# Patient Record
Sex: MALE | Race: WHITE | NOT HISPANIC OR LATINO | Employment: OTHER | URBAN - METROPOLITAN AREA
[De-identification: names, ages, dates, MRNs, and addresses within clinical notes are randomized per-mention and may not be internally consistent; named-entity substitution may affect disease eponyms.]

---

## 2020-12-18 ENCOUNTER — HOSPITAL ENCOUNTER (EMERGENCY)
Facility: HOSPITAL | Age: 75
Discharge: HOME | End: 2020-12-18
Attending: EMERGENCY MEDICINE
Payer: MEDICARE

## 2020-12-18 VITALS
RESPIRATION RATE: 19 BRPM | BODY MASS INDEX: 40.09 KG/M2 | WEIGHT: 280 LBS | HEART RATE: 75 BPM | TEMPERATURE: 98 F | HEIGHT: 70 IN | SYSTOLIC BLOOD PRESSURE: 105 MMHG | OXYGEN SATURATION: 96 % | DIASTOLIC BLOOD PRESSURE: 60 MMHG

## 2020-12-18 DIAGNOSIS — R55 SYNCOPE: ICD-10-CM

## 2020-12-18 DIAGNOSIS — F10.920 ALCOHOLIC INTOXICATION WITHOUT COMPLICATION: ICD-10-CM
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
## 2020-12-18 DIAGNOSIS — S09.90XA TRAUMATIC INJURY OF HEAD, INITIAL ENCOUNTER: ICD-10-CM

## 2020-12-18 LAB
ALBUMIN SERPL BCP-MCNC: 4.1 G/DL (ref 3.5–5.2)
ALP SERPL-CCNC: 40 U/L (ref 55–135)
ALT SERPL W/O P-5'-P-CCNC: 30 U/L (ref 10–44)
ANION GAP SERPL CALC-SCNC: 8 MMOL/L (ref 8–16)
AST SERPL-CCNC: 24 U/L (ref 10–40)
BASOPHILS # BLD AUTO: 0.02 K/UL (ref 0–0.2)
BASOPHILS NFR BLD: 0.4 % (ref 0–1.9)
BILIRUB SERPL-MCNC: 0.6 MG/DL (ref 0.1–1)
BUN SERPL-MCNC: 14 MG/DL (ref 8–23)
CALCIUM SERPL-MCNC: 8.8 MG/DL (ref 8.7–10.5)
CHLORIDE SERPL-SCNC: 103 MMOL/L (ref 95–110)
CO2 SERPL-SCNC: 27 MMOL/L (ref 23–29)
CREAT SERPL-MCNC: 0.9 MG/DL (ref 0.5–1.4)
DIFFERENTIAL METHOD: ABNORMAL
EOSINOPHIL # BLD AUTO: 0.1 K/UL (ref 0–0.5)
EOSINOPHIL NFR BLD: 0.9 % (ref 0–8)
ERYTHROCYTE [DISTWIDTH] IN BLOOD BY AUTOMATED COUNT: 12.8 % (ref 11.5–14.5)
EST. GFR  (AFRICAN AMERICAN): >60 ML/MIN/1.73 M^2
EST. GFR  (NON AFRICAN AMERICAN): >60 ML/MIN/1.73 M^2
ETHANOL SERPL-MCNC: 227 MG/DL
GLUCOSE SERPL-MCNC: 114 MG/DL (ref 70–110)
HCT VFR BLD AUTO: 47.7 % (ref 40–54)
HGB BLD-MCNC: 15.7 G/DL (ref 14–18)
IMM GRANULOCYTES # BLD AUTO: 0.01 K/UL (ref 0–0.04)
IMM GRANULOCYTES NFR BLD AUTO: 0.2 % (ref 0–0.5)
LYMPHOCYTES # BLD AUTO: 1.3 K/UL (ref 1–4.8)
LYMPHOCYTES NFR BLD: 23.4 % (ref 18–48)
MCH RBC QN AUTO: 32 PG (ref 27–31)
MCHC RBC AUTO-ENTMCNC: 32.9 G/DL (ref 32–36)
MCV RBC AUTO: 97 FL (ref 82–98)
MONOCYTES # BLD AUTO: 0.5 K/UL (ref 0.3–1)
MONOCYTES NFR BLD: 9.4 % (ref 4–15)
NEUTROPHILS # BLD AUTO: 3.7 K/UL (ref 1.8–7.7)
NEUTROPHILS NFR BLD: 65.7 % (ref 38–73)
NRBC BLD-RTO: 0 /100 WBC
PLATELET # BLD AUTO: 196 K/UL (ref 150–350)
PMV BLD AUTO: 8.5 FL (ref 9.2–12.9)
POTASSIUM SERPL-SCNC: 4.2 MMOL/L (ref 3.5–5.1)
PROT SERPL-MCNC: 8.5 G/DL (ref 6–8.4)
RBC # BLD AUTO: 4.9 M/UL (ref 4.6–6.2)
SODIUM SERPL-SCNC: 138 MMOL/L (ref 136–145)
TROPONIN I SERPL DL<=0.01 NG/ML-MCNC: <0.01 NG/ML (ref 0.02–0.5)
WBC # BLD AUTO: 5.64 K/UL (ref 3.9–12.7)

## 2020-12-18 PROCEDURE — 63600175 PHARM REV CODE 636 W HCPCS: Performed by: EMERGENCY MEDICINE

## 2020-12-18 PROCEDURE — 85025 COMPLETE CBC W/AUTO DIFF WBC: CPT

## 2020-12-18 PROCEDURE — 70450 CT HEAD/BRAIN W/O DYE: CPT | Mod: TC

## 2020-12-18 PROCEDURE — 84484 ASSAY OF TROPONIN QUANT: CPT

## 2020-12-18 PROCEDURE — 80053 COMPREHEN METABOLIC PANEL: CPT

## 2020-12-18 PROCEDURE — 70450 CT HEAD WITHOUT CONTRAST: ICD-10-PCS | Mod: 26,,, | Performed by: RADIOLOGY

## 2020-12-18 PROCEDURE — 80320 DRUG SCREEN QUANTALCOHOLS: CPT

## 2020-12-18 PROCEDURE — 90471 IMMUNIZATION ADMIN: CPT | Performed by: EMERGENCY MEDICINE

## 2020-12-18 PROCEDURE — 36415 COLL VENOUS BLD VENIPUNCTURE: CPT

## 2020-12-18 PROCEDURE — 90714 TD VACC NO PRESV 7 YRS+ IM: CPT | Performed by: EMERGENCY MEDICINE

## 2020-12-18 PROCEDURE — 96374 THER/PROPH/DIAG INJ IV PUSH: CPT

## 2020-12-18 PROCEDURE — 70450 CT HEAD/BRAIN W/O DYE: CPT | Mod: 26,,, | Performed by: RADIOLOGY

## 2020-12-18 PROCEDURE — 93005 ELECTROCARDIOGRAM TRACING: CPT

## 2020-12-18 PROCEDURE — 99285 EMERGENCY DEPT VISIT HI MDM: CPT | Mod: 25

## 2020-12-18 RX ORDER — ONDANSETRON 2 MG/ML
4 INJECTION INTRAMUSCULAR; INTRAVENOUS
Status: COMPLETED | OUTPATIENT
Start: 2020-12-18 | End: 2020-12-18

## 2020-12-18 RX ADMIN — ONDANSETRON 4 MG: 2 INJECTION INTRAMUSCULAR; INTRAVENOUS at 05:12

## 2020-12-18 RX ADMIN — TETANUS AND DIPHTHERIA TOXOIDS ADSORBED 0.5 ML: 2; 2 INJECTION INTRAMUSCULAR at 05:12

## 2020-12-18 NOTE — ED PROVIDER NOTES
Encounter Date: 12/18/2020       History     Chief Complaint   Patient presents with    Fall     Possible syncope; ETOH     75-year-old male [place] a local bar via ambulance after he had a syncopal episode.  Patient states he stood up to leave for home, and the next thing he remembers he was on the ground with people standing around him.  He states he felt too weak to get up.  He denies any pain.  No headache, no back pain, no neck pain.  He is complaining of some nausea.  Denies chest pain or palpitations.  Denies any known history of syncope.  Admits to drinking alcohol prior to the event.        Review of patient's allergies indicates:  No Known Allergies  History reviewed. No pertinent past medical history.  History reviewed. No pertinent surgical history.  History reviewed. No pertinent family history.  Social History     Tobacco Use    Smoking status: Former Smoker    Smokeless tobacco: Former User   Substance Use Topics    Alcohol use: Yes    Drug use: Never     Review of Systems   Constitutional: Negative for chills and fever.   HENT: Negative for congestion, rhinorrhea and sore throat.    Eyes: Negative for photophobia and visual disturbance.   Respiratory: Negative for cough and shortness of breath.    Cardiovascular: Negative for chest pain and palpitations.   Gastrointestinal: Positive for nausea. Negative for abdominal pain and vomiting.   Endocrine: Negative for polyphagia and polyuria.   Genitourinary: Negative for difficulty urinating, dysuria, flank pain and hematuria.   Musculoskeletal: Negative for back pain, myalgias, neck pain and neck stiffness.   Skin: Positive for wound. Negative for pallor and rash.   Neurological: Positive for syncope. Negative for dizziness, seizures, facial asymmetry, speech difficulty, weakness, light-headedness, numbness and headaches.   Psychiatric/Behavioral: Negative for confusion and decreased concentration. The patient is not nervous/anxious.         Physical Exam     Initial Vitals   BP Pulse Resp Temp SpO2   12/18/20 1718 12/18/20 1718 12/18/20 1718 12/18/20 1715 12/18/20 1718   137/74 70 20 97.5 °F (36.4 °C) 96 %      MAP       --                Physical Exam    Nursing note and vitals reviewed.  Constitutional: He appears well-developed and well-nourished. No distress.   HENT:   Head: Normocephalic.   Right Ear: External ear normal.   Left Ear: External ear normal.   Nose: Nose normal.   Mouth/Throat: Oropharynx is clear and moist. No oropharyngeal exudate.   Mild abrasion on the left side of the nose, and in the left temporal area.  No obvious lacerations.  Bleeding well controlled.   Eyes: Conjunctivae and EOM are normal. Pupils are equal, round, and reactive to light. No scleral icterus.   Neck: Normal range of motion. Neck supple. No tracheal deviation present. No JVD present.   Cardiovascular: Normal rate, regular rhythm, normal heart sounds and intact distal pulses.   No murmur heard.  Pulmonary/Chest: Breath sounds normal. No stridor. No respiratory distress. He has no wheezes.   Abdominal: Soft. Bowel sounds are normal. He exhibits no distension. There is no abdominal tenderness.   Musculoskeletal: Normal range of motion. No tenderness or edema.   Neurological: He is alert and oriented to person, place, and time. He has normal strength and normal reflexes. He displays normal reflexes. No cranial nerve deficit. GCS score is 15. GCS eye subscore is 4. GCS verbal subscore is 5. GCS motor subscore is 6.   Skin: Skin is warm. Capillary refill takes less than 2 seconds. No rash noted. No erythema.   Psychiatric: He has a normal mood and affect. His behavior is normal.         ED Course   Procedures  Labs Reviewed   CBC W/ AUTO DIFFERENTIAL - Abnormal; Notable for the following components:       Result Value    MCH 32.0 (*)     MPV 8.5 (*)     All other components within normal limits   COMPREHENSIVE METABOLIC PANEL - Abnormal; Notable for the  following components:    Glucose 114 (*)     Total Protein 8.5 (*)     Alkaline Phosphatase 40 (*)     All other components within normal limits   ALCOHOL,MEDICAL (ETHANOL) - Abnormal; Notable for the following components:    Alcohol, Serum 227 (*)     All other components within normal limits   TROPONIN I - Abnormal; Notable for the following components:    Troponin I <0.01 (*)     All other components within normal limits     EKG Readings: (Independently Interpreted)   Initial Reading: No STEMI. Rhythm: Normal Sinus Rhythm. Heart Rate: 71. Ectopy: No Ectopy. Conduction: Normal. ST Segments: Normal ST Segments. T Waves: Normal.   EKG, personally reviewed by me, shows normal sinus rhythm with junctional ST depression, likely normal.  71 beats per minute, OR interval 166, .  No obvious ST elevations or depressions.       Imaging Results          CT Head Without Contrast (In process)                  Medical Decision Making:   Differential Diagnosis:   Alcohol intoxication, hypotension, hypoglycemia, stroke, skull fracture, cardiac arrhythmia, etc.  ED Management:  Patient's labs and CT are pending.  He states he is comfortable.  He was given Zofran for nausea.  At shift change, his care will be transferred to Dr. AGUILAR who will make final disposition.                             Clinical Impression:     ICD-10-CM ICD-9-CM   1. Fall, initial encounter  W19.XXXA E888.9   2. Syncope  R55 780.2   3. Alcoholic intoxication without complication  F10.920 305.00   4. Traumatic injury of head, initial encounter  S09.90XA 959.01                          ED Disposition Condition    Discharge Stable        ED Prescriptions     None        Follow-up Information    None                                      Antione Aguilar MD  12/19/20 0543       Antione Aguilar MD  12/19/20 0609       Antione Aguilar MD  12/19/20 0610

## 2020-12-19 NOTE — ED NOTES
"Pt report received at bedside from QASIM Meza, assumed care of pt. Pt lying in bed AAOX3, states, "I am just ready to go home.", made pt aware of awaiting results, all safety measures noted and maintained, will continue to monitor pt. Dr. Aguilar at bedside at this time.   "

## 2020-12-19 NOTE — ED NOTES
PT arrived via EMS after fall. Pt having some drinks with buddies and had a fall while walking to his car; unclear whether there was a syncopal episode or loss of balance.

## 2024-03-06 ENCOUNTER — HOSPITAL ENCOUNTER (OUTPATIENT)
Facility: HOSPITAL | Age: 79
Discharge: HOME | End: 2024-03-06
Attending: EMERGENCY MEDICINE | Admitting: INTERNAL MEDICINE
Payer: MEDICARE

## 2024-03-06 VITALS
DIASTOLIC BLOOD PRESSURE: 96 MMHG | HEIGHT: 70 IN | HEART RATE: 125 BPM | WEIGHT: 287.06 LBS | RESPIRATION RATE: 23 BRPM | OXYGEN SATURATION: 92 % | BODY MASS INDEX: 41.1 KG/M2 | TEMPERATURE: 98 F | SYSTOLIC BLOOD PRESSURE: 138 MMHG

## 2024-03-06 DIAGNOSIS — R06.02 SHORTNESS OF BREATH: ICD-10-CM

## 2024-03-06 DIAGNOSIS — R00.0 TACHYCARDIA: ICD-10-CM

## 2024-03-06 DIAGNOSIS — I50.33 ACUTE ON CHRONIC DIASTOLIC (CONGESTIVE) HEART FAILURE: Primary | ICD-10-CM

## 2024-03-06 LAB
ALBUMIN SERPL BCP-MCNC: 3.3 G/DL (ref 3.5–5.2)
ALP SERPL-CCNC: 66 U/L (ref 55–135)
ALT SERPL W/O P-5'-P-CCNC: 18 U/L (ref 10–44)
ANION GAP SERPL CALC-SCNC: 8 MMOL/L (ref 8–16)
AST SERPL-CCNC: 23 U/L (ref 10–40)
BASOPHILS # BLD AUTO: 0.04 K/UL (ref 0–0.2)
BASOPHILS NFR BLD: 0.9 % (ref 0–1.9)
BILIRUB SERPL-MCNC: 0.9 MG/DL (ref 0.1–1)
BNP SERPL-MCNC: 369 PG/ML (ref 0–99)
BUN SERPL-MCNC: 13 MG/DL (ref 8–23)
CALCIUM SERPL-MCNC: 9.2 MG/DL (ref 8.7–10.5)
CHLORIDE SERPL-SCNC: 104 MMOL/L (ref 95–110)
CO2 SERPL-SCNC: 25 MMOL/L (ref 23–29)
CREAT SERPL-MCNC: 0.9 MG/DL (ref 0.5–1.4)
DIFFERENTIAL METHOD BLD: ABNORMAL
EOSINOPHIL # BLD AUTO: 0 K/UL (ref 0–0.5)
EOSINOPHIL NFR BLD: 0.9 % (ref 0–8)
ERYTHROCYTE [DISTWIDTH] IN BLOOD BY AUTOMATED COUNT: 13.1 % (ref 11.5–14.5)
EST. GFR  (NO RACE VARIABLE): >60 ML/MIN/1.73 M^2
GLUCOSE SERPL-MCNC: 101 MG/DL (ref 70–110)
HCT VFR BLD AUTO: 46 % (ref 40–54)
HCV AB SERPL QL IA: NEGATIVE
HEP C VIRUS HOLD SPECIMEN: NORMAL
HGB BLD-MCNC: 15.2 G/DL (ref 14–18)
HIV 1+2 AB+HIV1 P24 AG SERPL QL IA: NEGATIVE
IMM GRANULOCYTES # BLD AUTO: 0.01 K/UL (ref 0–0.04)
IMM GRANULOCYTES NFR BLD AUTO: 0.2 % (ref 0–0.5)
INFLUENZA A, MOLECULAR: NEGATIVE
INFLUENZA B, MOLECULAR: NEGATIVE
LYMPHOCYTES # BLD AUTO: 1 K/UL (ref 1–4.8)
LYMPHOCYTES NFR BLD: 22.6 % (ref 18–48)
MCH RBC QN AUTO: 32.3 PG (ref 27–31)
MCHC RBC AUTO-ENTMCNC: 33 G/DL (ref 32–36)
MCV RBC AUTO: 98 FL (ref 82–98)
MONOCYTES # BLD AUTO: 0.5 K/UL (ref 0.3–1)
MONOCYTES NFR BLD: 11.1 % (ref 4–15)
NEUTROPHILS # BLD AUTO: 2.8 K/UL (ref 1.8–7.7)
NEUTROPHILS NFR BLD: 64.3 % (ref 38–73)
NRBC BLD-RTO: 0 /100 WBC
PLATELET # BLD AUTO: 237 K/UL (ref 150–450)
PMV BLD AUTO: 9.9 FL (ref 9.2–12.9)
POTASSIUM SERPL-SCNC: 4.5 MMOL/L (ref 3.5–5.1)
PROT SERPL-MCNC: 8 G/DL (ref 6–8.4)
RBC # BLD AUTO: 4.71 M/UL (ref 4.6–6.2)
SARS-COV-2 RDRP RESP QL NAA+PROBE: NEGATIVE
SODIUM SERPL-SCNC: 137 MMOL/L (ref 136–145)
SPECIMEN SOURCE: NORMAL
TROPONIN I SERPL DL<=0.01 NG/ML-MCNC: 0.09 NG/ML (ref 0–0.03)
WBC # BLD AUTO: 4.42 K/UL (ref 3.9–12.7)

## 2024-03-06 PROCEDURE — 80053 COMPREHEN METABOLIC PANEL: CPT | Performed by: NURSE PRACTITIONER

## 2024-03-06 PROCEDURE — 87502 INFLUENZA DNA AMP PROBE: CPT | Performed by: NURSE PRACTITIONER

## 2024-03-06 PROCEDURE — U0002 COVID-19 LAB TEST NON-CDC: HCPCS | Performed by: NURSE PRACTITIONER

## 2024-03-06 PROCEDURE — 99285 EMERGENCY DEPT VISIT HI MDM: CPT | Mod: 25

## 2024-03-06 PROCEDURE — 96374 THER/PROPH/DIAG INJ IV PUSH: CPT

## 2024-03-06 PROCEDURE — 87389 HIV-1 AG W/HIV-1&-2 AB AG IA: CPT | Performed by: EMERGENCY MEDICINE

## 2024-03-06 PROCEDURE — 83880 ASSAY OF NATRIURETIC PEPTIDE: CPT | Performed by: NURSE PRACTITIONER

## 2024-03-06 PROCEDURE — 25500020 PHARM REV CODE 255: Performed by: EMERGENCY MEDICINE

## 2024-03-06 PROCEDURE — 86803 HEPATITIS C AB TEST: CPT | Performed by: EMERGENCY MEDICINE

## 2024-03-06 PROCEDURE — 84484 ASSAY OF TROPONIN QUANT: CPT | Performed by: NURSE PRACTITIONER

## 2024-03-06 PROCEDURE — 63600175 PHARM REV CODE 636 W HCPCS: Performed by: EMERGENCY MEDICINE

## 2024-03-06 PROCEDURE — 85025 COMPLETE CBC W/AUTO DIFF WBC: CPT | Performed by: NURSE PRACTITIONER

## 2024-03-06 PROCEDURE — G0378 HOSPITAL OBSERVATION PER HR: HCPCS

## 2024-03-06 RX ORDER — FUROSEMIDE 40 MG/1
40 TABLET ORAL DAILY
Qty: 30 TABLET | Refills: 0 | Status: SHIPPED | OUTPATIENT
Start: 2024-03-06 | End: 2024-04-01

## 2024-03-06 RX ORDER — FUROSEMIDE 10 MG/ML
60 INJECTION INTRAMUSCULAR; INTRAVENOUS
Status: COMPLETED | OUTPATIENT
Start: 2024-03-06 | End: 2024-03-06

## 2024-03-06 RX ADMIN — IOHEXOL 100 ML: 350 INJECTION, SOLUTION INTRAVENOUS at 12:03

## 2024-03-06 RX ADMIN — FUROSEMIDE 60 MG: 10 INJECTION, SOLUTION INTRAMUSCULAR; INTRAVENOUS at 11:03

## 2024-03-06 NOTE — PHARMACY MED REC
"Admission Medication History     The home medication history was taken by Nita Cabrera.    You may go to "Admission" then "Reconcile Home Medications" tabs to review and/or act upon these items.     The home medication list has been updated by the Pharmacy department.   Please read ALL comments highlighted in yellow.   Please address this information as you see fit.    Feel free to contact us if you have any questions or require assistance.        Medications listed below were obtained from: Patient/family and Analytic software- gracia Shen at bedside Justina Clark  (Not in a hospital admission)      LAST MED REC COMPLETED:         Nita Cabrera  [id number]      No current outpatient medications on file prior to encounter.         e                  .          "

## 2024-03-06 NOTE — FIRST PROVIDER EVALUATION
"Medical screening examination initiated.  I have conducted a focused provider triage encounter, findings are as follows:    Brief history of present illness:  Patient presents the ER for shortness of breath.  Patient is tachycardic    Vitals:    03/06/24 0924   BP: (!) 168/110   BP Location: Right arm   Patient Position: Sitting   Pulse: (!) 127   Resp: (!) 24   Temp: 97.5 °F (36.4 °C)   TempSrc: Oral   SpO2: 96%   Weight: 130.2 kg (287 lb 0.6 oz)   Height: 5' 10" (1.778 m)       Pertinent physical exam:  Tachycardic    Brief workup plan:  Cardiac workup    Preliminary workup initiated; this workup will be continued and followed by the physician or advanced practice provider that is assigned to the patient when roomed.  "

## 2024-03-06 NOTE — ED PROVIDER NOTES
SCRIBE #1 NOTE: I, Gerardo Cruz, am scribing for, and in the presence of, Didier Tinoco MD. I have scribed the entire note.      History      Chief Complaint   Patient presents with    Shortness of Breath     Patient presents to ED with c/o SOB that started last night, has history of lymphedema, BLE edematous.       Review of patient's allergies indicates:   Allergen Reactions    Vancomycin Hives     Patient given vancomycin on 7/7/2020 developed hives.  Vancomycin added to allergy list.        HPI   HPI    3/6/2024, 9:42 AM   History obtained from the patient      History of Present Illness: Chucho Clark is a 78 y.o. male patient who presents to the Emergency Department for SOB, onset last night. Symptoms are constant and moderate in severity. Sxs are worse when laying flat. Associated sxs include chest pain and BLE swelling. Patient denies any fever, chills, n/v/d, weakness, numbness, dizziness, headache, and all other sxs at this time. Pt denies any known PMHx of CHF. No further complaints or concerns at this time.     Arrival mode: Personal vehicle    PCP: No, Primary Doctor       Past Medical History:  No past medical history on file.    Past Surgical History:  No past surgical history on file.      Family History:  No family history on file.    Social History:  Social History     Tobacco Use    Smoking status: Former    Smokeless tobacco: Former   Substance and Sexual Activity    Alcohol use: Yes    Drug use: Never    Sexual activity: Not on file       ROS   Review of Systems   Constitutional:  Negative for chills and fever.   HENT:  Negative for sore throat.    Respiratory:  Positive for shortness of breath.    Cardiovascular:  Positive for chest pain and leg swelling (BLE).   Gastrointestinal:  Negative for diarrhea, nausea and vomiting.   Genitourinary:  Negative for dysuria.   Musculoskeletal:  Negative for back pain.   Skin:  Negative for rash.   Neurological:  Negative for dizziness, weakness,  "numbness and headaches.   Hematological:  Does not bruise/bleed easily.   All other systems reviewed and are negative.    Physical Exam      Initial Vitals [03/06/24 0924]   BP Pulse Resp Temp SpO2   (!) 168/110 (!) 127 (!) 24 97.5 °F (36.4 °C) 96 %      MAP       --          Physical Exam  Nursing Notes and Vital Signs Reviewed.  Constitutional: Patient is in no acute distress. Well-developed and well-nourished.  Head: Atraumatic. Normocephalic.  Eyes: PERRL. EOM intact. Conjunctivae are not pale. No scleral icterus.  ENT: Mucous membranes are moist. Oropharynx is clear and symmetric.    Neck: Supple. Full ROM.   Cardiovascular: Tachycardic. Regular rhythm. No murmurs, rubs, or gallops. Distal pulses are 2+ and symmetric.  Pulmonary/Chest: No respiratory distress. Clear to auscultation bilaterally. No wheezing or rales.  Abdominal: Soft and non-distended.  There is no tenderness.  No rebound, guarding, or rigidity.   Musculoskeletal: Moves all extremities. No obvious deformities. 3+ BLE edema.  Skin: Warm and dry.  Neurological:  Alert, awake, and appropriate.  Normal speech.  No acute focal neurological deficits are appreciated.  Psychiatric: Normal affect. Good eye contact. Appropriate in content.    ED Course    Procedures  ED Vital Signs:  Vitals:    03/06/24 0924 03/06/24 0945 03/06/24 1000 03/06/24 1030   BP: (!) 168/110 (!) 180/113 (!) 163/98 (!) 153/103   Pulse: (!) 127 (!) 127 (!) 127 (!) 125   Resp: (!) 24 (!) 29 (!) 27 (!) 30   Temp: 97.5 °F (36.4 °C)      TempSrc: Oral      SpO2: 96% (!) 93% (!) 94% (!) 94%   Weight: 130.2 kg (287 lb 0.6 oz)      Height: 5' 10" (1.778 m)       03/06/24 1100 03/06/24 1115 03/06/24 1200   BP: (!) 161/106 (!) 152/99 (!) 153/99   Pulse: (!) 127 (!) 128 (!) 128   Resp: (!) 24 (!) 26 (!) 24   Temp:      TempSrc:      SpO2: (!) 93% (!) 92% (!) 94%   Weight:      Height:          Abnormal Lab Results:  Labs Reviewed   CBC W/ AUTO DIFFERENTIAL - Abnormal; Notable for the " following components:       Result Value    MCH 32.3 (*)     All other components within normal limits    Narrative:     Release to patient->Immediate   COMPREHENSIVE METABOLIC PANEL - Abnormal; Notable for the following components:    Albumin 3.3 (*)     All other components within normal limits    Narrative:     Release to patient->Immediate   TROPONIN I - Abnormal; Notable for the following components:    Troponin I 0.093 (*)     All other components within normal limits    Narrative:     Release to patient->Immediate   B-TYPE NATRIURETIC PEPTIDE - Abnormal; Notable for the following components:     (*)     All other components within normal limits    Narrative:     Release to patient->Immediate   INFLUENZA A & B BY MOLECULAR   HIV 1 / 2 ANTIBODY    Narrative:     Release to patient->Immediate   HEPATITIS C ANTIBODY    Narrative:     Release to patient->Immediate   HEP C VIRUS HOLD SPECIMEN    Narrative:     Release to patient->Immediate   SARS-COV-2 RNA AMPLIFICATION, QUAL        All Lab Results:  Results for orders placed or performed during the hospital encounter of 03/06/24   Influenza A & B by Molecular    Specimen: Nasopharyngeal Swab   Result Value Ref Range    Influenza A, Molecular Negative Negative    Influenza B, Molecular Negative Negative    Flu A & B Source Nasal swab    HIV 1/2 Ag/Ab (4th Gen)   Result Value Ref Range    HIV 1/2 Ag/Ab Negative Negative   Hepatitis C Antibody   Result Value Ref Range    Hepatitis C Ab Negative Negative   HCV Virus Hold Specimen   Result Value Ref Range    HEP C Virus Hold Specimen Hold for HCV sendout    CBC Auto Differential   Result Value Ref Range    WBC 4.42 3.90 - 12.70 K/uL    RBC 4.71 4.60 - 6.20 M/uL    Hemoglobin 15.2 14.0 - 18.0 g/dL    Hematocrit 46.0 40.0 - 54.0 %    MCV 98 82 - 98 fL    MCH 32.3 (H) 27.0 - 31.0 pg    MCHC 33.0 32.0 - 36.0 g/dL    RDW 13.1 11.5 - 14.5 %    Platelets 237 150 - 450 K/uL    MPV 9.9 9.2 - 12.9 fL    Immature  Granulocytes 0.2 0.0 - 0.5 %    Gran # (ANC) 2.8 1.8 - 7.7 K/uL    Immature Grans (Abs) 0.01 0.00 - 0.04 K/uL    Lymph # 1.0 1.0 - 4.8 K/uL    Mono # 0.5 0.3 - 1.0 K/uL    Eos # 0.0 0.0 - 0.5 K/uL    Baso # 0.04 0.00 - 0.20 K/uL    nRBC 0 0 /100 WBC    Gran % 64.3 38.0 - 73.0 %    Lymph % 22.6 18.0 - 48.0 %    Mono % 11.1 4.0 - 15.0 %    Eosinophil % 0.9 0.0 - 8.0 %    Basophil % 0.9 0.0 - 1.9 %    Differential Method Automated    Comprehensive Metabolic Panel   Result Value Ref Range    Sodium 137 136 - 145 mmol/L    Potassium 4.5 3.5 - 5.1 mmol/L    Chloride 104 95 - 110 mmol/L    CO2 25 23 - 29 mmol/L    Glucose 101 70 - 110 mg/dL    BUN 13 8 - 23 mg/dL    Creatinine 0.9 0.5 - 1.4 mg/dL    Calcium 9.2 8.7 - 10.5 mg/dL    Total Protein 8.0 6.0 - 8.4 g/dL    Albumin 3.3 (L) 3.5 - 5.2 g/dL    Total Bilirubin 0.9 0.1 - 1.0 mg/dL    Alkaline Phosphatase 66 55 - 135 U/L    AST 23 10 - 40 U/L    ALT 18 10 - 44 U/L    eGFR >60 >60 mL/min/1.73 m^2    Anion Gap 8 8 - 16 mmol/L   Troponin I   Result Value Ref Range    Troponin I 0.093 (H) 0.000 - 0.026 ng/mL   Brain natriuretic peptide   Result Value Ref Range     (H) 0 - 99 pg/mL   COVID-19 Rapid Screening   Result Value Ref Range    SARS-CoV-2 RNA, Amplification, Qual Negative Negative     Imaging Results:  Imaging Results              CTA Chest Non-Coronary (PE Studies) (Final result)  Result time 03/06/24 13:02:25      Final result by Earl Spain MD (03/06/24 13:02:25)                   Impression:      1. No large or central pulmonary embolism.  Motion artifact limits evaluation of lower lung segmental and subsegmental arteries.  2. Borderline cardiomegaly and small bilateral pleural effusions which may reflect CHF/volume overload.      Electronically signed by: Earl Spain  Date:    03/06/2024  Time:    13:02               Narrative:    EXAMINATION:  CTA CHEST NON CORONARY (PE STUDIES)    CLINICAL HISTORY:  PE suspected, intermediate prob, neg D-dimer;,  chest pain    COMPARISON:  None available.    TECHNIQUE:  Axial CT images were obtained of the chest.  Iterative reconstruction technique was used. The CT exam was performed using one or more of the following dose reduction techniques- Automated exposure control, adjustment of the mA and/or kV according to patient size, and/or use of iterative reconstructed technique.  Low dose axial images were obtained, sagittal and coronal reformations were created.  100 mL Omnipaque 350 IV contrast was administered.  Contrast timing was optimized to evaluate the vascular structures.  MIP images were performed.    FINDINGS:  Coronary artery calcification: Present.    Pulmonary Arteries: Slightly limited exam secondary to motion artifact in the lung bases.  No large or central pulmonary embolism.    Aorta: Mild atherosclerosis.  No aortic aneurysm.    Lungs/pleura: Small bilateral pleural effusions and adjacent atelectasis.    Heart: Borderline in size.  No pericardial effusion.    Bones/joints: No acute finding.    Other: No mediastinal or axillary lymphadenopathy.  No acute finding in the upper abdomen.                                       X-Ray Chest 1 View (Final result)  Result time 03/06/24 10:03:29      Final result by Oneil Burnham MD (03/06/24 10:03:29)                   Impression:      CHF      Electronically signed by: Oneil Brunham MD  Date:    03/06/2024  Time:    10:03               Narrative:    EXAMINATION:  XR CHEST 1 VIEW    CLINICAL HISTORY:  shortness of breath;    TECHNIQUE:  Single frontal view of the chest was performed.    COMPARISON:  None    FINDINGS:  Mild cardiomegaly.    Mild pulmonary vascular congestion.  Shallow inspiration with slight elevation left hemidiaphragm.    Suspect very small right pleural effusion with blunting of the right costophrenic sulcus.  No advanced arthritic changes.                                     The EKG was ordered, reviewed, and independently interpreted by the ED  provider.  Interpretation time: 09:25  Rate: 126 BPM  Rhythm: Sinus tachycardia with occasional PVCs  Interpretation: Nonspecific intraventricular block. No STEMI.           The Emergency Provider reviewed the vital signs and test results, which are outlined above.    ED Discussion     1:28 PM: Discussed case with Dr. Bruno ([facility]). Dr. Bruno agrees with current care and management of pt and accepts admission.   Admitting Service: [facility]  Admitting Physician: Dr. Bruno  Admit to: Obs    1:29 PM: Re-evaluated pt. I have discussed test results, shared treatment plan, and the need for admission with patient and family at bedside. Pt and family express understanding at this time and agree with all information. All questions answered. Pt and family have no further questions or concerns at this time. Pt is ready for admit.         ED Medication(s):  Medications   furosemide injection 60 mg (60 mg Intravenous Given 3/6/24 1106)   iohexoL (OMNIPAQUE 350) injection 100 mL (100 mLs Intravenous Given 3/6/24 1226)       New Prescriptions    No medications on file         Medical Decision Making    Medical Decision Making  Sob, and orthopnea with swollen legs  DDx: CHF, sob, orthopnea    Amount and/or Complexity of Data Reviewed  Labs: ordered. Decision-making details documented in ED Course.  Radiology: ordered. Decision-making details documented in ED Course.  ECG/medicine tests: ordered and independent interpretation performed. Decision-making details documented in ED Course.    Risk  Decision regarding hospitalization.                Scribe Attestation:   Scribe #1: I performed the above scribed service and the documentation accurately describes the services I performed. I attest to the accuracy of the note.    Attending:   Physician Attestation Statement for Scribe #1: I, Didier Tinoco MD, personally performed the services described in this documentation, as scribed by Gerardo Cruz, in my  presence, and it is both accurate and complete.          Clinical Impression       ICD-10-CM ICD-9-CM   1. Acute on chronic diastolic (congestive) heart failure  I50.33 428.33     428.0   2. Shortness of breath  R06.02 786.05   3. Tachycardia  R00.0 785.0       Disposition:   Disposition: Placed in Observation  Condition: Didier Dickey MD  03/06/24 6450

## 2024-03-06 NOTE — Clinical Note
Diagnosis: Acute on chronic diastolic (congestive) heart failure [[id number]]   Future Attending Provider: ALLI BONNER [[id number]]

## 2024-03-13 ENCOUNTER — OFFICE VISIT (OUTPATIENT)
Dept: INTERNAL MEDICINE | Facility: CLINIC | Age: 79
End: 2024-03-13
Payer: MEDICARE

## 2024-03-13 VITALS
DIASTOLIC BLOOD PRESSURE: 82 MMHG | SYSTOLIC BLOOD PRESSURE: 124 MMHG | HEART RATE: 88 BPM | TEMPERATURE: 97 F | OXYGEN SATURATION: 96 % | HEIGHT: 70 IN | WEIGHT: 278.25 LBS | RESPIRATION RATE: 16 BRPM | BODY MASS INDEX: 39.83 KG/M2

## 2024-03-13 DIAGNOSIS — Z00.00 ROUTINE HEALTH MAINTENANCE: ICD-10-CM

## 2024-03-13 DIAGNOSIS — I25.10 CORONARY ARTERY DISEASE INVOLVING NATIVE CORONARY ARTERY OF NATIVE HEART WITHOUT ANGINA PECTORIS: ICD-10-CM

## 2024-03-13 DIAGNOSIS — Z09 HOSPITAL DISCHARGE FOLLOW-UP: ICD-10-CM

## 2024-03-13 DIAGNOSIS — I50.9 ACUTE ON CHRONIC HEART FAILURE, UNSPECIFIED HEART FAILURE TYPE: Primary | ICD-10-CM

## 2024-03-13 DIAGNOSIS — R06.01 ORTHOPNEA: ICD-10-CM

## 2024-03-13 DIAGNOSIS — Z12.5 SCREENING FOR PROSTATE CANCER: ICD-10-CM

## 2024-03-13 DIAGNOSIS — I70.0 ATHEROSCLEROSIS OF AORTA: ICD-10-CM

## 2024-03-13 PROBLEM — I10 ESSENTIAL HYPERTENSION: Status: ACTIVE | Noted: 2020-07-15

## 2024-03-13 PROBLEM — I87.2 VENOUS INSUFFICIENCY: Status: ACTIVE | Noted: 2020-07-17

## 2024-03-13 PROBLEM — I89.0 LYMPHEDEMA: Status: ACTIVE | Noted: 2020-07-17

## 2024-03-13 PROBLEM — I82.5Z2 CHRONIC DEEP VEIN THROMBOSIS (DVT) OF DISTAL VEIN OF LEFT LOWER EXTREMITY: Status: ACTIVE | Noted: 2020-07-07

## 2024-03-13 PROBLEM — I87.2 VENOUS STASIS DERMATITIS OF BOTH LOWER EXTREMITIES: Status: ACTIVE | Noted: 2020-07-15

## 2024-03-13 PROBLEM — I51.89 GRADE II DIASTOLIC DYSFUNCTION: Status: ACTIVE | Noted: 2020-07-15

## 2024-03-13 PROCEDURE — 99214 OFFICE O/P EST MOD 30 MIN: CPT | Mod: PBBFAC | Performed by: PHYSICIAN ASSISTANT

## 2024-03-13 PROCEDURE — 99204 OFFICE O/P NEW MOD 45 MIN: CPT | Mod: S$PBB,,, | Performed by: PHYSICIAN ASSISTANT

## 2024-03-13 PROCEDURE — 99999 PR PBB SHADOW E&M-EST. PATIENT-LVL IV: CPT | Mod: PBBFAC,,, | Performed by: PHYSICIAN ASSISTANT

## 2024-03-13 RX ORDER — ROSUVASTATIN CALCIUM 10 MG/1
10 TABLET, COATED ORAL DAILY
Qty: 90 TABLET | Refills: 3 | Status: SHIPPED | OUTPATIENT
Start: 2024-03-13 | End: 2025-03-13

## 2024-03-13 NOTE — PROGRESS NOTES
Subjective:      Patient ID: Chucho Clark is a 78 y.o. male.    Chief Complaint: Follow-up and Cough    HPI  Here today for a hospital follow up for acute on chronic CHF. Rx lasix given in the ER. He has been taking 40mg daily. 85% improved. Still having shortness of breath with exertion and laying flat.   No pcp and no cardiologist. Hasn't been to doctor in years. Doesn't take any medications.   Not on cholesterol medications.   Does have a history of DVT and was on anticoag previously.   Denies any chest pain or heart palpitations.     Patient Active Problem List   Diagnosis    Atherosclerosis of aorta    Coronary artery disease involving native coronary artery of native heart without angina pectoris    Chronic deep vein thrombosis (DVT) of distal vein of left lower extremity    Essential hypertension    Lymphedema    Venous stasis dermatitis of both lower extremities    Venous insufficiency    Grade II diastolic dysfunction         Current Outpatient Medications:     furosemide (LASIX) 40 MG tablet, Take 1 tablet (40 mg total) by mouth once daily., Disp: 30 tablet, Rfl: 0    rosuvastatin (CRESTOR) 10 MG tablet, Take 1 tablet (10 mg total) by mouth once daily., Disp: 90 tablet, Rfl: 3    Review of Systems   Constitutional:  Positive for fatigue. Negative for activity change, appetite change, chills, diaphoresis, fever and unexpected weight change.   HENT: Negative.  Negative for congestion, hearing loss, postnasal drip, rhinorrhea, sore throat, trouble swallowing and voice change.    Eyes: Negative.  Negative for visual disturbance.   Respiratory:  Positive for chest tightness and shortness of breath. Negative for cough and choking.    Cardiovascular:  Negative for chest pain, palpitations and leg swelling.   Gastrointestinal:  Negative for abdominal distention, abdominal pain, blood in stool, constipation, diarrhea, nausea and vomiting.   Endocrine: Negative for cold intolerance, heat intolerance, polydipsia  "and polyuria.   Genitourinary: Negative.  Negative for difficulty urinating and frequency.   Musculoskeletal:  Negative for arthralgias, back pain, gait problem, joint swelling and myalgias.   Skin:  Negative for color change, pallor, rash and wound.   Neurological:  Negative for dizziness, tremors, weakness, light-headedness, numbness and headaches.   Hematological:  Negative for adenopathy.   Psychiatric/Behavioral:  Negative for behavioral problems, confusion, decreased concentration, dysphoric mood, self-injury, sleep disturbance and suicidal ideas. The patient is not nervous/anxious.      Objective:   /82 (BP Location: Left arm, Patient Position: Sitting, BP Method: Large (Manual))   Pulse 88   Temp 97.1 °F (36.2 °C) (Tympanic)   Resp 16   Ht 5' 10" (1.778 m)   Wt 126.2 kg (278 lb 3.5 oz)   SpO2 96%   BMI 39.92 kg/m²     Physical Exam  Vitals and nursing note reviewed.   Constitutional:       General: He is not in acute distress.     Appearance: Normal appearance. He is well-developed. He is not ill-appearing, toxic-appearing or diaphoretic.   HENT:      Head: Normocephalic and atraumatic.   Cardiovascular:      Rate and Rhythm: Normal rate and regular rhythm.      Heart sounds: Normal heart sounds. No murmur heard.     No friction rub. No gallop.   Pulmonary:      Effort: Pulmonary effort is normal. No respiratory distress.      Breath sounds: Normal breath sounds. No wheezing or rales.   Skin:     General: Skin is warm.      Findings: No rash.   Neurological:      Mental Status: He is alert and oriented to person, place, and time.   Psychiatric:         Mood and Affect: Mood normal.         Behavior: Behavior normal.         Thought Content: Thought content normal.         Judgment: Judgment normal.       Admission on 03/06/2024, Discharged on 03/06/2024   Component Date Value Ref Range Status    HIV 1/2 Ag/Ab 03/06/2024 Negative  Negative Final    Hepatitis C Ab 03/06/2024 Negative  Negative " Final    HEP C Virus Hold Specimen 03/06/2024 Hold for HCV sendout   Final    WBC 03/06/2024 4.42  3.90 - 12.70 K/uL Final    RBC 03/06/2024 4.71  4.60 - 6.20 M/uL Final    Hemoglobin 03/06/2024 15.2  14.0 - 18.0 g/dL Final    Hematocrit 03/06/2024 46.0  40.0 - 54.0 % Final    MCV 03/06/2024 98  82 - 98 fL Final    MCH 03/06/2024 32.3 (H)  27.0 - 31.0 pg Final    MCHC 03/06/2024 33.0  32.0 - 36.0 g/dL Final    RDW 03/06/2024 13.1  11.5 - 14.5 % Final    Platelets 03/06/2024 237  150 - 450 K/uL Final    MPV 03/06/2024 9.9  9.2 - 12.9 fL Final    Immature Granulocytes 03/06/2024 0.2  0.0 - 0.5 % Final    Gran # (ANC) 03/06/2024 2.8  1.8 - 7.7 K/uL Final    Immature Grans (Abs) 03/06/2024 0.01  0.00 - 0.04 K/uL Final    Comment: Mild elevation in immature granulocytes is non specific and   can be seen in a variety of conditions including stress response,   acute inflammation, trauma and pregnancy. Correlation with other   laboratory and clinical findings is essential.      Lymph # 03/06/2024 1.0  1.0 - 4.8 K/uL Final    Mono # 03/06/2024 0.5  0.3 - 1.0 K/uL Final    Eos # 03/06/2024 0.0  0.0 - 0.5 K/uL Final    Baso # 03/06/2024 0.04  0.00 - 0.20 K/uL Final    nRBC 03/06/2024 0  0 /100 WBC Final    Gran % 03/06/2024 64.3  38.0 - 73.0 % Final    Lymph % 03/06/2024 22.6  18.0 - 48.0 % Final    Mono % 03/06/2024 11.1  4.0 - 15.0 % Final    Eosinophil % 03/06/2024 0.9  0.0 - 8.0 % Final    Basophil % 03/06/2024 0.9  0.0 - 1.9 % Final    Differential Method 03/06/2024 Automated   Final    Sodium 03/06/2024 137  136 - 145 mmol/L Final    Potassium 03/06/2024 4.5  3.5 - 5.1 mmol/L Final    Chloride 03/06/2024 104  95 - 110 mmol/L Final    CO2 03/06/2024 25  23 - 29 mmol/L Final    Glucose 03/06/2024 101  70 - 110 mg/dL Final    BUN 03/06/2024 13  8 - 23 mg/dL Final    Creatinine 03/06/2024 0.9  0.5 - 1.4 mg/dL Final    Calcium 03/06/2024 9.2  8.7 - 10.5 mg/dL Final    Total Protein 03/06/2024 8.0  6.0 - 8.4 g/dL Final     Albumin 03/06/2024 3.3 (L)  3.5 - 5.2 g/dL Final    Total Bilirubin 03/06/2024 0.9  0.1 - 1.0 mg/dL Final    Comment: For infants and newborns, interpretation of results should be based  on gestational age, weight and in agreement with clinical  observations.    Premature Infant recommended reference ranges:  Up to 24 hours.............<8.0 mg/dL  Up to 48 hours............<12.0 mg/dL  3-5 days..................<15.0 mg/dL  6-29 days.................<15.0 mg/dL      Alkaline Phosphatase 03/06/2024 66  55 - 135 U/L Final    AST 03/06/2024 23  10 - 40 U/L Final    ALT 03/06/2024 18  10 - 44 U/L Final    eGFR 03/06/2024 >60  >60 mL/min/1.73 m^2 Final    Anion Gap 03/06/2024 8  8 - 16 mmol/L Final    Troponin I 03/06/2024 0.093 (H)  0.000 - 0.026 ng/mL Final    Comment: The reference interval for Troponin I represents the 99th percentile   cutoff   for our facility and is consistent with 3rd generation assay   performance.      BNP 03/06/2024 369 (H)  0 - 99 pg/mL Final    Values of less than 100 pg/ml are consistent with non-CHF populations.    Influenza A, Molecular 03/06/2024 Negative  Negative Final    Influenza B, Molecular 03/06/2024 Negative  Negative Final    Flu A & B Source 03/06/2024 Nasal swab   Final    SARS-CoV-2 RNA, Amplification, Qual 03/06/2024 Negative  Negative Final    Comment: This test utilizes isothermal nucleic acid amplification technology   to   detect the SARS-CoV-2 RdRp nucleic acid segment. The analytical   sensitivity   (limit of detection) is 500 copies/swab.    A POSITIVE result is indicative of the presence of SARS-CoV-2 RNA;   clinical   correlation with patient history and other diagnostic information is   necessary to determine patient infection status.    A NEGATIVE result means that SARS-CoV-2 nucleic acids are not present   above   the limit of detection. A NEGATIVE result should be treated as   presumptive.   It does not rule out the possibility of COVID-19 and should not be    the sole   basis for treatment decisions.    If COVID-19 is strongly suspected based on clinical and exposure   history,   re-testing using an alternate molecular assay should be considered.    This test is Food and Drug Administration (FDA) approved. Performance   characteristics of this has been independently verified by [facility] Department of Pat                           hology and Laboratory Medicine.       CTA Chest Non-Coronary (PE Studies)  Narrative: EXAMINATION:  CTA CHEST NON CORONARY (PE STUDIES)    CLINICAL HISTORY:  PE suspected, intermediate prob, neg D-dimer;, chest pain    COMPARISON:  None available.    TECHNIQUE:  Axial CT images were obtained of the chest.  Iterative reconstruction technique was used. The CT exam was performed using one or more of the following dose reduction techniques- Automated exposure control, adjustment of the mA and/or kV according to patient size, and/or use of iterative reconstructed technique.  Low dose axial images were obtained, sagittal and coronal reformations were created.  100 mL Omnipaque 350 IV contrast was administered.  Contrast timing was optimized to evaluate the vascular structures.  MIP images were performed.    FINDINGS:  Coronary artery calcification: Present.    Pulmonary Arteries: Slightly limited exam secondary to motion artifact in the lung bases.  No large or central pulmonary embolism.    Aorta: Mild atherosclerosis.  No aortic aneurysm.    Lungs/pleura: Small bilateral pleural effusions and adjacent atelectasis.    Heart: Borderline in size.  No pericardial effusion.    Bones/joints: No acute finding.    Other: No mediastinal or axillary lymphadenopathy.  No acute finding in the upper abdomen.  Impression: 1. No large or central pulmonary embolism.  Motion artifact limits evaluation of lower lung segmental and subsegmental arteries.  2. Borderline cardiomegaly and small bilateral pleural effusions which may reflect CHF/volume  overload.    Electronically signed by: Earl Botello  Date:    03/06/2024  Time:    13:02  X-Ray Chest 1 View  Narrative: EXAMINATION:  XR CHEST 1 VIEW    CLINICAL HISTORY:  shortness of breath;    TECHNIQUE:  Single frontal view of the chest was performed.    COMPARISON:  None    FINDINGS:  Mild cardiomegaly.    Mild pulmonary vascular congestion.  Shallow inspiration with slight elevation left hemidiaphragm.    Suspect very small right pleural effusion with blunting of the right costophrenic sulcus.  No advanced arthritic changes.  Impression: CHF    Electronically signed by: Oneil Burnham MD  Date:    03/06/2024  Time:    10:03     Results for orders placed or performed during the hospital encounter of 12/18/20   EKG 12-lead    Collection Time: 12/18/20  5:57 PM    Narrative    Test Reason : R55,    Vent. Rate : 071 BPM     Atrial Rate : 071 BPM     P-R Int : 166 ms          QRS Dur : 110 ms      QT Int : 428 ms       P-R-T Axes : 019 -26 -11 degrees     QTc Int : 465 ms    Normal sinus rhythm  Junctional ST depression, probably normal  Borderline Abnormal ECG  No previous ECGs available  Confirmed by Reg Romero MD ([id number]) on 12/21/2020 7:26:18 AM    Referred By:             Confirmed By:Reg Romero MD      Assessment:     1. Acute on chronic heart failure, unspecified heart failure type    2. Hospital discharge follow-up    3. Atherosclerosis of aorta    4. Coronary artery disease involving native coronary artery of native heart without angina pectoris    5. Routine health maintenance    6. Screening for prostate cancer    7. Orthopnea      Plan:   Acute on chronic heart failure, unspecified heart failure type  -     Ambulatory referral/consult to Cardiology; Future; Expected date: 03/20/2024    Hospital discharge follow-up  -     Ambulatory referral/consult to Cardiology; Future; Expected date: 03/20/2024    Atherosclerosis of aorta  -     rosuvastatin (CRESTOR) 10 MG tablet; Take 1 tablet (10 mg  total) by mouth once daily.  Dispense: 90 tablet; Refill: 3  -     Lipid Panel; Future; Expected date: 03/13/2024    Coronary artery disease involving native coronary artery of native heart without angina pectoris  -     rosuvastatin (CRESTOR) 10 MG tablet; Take 1 tablet (10 mg total) by mouth once daily.  Dispense: 90 tablet; Refill: 3  -     Lipid Panel; Future; Expected date: 03/13/2024    Routine health maintenance  -     CBC Auto Differential; Future; Expected date: 03/13/2024  -     Comprehensive Metabolic Panel; Future    Screening for prostate cancer  -     PSA, Screening; Future; Expected date: 03/13/2024    Orthopnea  -     CBC Auto Differential; Future; Expected date: 03/13/2024  -     Comprehensive Metabolic Panel; Future      -schedule labs before apt to establish care.   -need to get him established with cardiology and pcp.   Will schedule routine labs just before visit with new pcp.   -will get him in to see cardiology hopefully this week.     Follow up if symptoms worsen or fail to improve.

## 2024-04-01 ENCOUNTER — OFFICE VISIT (OUTPATIENT)
Dept: CARDIOLOGY | Facility: CLINIC | Age: 79
DRG: 270 | End: 2024-04-01
Payer: MEDICARE

## 2024-04-01 VITALS
HEIGHT: 70 IN | OXYGEN SATURATION: 97 % | HEART RATE: 120 BPM | BODY MASS INDEX: 41.57 KG/M2 | WEIGHT: 290.38 LBS | SYSTOLIC BLOOD PRESSURE: 134 MMHG | DIASTOLIC BLOOD PRESSURE: 86 MMHG

## 2024-04-01 DIAGNOSIS — I51.89 GRADE II DIASTOLIC DYSFUNCTION: ICD-10-CM

## 2024-04-01 DIAGNOSIS — I10 ESSENTIAL HYPERTENSION: ICD-10-CM

## 2024-04-01 DIAGNOSIS — I82.5Z2 CHRONIC DEEP VEIN THROMBOSIS (DVT) OF DISTAL VEIN OF LEFT LOWER EXTREMITY: ICD-10-CM

## 2024-04-01 DIAGNOSIS — I82.5Y2 CHRONIC DEEP VEIN THROMBOSIS (DVT) OF PROXIMAL VEIN OF LEFT LOWER EXTREMITY: ICD-10-CM

## 2024-04-01 DIAGNOSIS — I25.10 CORONARY ARTERY DISEASE INVOLVING NATIVE CORONARY ARTERY OF NATIVE HEART WITHOUT ANGINA PECTORIS: ICD-10-CM

## 2024-04-01 DIAGNOSIS — E66.01 MORBID OBESITY WITH BMI OF 45.0-49.9, ADULT: ICD-10-CM

## 2024-04-01 DIAGNOSIS — I87.2 VENOUS INSUFFICIENCY: ICD-10-CM

## 2024-04-01 DIAGNOSIS — Z09 HOSPITAL DISCHARGE FOLLOW-UP: ICD-10-CM

## 2024-04-01 DIAGNOSIS — I70.0 ATHEROSCLEROSIS OF AORTA: ICD-10-CM

## 2024-04-01 DIAGNOSIS — I50.9 ACUTE ON CHRONIC HEART FAILURE, UNSPECIFIED HEART FAILURE TYPE: Primary | ICD-10-CM

## 2024-04-01 DIAGNOSIS — I48.3 TYPICAL ATRIAL FLUTTER: ICD-10-CM

## 2024-04-01 PROCEDURE — 99205 OFFICE O/P NEW HI 60 MIN: CPT | Mod: S$PBB,,, | Performed by: INTERNAL MEDICINE

## 2024-04-01 PROCEDURE — 99214 OFFICE O/P EST MOD 30 MIN: CPT | Mod: PBBFAC | Performed by: INTERNAL MEDICINE

## 2024-04-01 PROCEDURE — 99999 PR PBB SHADOW E&M-EST. PATIENT-LVL IV: CPT | Mod: PBBFAC,,, | Performed by: INTERNAL MEDICINE

## 2024-04-01 RX ORDER — METOPROLOL SUCCINATE 25 MG/1
25 TABLET, EXTENDED RELEASE ORAL DAILY
Qty: 30 TABLET | Refills: 5 | Status: ON HOLD | OUTPATIENT
Start: 2024-04-01 | End: 2024-04-12 | Stop reason: HOSPADM

## 2024-04-01 RX ORDER — TORSEMIDE 20 MG/1
20 TABLET ORAL 2 TIMES DAILY
Qty: 66 TABLET | Refills: 11 | Status: ON HOLD | OUTPATIENT
Start: 2024-04-01 | End: 2024-04-12 | Stop reason: HOSPADM

## 2024-04-01 NOTE — PROGRESS NOTES
Subjective:   Patient ID:  Chucho Clark is a 78 y.o. male who presents for evaluation of Chest Pain (Chest pain, when breathing and laying down /Heavy chest pressure that will be in abdomen as well), Shortness of Breath, and Leg Swelling (Increased swelling in left leg)      79 yo male, referred for chf   PMH lymphedema on left leg since 1995 after DVT and worse after COVID, no h/o MI DM CVA cirrhosis. Smoked cigar for 40 years and quit 5 yrs. Drinks few a week,   C/o Orthopnea PND chest abd tightness and SOB  Good appetitie  No dizziness   2020 echo showed EF nml, dilated RV.   03/06/24 ER visit for SOB. Chest cta no PE and CXR CHF.   Added Lasix 40 mg daily  Weight gasin at least 18 lbs since ER visit  EKG Aflutter        No results found for this or any previous visit.     No results found for this or any previous visit.       Past Medical History:   Diagnosis Date    CHF (congestive heart failure)     DVT (deep venous thrombosis)     Hypertension     Renal disorder        History reviewed. No pertinent surgical history.    Social History     Tobacco Use    Smoking status: Former    Smokeless tobacco: Former   Substance Use Topics    Alcohol use: Yes    Drug use: Never       Family History   Problem Relation Age of Onset    Diabetes Mother     Cancer Mother        ROS    Objective:   Physical Exam  HENT:      Head: Normocephalic.   Eyes:      Pupils: Pupils are equal, round, and reactive to light.   Neck:      Thyroid: No thyromegaly.      Vascular: Normal carotid pulses. JVD present. No carotid bruit.   Cardiovascular:      Rate and Rhythm: Normal rate and regular rhythm. No extrasystoles are present.     Chest Wall: PMI is not displaced.      Pulses: Normal pulses.      Heart sounds: Normal heart sounds. No murmur heard.     No gallop. No S3 sounds.   Pulmonary:      Effort: No respiratory distress.      Breath sounds: No stridor. Rales present.   Abdominal:      General: Bowel sounds are normal.       "Palpations: Abdomen is soft.      Tenderness: There is no abdominal tenderness. There is no rebound.   Musculoskeletal:         General: Swelling present. Normal range of motion.   Skin:     Findings: No rash.   Neurological:      Mental Status: He is alert and oriented to person, place, and time.   Psychiatric:         Behavior: Behavior normal.         Lab Results   Component Value Date    CHOL 178 07/08/2020     Lab Results   Component Value Date    HDL 39 (L) 07/08/2020     Lab Results   Component Value Date    LDLCALC 121 07/08/2020     Lab Results   Component Value Date    TRIG 90 07/08/2020     No results found for: "CHOLHDL"    Chemistry        Component Value Date/Time     04/03/2024 0558    K 3.6 04/03/2024 0558     04/03/2024 0558    CO2 29 04/03/2024 0558    BUN 20 04/03/2024 0558    CREATININE 1.0 04/03/2024 0558    GLU 88 04/03/2024 0558        Component Value Date/Time    CALCIUM 9.4 04/03/2024 0558    ALKPHOS 61 04/03/2024 0558    AST 16 04/03/2024 0558    ALT 15 04/03/2024 0558    BILITOT 1.2 (H) 04/03/2024 0558    ESTGFRAFRICA >60.0 12/18/2020 1802    EGFRNONAA >60.0 12/18/2020 1802          Lab Results   Component Value Date    HGBA1C 5.2 04/02/2024     Lab Results   Component Value Date    TSH 0.654 04/02/2024     Lab Results   Component Value Date    INR 1.3 (H) 04/02/2024    INR 1.1 07/07/2020     Lab Results   Component Value Date    WBC 4.24 04/03/2024    HGB 14.1 04/03/2024    HCT 43.0 04/03/2024    MCV 98 04/03/2024     04/03/2024     BNP  @LABRCNTIP(BNP,BNPTRIAGEBLO)@  Estimated Creatinine Clearance: 83.1 mL/min (based on SCr of 1 mg/dL).  No results found in the last 24 hours.  No results found in the last 24 hours.  No results found in the last 24 hours.    Assessment:      1. Acute on chronic heart failure, unspecified heart failure type    2. Hospital discharge follow-up    3. Morbid obesity with BMI of 45.0-49.9, adult    4. Chronic deep vein thrombosis (DVT) of " distal vein of left lower extremity    5. Typical atrial flutter    6. Chronic deep vein thrombosis (DVT) of proximal vein of left lower extremity    7. Atherosclerosis of aorta    8. Coronary artery disease involving native coronary artery of native heart without angina pectoris    9. Essential hypertension    10. Grade II diastolic dysfunction    11. Venous insufficiency      AFL RVR   CHF fluid overloaded  Plan:   Pt and the family are reluctant to go to ER now  D/c lasix and add torsemide 20 mg bid  Check BMP in 1 week  Add toprolXl 25 mg daily  Check BP at home  Add eliquis 5 mg bid  Echo ordered  Avoid pump now  RTC in 1 week or go to ER if the symptoms worse

## 2024-04-02 ENCOUNTER — HOSPITAL ENCOUNTER (INPATIENT)
Facility: HOSPITAL | Age: 79
LOS: 9 days | Discharge: NURSING, REHAB OR LONG-TERM CARE | DRG: 270 | End: 2024-04-12
Attending: EMERGENCY MEDICINE | Admitting: INTERNAL MEDICINE
Payer: MEDICARE

## 2024-04-02 DIAGNOSIS — I25.112 CORONARY ARTERY DISEASE INVOLVING NATIVE HEART WITH REFRACTORY ANGINA PECTORIS, UNSPECIFIED VESSEL OR LESION TYPE: ICD-10-CM

## 2024-04-02 DIAGNOSIS — I25.10 CORONARY ARTERY DISEASE INVOLVING NATIVE HEART, UNSPECIFIED VESSEL OR LESION TYPE, UNSPECIFIED WHETHER ANGINA PRESENT: ICD-10-CM

## 2024-04-02 DIAGNOSIS — I50.9 ACUTE CONGESTIVE HEART FAILURE, UNSPECIFIED HEART FAILURE TYPE: ICD-10-CM

## 2024-04-02 DIAGNOSIS — I48.91 A-FIB: ICD-10-CM

## 2024-04-02 DIAGNOSIS — I50.9 ACUTE ON CHRONIC CONGESTIVE HEART FAILURE: ICD-10-CM

## 2024-04-02 DIAGNOSIS — Z98.890 ON INTRA-AORTIC BALLOON PUMP ASSIST: ICD-10-CM

## 2024-04-02 DIAGNOSIS — I48.92 ATRIAL FLUTTER, UNSPECIFIED TYPE: ICD-10-CM

## 2024-04-02 DIAGNOSIS — I25.10 CAD (CORONARY ARTERY DISEASE): ICD-10-CM

## 2024-04-02 DIAGNOSIS — Z95.820 S/P ANGIOPLASTY WITH STENT: ICD-10-CM

## 2024-04-02 DIAGNOSIS — I25.10 CORONARY ARTERY DISEASE INVOLVING NATIVE CORONARY ARTERY OF NATIVE HEART WITHOUT ANGINA PECTORIS: ICD-10-CM

## 2024-04-02 DIAGNOSIS — I50.9 ACUTE ON CHRONIC CONGESTIVE HEART FAILURE, UNSPECIFIED HEART FAILURE TYPE: ICD-10-CM

## 2024-04-02 DIAGNOSIS — I44.7 LEFT BUNDLE BRANCH BLOCK: ICD-10-CM

## 2024-04-02 DIAGNOSIS — I50.9 CONGESTIVE HEART FAILURE, UNSPECIFIED HF CHRONICITY, UNSPECIFIED HEART FAILURE TYPE: ICD-10-CM

## 2024-04-02 DIAGNOSIS — R00.0 TACHYCARDIA: ICD-10-CM

## 2024-04-02 DIAGNOSIS — I20.0 UNSTABLE ANGINA PECTORIS: ICD-10-CM

## 2024-04-02 DIAGNOSIS — I50.43 SYSTOLIC AND DIASTOLIC CHF, ACUTE ON CHRONIC: ICD-10-CM

## 2024-04-02 DIAGNOSIS — R07.9 CHEST PAIN: ICD-10-CM

## 2024-04-02 DIAGNOSIS — I48.92 ATRIAL FLUTTER: ICD-10-CM

## 2024-04-02 DIAGNOSIS — R79.89 ELEVATED TROPONIN: Primary | ICD-10-CM

## 2024-04-02 DIAGNOSIS — R07.9 CHEST PAIN, UNSPECIFIED TYPE: ICD-10-CM

## 2024-04-02 PROBLEM — G57.11 MERALGIA PARAESTHETICA, RIGHT: Status: ACTIVE | Noted: 2020-07-15

## 2024-04-02 PROBLEM — R06.01 ORTHOPNEA: Status: ACTIVE | Noted: 2024-04-02

## 2024-04-02 LAB
ALBUMIN SERPL BCP-MCNC: 3.2 G/DL (ref 3.5–5.2)
ALP SERPL-CCNC: 59 U/L (ref 55–135)
ALT SERPL W/O P-5'-P-CCNC: 20 U/L (ref 10–44)
ANION GAP SERPL CALC-SCNC: 10 MMOL/L (ref 8–16)
APTT PPP: 29.3 SEC (ref 21–32)
APTT PPP: 47.7 SEC (ref 21–32)
APTT PPP: 50.7 SEC (ref 21–32)
AST SERPL-CCNC: 20 U/L (ref 10–40)
BASOPHILS # BLD AUTO: 0.02 K/UL (ref 0–0.2)
BASOPHILS NFR BLD: 0.5 % (ref 0–1.9)
BILIRUB SERPL-MCNC: 1.1 MG/DL (ref 0.1–1)
BNP SERPL-MCNC: 560 PG/ML (ref 0–99)
BUN SERPL-MCNC: 20 MG/DL (ref 8–23)
CALCIUM SERPL-MCNC: 9.2 MG/DL (ref 8.7–10.5)
CHLORIDE SERPL-SCNC: 107 MMOL/L (ref 95–110)
CO2 SERPL-SCNC: 23 MMOL/L (ref 23–29)
CREAT SERPL-MCNC: 1 MG/DL (ref 0.5–1.4)
DIFFERENTIAL METHOD BLD: ABNORMAL
EOSINOPHIL # BLD AUTO: 0 K/UL (ref 0–0.5)
EOSINOPHIL NFR BLD: 0.7 % (ref 0–8)
ERYTHROCYTE [DISTWIDTH] IN BLOOD BY AUTOMATED COUNT: 13.4 % (ref 11.5–14.5)
EST. GFR  (NO RACE VARIABLE): >60 ML/MIN/1.73 M^2
ESTIMATED AVG GLUCOSE: 103 MG/DL (ref 68–131)
GLUCOSE SERPL-MCNC: 105 MG/DL (ref 70–110)
HBA1C MFR BLD: 5.2 % (ref 4–5.6)
HCT VFR BLD AUTO: 44.7 % (ref 40–54)
HGB BLD-MCNC: 14.7 G/DL (ref 14–18)
IMM GRANULOCYTES # BLD AUTO: 0.01 K/UL (ref 0–0.04)
IMM GRANULOCYTES NFR BLD AUTO: 0.2 % (ref 0–0.5)
INR PPP: 1.3 (ref 0.8–1.2)
LYMPHOCYTES # BLD AUTO: 1 K/UL (ref 1–4.8)
LYMPHOCYTES NFR BLD: 24.2 % (ref 18–48)
MCH RBC QN AUTO: 32.2 PG (ref 27–31)
MCHC RBC AUTO-ENTMCNC: 32.9 G/DL (ref 32–36)
MCV RBC AUTO: 98 FL (ref 82–98)
MONOCYTES # BLD AUTO: 0.5 K/UL (ref 0.3–1)
MONOCYTES NFR BLD: 10.7 % (ref 4–15)
NEUTROPHILS # BLD AUTO: 2.7 K/UL (ref 1.8–7.7)
NEUTROPHILS NFR BLD: 63.7 % (ref 38–73)
NRBC BLD-RTO: 0 /100 WBC
PLATELET # BLD AUTO: 162 K/UL (ref 150–450)
PMV BLD AUTO: 9.4 FL (ref 9.2–12.9)
POTASSIUM SERPL-SCNC: 4.1 MMOL/L (ref 3.5–5.1)
PROT SERPL-MCNC: 7.5 G/DL (ref 6–8.4)
PROTHROMBIN TIME: 15 SEC (ref 9–12.5)
RBC # BLD AUTO: 4.57 M/UL (ref 4.6–6.2)
SODIUM SERPL-SCNC: 140 MMOL/L (ref 136–145)
TROPONIN I SERPL DL<=0.01 NG/ML-MCNC: 0.05 NG/ML (ref 0–0.03)
TROPONIN I SERPL DL<=0.01 NG/ML-MCNC: 0.06 NG/ML (ref 0–0.03)
TROPONIN I SERPL DL<=0.01 NG/ML-MCNC: 0.06 NG/ML (ref 0–0.03)
TSH SERPL DL<=0.005 MIU/L-ACNC: 0.65 UIU/ML (ref 0.4–4)
WBC # BLD AUTO: 4.29 K/UL (ref 3.9–12.7)

## 2024-04-02 PROCEDURE — 25000003 PHARM REV CODE 250: Performed by: NURSE PRACTITIONER

## 2024-04-02 PROCEDURE — 96375 TX/PRO/DX INJ NEW DRUG ADDON: CPT

## 2024-04-02 PROCEDURE — 96361 HYDRATE IV INFUSION ADD-ON: CPT

## 2024-04-02 PROCEDURE — 85025 COMPLETE CBC W/AUTO DIFF WBC: CPT | Performed by: EMERGENCY MEDICINE

## 2024-04-02 PROCEDURE — 85610 PROTHROMBIN TIME: CPT | Performed by: EMERGENCY MEDICINE

## 2024-04-02 PROCEDURE — 83880 ASSAY OF NATRIURETIC PEPTIDE: CPT | Performed by: EMERGENCY MEDICINE

## 2024-04-02 PROCEDURE — 85730 THROMBOPLASTIN TIME PARTIAL: CPT | Performed by: EMERGENCY MEDICINE

## 2024-04-02 PROCEDURE — 96366 THER/PROPH/DIAG IV INF ADDON: CPT

## 2024-04-02 PROCEDURE — 99223 1ST HOSP IP/OBS HIGH 75: CPT | Mod: 25,,, | Performed by: INTERNAL MEDICINE

## 2024-04-02 PROCEDURE — 85730 THROMBOPLASTIN TIME PARTIAL: CPT | Mod: 91 | Performed by: INTERNAL MEDICINE

## 2024-04-02 PROCEDURE — 63600175 PHARM REV CODE 636 W HCPCS: Performed by: EMERGENCY MEDICINE

## 2024-04-02 PROCEDURE — 36415 COLL VENOUS BLD VENIPUNCTURE: CPT | Performed by: EMERGENCY MEDICINE

## 2024-04-02 PROCEDURE — 63600175 PHARM REV CODE 636 W HCPCS: Performed by: INTERNAL MEDICINE

## 2024-04-02 PROCEDURE — 83036 HEMOGLOBIN GLYCOSYLATED A1C: CPT | Performed by: EMERGENCY MEDICINE

## 2024-04-02 PROCEDURE — G0378 HOSPITAL OBSERVATION PER HR: HCPCS

## 2024-04-02 PROCEDURE — 25000003 PHARM REV CODE 250: Performed by: INTERNAL MEDICINE

## 2024-04-02 PROCEDURE — 93005 ELECTROCARDIOGRAM TRACING: CPT

## 2024-04-02 PROCEDURE — 25000003 PHARM REV CODE 250: Performed by: EMERGENCY MEDICINE

## 2024-04-02 PROCEDURE — 84484 ASSAY OF TROPONIN QUANT: CPT | Mod: 91 | Performed by: NURSE PRACTITIONER

## 2024-04-02 PROCEDURE — 84484 ASSAY OF TROPONIN QUANT: CPT | Performed by: EMERGENCY MEDICINE

## 2024-04-02 PROCEDURE — 36415 COLL VENOUS BLD VENIPUNCTURE: CPT | Mod: XB | Performed by: INTERNAL MEDICINE

## 2024-04-02 PROCEDURE — 93010 ELECTROCARDIOGRAM REPORT: CPT | Mod: ,,, | Performed by: INTERNAL MEDICINE

## 2024-04-02 PROCEDURE — 96376 TX/PRO/DX INJ SAME DRUG ADON: CPT

## 2024-04-02 PROCEDURE — 80053 COMPREHEN METABOLIC PANEL: CPT | Performed by: EMERGENCY MEDICINE

## 2024-04-02 PROCEDURE — 99291 CRITICAL CARE FIRST HOUR: CPT | Mod: 25

## 2024-04-02 PROCEDURE — 84443 ASSAY THYROID STIM HORMONE: CPT | Performed by: EMERGENCY MEDICINE

## 2024-04-02 PROCEDURE — 96365 THER/PROPH/DIAG IV INF INIT: CPT

## 2024-04-02 RX ORDER — SODIUM CHLORIDE 0.9 % (FLUSH) 0.9 %
10 SYRINGE (ML) INJECTION EVERY 12 HOURS PRN
Status: DISCONTINUED | OUTPATIENT
Start: 2024-04-02 | End: 2024-04-12 | Stop reason: HOSPADM

## 2024-04-02 RX ORDER — ATORVASTATIN CALCIUM 40 MG/1
40 TABLET, FILM COATED ORAL DAILY
Status: DISCONTINUED | OUTPATIENT
Start: 2024-04-02 | End: 2024-04-12 | Stop reason: HOSPADM

## 2024-04-02 RX ORDER — MORPHINE SULFATE 4 MG/ML
2 INJECTION, SOLUTION INTRAMUSCULAR; INTRAVENOUS EVERY 4 HOURS PRN
Status: DISCONTINUED | OUTPATIENT
Start: 2024-04-02 | End: 2024-04-12 | Stop reason: HOSPADM

## 2024-04-02 RX ORDER — FUROSEMIDE 10 MG/ML
40 INJECTION INTRAMUSCULAR; INTRAVENOUS
Status: COMPLETED | OUTPATIENT
Start: 2024-04-02 | End: 2024-04-02

## 2024-04-02 RX ORDER — ACETAMINOPHEN 325 MG/1
650 TABLET ORAL EVERY 4 HOURS PRN
Status: DISCONTINUED | OUTPATIENT
Start: 2024-04-02 | End: 2024-04-08 | Stop reason: SDUPTHER

## 2024-04-02 RX ORDER — METOLAZONE 5 MG/1
5 TABLET ORAL DAILY
Status: DISCONTINUED | OUTPATIENT
Start: 2024-04-02 | End: 2024-04-03

## 2024-04-02 RX ORDER — ALUMINUM HYDROXIDE, MAGNESIUM HYDROXIDE, AND SIMETHICONE 1200; 120; 1200 MG/30ML; MG/30ML; MG/30ML
30 SUSPENSION ORAL 4 TIMES DAILY PRN
Status: DISCONTINUED | OUTPATIENT
Start: 2024-04-02 | End: 2024-04-12 | Stop reason: HOSPADM

## 2024-04-02 RX ORDER — BISACODYL 10 MG/1
10 SUPPOSITORY RECTAL DAILY PRN
Status: DISCONTINUED | OUTPATIENT
Start: 2024-04-02 | End: 2024-04-12 | Stop reason: HOSPADM

## 2024-04-02 RX ORDER — NAPROXEN SODIUM 220 MG/1
81 TABLET, FILM COATED ORAL DAILY
Status: DISCONTINUED | OUTPATIENT
Start: 2024-04-02 | End: 2024-04-02

## 2024-04-02 RX ORDER — SODIUM CHLORIDE 0.9 % (FLUSH) 0.9 %
10 SYRINGE (ML) INJECTION
Status: DISCONTINUED | OUTPATIENT
Start: 2024-04-02 | End: 2024-04-12 | Stop reason: HOSPADM

## 2024-04-02 RX ORDER — FUROSEMIDE 10 MG/ML
60 INJECTION INTRAMUSCULAR; INTRAVENOUS EVERY 12 HOURS
Status: DISCONTINUED | OUTPATIENT
Start: 2024-04-02 | End: 2024-04-02

## 2024-04-02 RX ORDER — NALOXONE HCL 0.4 MG/ML
0.02 VIAL (ML) INJECTION
Status: DISCONTINUED | OUTPATIENT
Start: 2024-04-02 | End: 2024-04-12 | Stop reason: HOSPADM

## 2024-04-02 RX ORDER — FUROSEMIDE 10 MG/ML
60 INJECTION INTRAMUSCULAR; INTRAVENOUS EVERY 8 HOURS
Status: DISCONTINUED | OUTPATIENT
Start: 2024-04-02 | End: 2024-04-03

## 2024-04-02 RX ORDER — NAPROXEN SODIUM 220 MG/1
162 TABLET, FILM COATED ORAL
Status: ACTIVE | OUTPATIENT
Start: 2024-04-02 | End: 2024-04-02

## 2024-04-02 RX ORDER — ONDANSETRON HYDROCHLORIDE 2 MG/ML
4 INJECTION, SOLUTION INTRAVENOUS EVERY 8 HOURS PRN
Status: DISCONTINUED | OUTPATIENT
Start: 2024-04-02 | End: 2024-04-12 | Stop reason: HOSPADM

## 2024-04-02 RX ORDER — GLUCAGON 1 MG
1 KIT INJECTION
Status: DISCONTINUED | OUTPATIENT
Start: 2024-04-02 | End: 2024-04-12 | Stop reason: HOSPADM

## 2024-04-02 RX ORDER — IBUPROFEN 200 MG
24 TABLET ORAL
Status: DISCONTINUED | OUTPATIENT
Start: 2024-04-02 | End: 2024-04-12 | Stop reason: HOSPADM

## 2024-04-02 RX ORDER — IBUPROFEN 200 MG
16 TABLET ORAL
Status: DISCONTINUED | OUTPATIENT
Start: 2024-04-02 | End: 2024-04-12 | Stop reason: HOSPADM

## 2024-04-02 RX ORDER — TALC
6 POWDER (GRAM) TOPICAL NIGHTLY PRN
Status: DISCONTINUED | OUTPATIENT
Start: 2024-04-02 | End: 2024-04-12 | Stop reason: HOSPADM

## 2024-04-02 RX ORDER — METOPROLOL TARTRATE 1 MG/ML
5 INJECTION, SOLUTION INTRAVENOUS
Status: COMPLETED | OUTPATIENT
Start: 2024-04-02 | End: 2024-04-02

## 2024-04-02 RX ORDER — NAPROXEN SODIUM 220 MG/1
162 TABLET, FILM COATED ORAL
Status: COMPLETED | OUTPATIENT
Start: 2024-04-02 | End: 2024-04-02

## 2024-04-02 RX ORDER — HEPARIN SODIUM,PORCINE/D5W 25000/250
0-40 INTRAVENOUS SOLUTION INTRAVENOUS CONTINUOUS
Status: DISCONTINUED | OUTPATIENT
Start: 2024-04-02 | End: 2024-04-05 | Stop reason: SDUPTHER

## 2024-04-02 RX ORDER — METOPROLOL SUCCINATE 25 MG/1
25 TABLET, EXTENDED RELEASE ORAL DAILY
Status: DISCONTINUED | OUTPATIENT
Start: 2024-04-02 | End: 2024-04-06

## 2024-04-02 RX ADMIN — ATORVASTATIN CALCIUM 40 MG: 40 TABLET, FILM COATED ORAL at 02:04

## 2024-04-02 RX ADMIN — HEPARIN SODIUM 12 UNITS/KG/HR: 10000 INJECTION, SOLUTION INTRAVENOUS at 11:04

## 2024-04-02 RX ADMIN — METOPROLOL SUCCINATE 25 MG: 25 TABLET, EXTENDED RELEASE ORAL at 02:04

## 2024-04-02 RX ADMIN — ASPIRIN 81 MG CHEWABLE TABLET 162 MG: 81 TABLET CHEWABLE at 08:04

## 2024-04-02 RX ADMIN — FUROSEMIDE 60 MG: 10 INJECTION, SOLUTION INTRAMUSCULAR; INTRAVENOUS at 02:04

## 2024-04-02 RX ADMIN — METOLAZONE 5 MG: 5 TABLET ORAL at 02:04

## 2024-04-02 RX ADMIN — FUROSEMIDE 40 MG: 10 INJECTION, SOLUTION INTRAMUSCULAR; INTRAVENOUS at 10:04

## 2024-04-02 RX ADMIN — FUROSEMIDE 60 MG: 10 INJECTION, SOLUTION INTRAMUSCULAR; INTRAVENOUS at 09:04

## 2024-04-02 RX ADMIN — METOROPROLOL TARTRATE 5 MG: 5 INJECTION, SOLUTION INTRAVENOUS at 09:04

## 2024-04-02 RX ADMIN — NITROGLYCERIN 1 INCH: 20 OINTMENT TOPICAL at 11:04

## 2024-04-02 RX ADMIN — SODIUM CHLORIDE 500 ML: 9 INJECTION, SOLUTION INTRAVENOUS at 08:04

## 2024-04-02 NOTE — ED NOTES
Pt resting in ED stretcher comfortably, skin warm and dry, RR even and unlabored. CRISTINAS. NADN.

## 2024-04-02 NOTE — Clinical Note
The PA catheter was inserted into the main pulmonary artery. Hemodynamics were performed. Lawrence wire used to obtain access.

## 2024-04-02 NOTE — ASSESSMENT & PLAN NOTE
Patient is identified as having  Unknown, previous Diastolic HF  heart failure that is Acute on chronic. CHF is currently uncontrolled due to >3 pillow orthopnea and Pulmonary edema/pleural effusion on CXR. Latest ECHO performed and demonstrates- No results found for this or any previous visit.  . Continue Beta Blocker and Furosemide and monitor clinical status closely. Monitor on telemetry. Patient is on CHF pathway.  Monitor strict Is&Os and daily weights.  Place on fluid restriction of 1.5 L. Cardiology has been consulted. Continue to stress to patient importance of self efficacy and  on diet for CHF. Last BNP reviewed- and noted below   Recent Labs   Lab 04/02/24  0817   *

## 2024-04-02 NOTE — Clinical Note
A percutaneous stick to the left brachial artery was performed. Ultrasound guidance was used to obtain access.

## 2024-04-02 NOTE — ASSESSMENT & PLAN NOTE
Heparin iv   Trend trop   Echo   Aspirin   Will need ischemic work up prior to discharge  Cw toprol

## 2024-04-02 NOTE — Clinical Note
The left ventricle was injected. The injected rate was 12 mL/sec. The total injected volume was 25 mL.

## 2024-04-02 NOTE — ASSESSMENT & PLAN NOTE
Chronic, uncontrolled. Latest blood pressure and vitals reviewed-     Temp:  [97.8 °F (36.6 °C)]   Pulse:  [114-121]   Resp:  [22]   BP: (111-134)/(69-86)   SpO2:  [91 %-97 %] .   Home meds for hypertension were reviewed and noted below.   Hypertension Medications               metoprolol succinate (TOPROL-XL) 25 MG 24 hr tablet Take 1 tablet (25 mg total) by mouth once daily.    torsemide (DEMADEX) 20 MG Tab Take 1 tablet (20 mg total) by mouth 2 (two) times a day.            While in the hospital, will manage blood pressure as follows; Continue home antihypertensive regimen    Will utilize p.r.n. blood pressure medication only if patient's blood pressure greater than 160/100 and he develops symptoms such as worsening chest pain or shortness of breath.

## 2024-04-02 NOTE — ADMISSIONCARE
AdmissionCare    Guideline: Chest Pain - OBS, Observation    Based on the indications selected for the patient, the bed status of Observation was determined to be MET    The following indications were selected as present at the time of evaluation of the patient:      - Other aspect of patient presentation indicative of need for monitoring or testing beyond emergency department treatment time frame (eg, concern for arrhythmia)    AdmissionCare documentation entered by: Kylee Eduardo    [facility] [employer], 27th edition, Copyright © 2023 [facility] [employer], [facility] All Rights Reserved.  [id number]:00

## 2024-04-02 NOTE — Clinical Note
The catheter was inserted into the mid   right coronary artery.  Angiogram performed through fine cross

## 2024-04-02 NOTE — ASSESSMENT & PLAN NOTE
Possibly secondary to demand ischemia. Reports orthopnea over the past 2 weeks and has had to remain upright, sleeping in a chair, intermittently. Chest pain, Dyspnea at home PTA. Seen by Cardiology on 4/1. Cardiology consulted, recommended Heparin infusion, Trend trop.    --trend trop  --Cont diuresis  --Tele  --Heparin per nomogram  --Nitro PRN chest pain  --Repeat EKG PRN  --Vitals Q4H  --NPO after midnight

## 2024-04-02 NOTE — Clinical Note
The left groin was prepped. The site was prepped with ChloraPrep. The site was clipped. The patient was draped.

## 2024-04-02 NOTE — PHARMACY MED REC
"Admission Medication History     The home medication history was taken by Ruben Jernigan.    You may go to "Admission" then "Reconcile Home Medications" tabs to review and/or act upon these items.     The home medication list has been updated by the Pharmacy department.   Please read ALL comments highlighted in yellow.   Please address this information as you see fit.    Feel free to contact us if you have any questions or require assistance.      Medications listed below were obtained from: Patient/family and Analytic software- [employer]  (Not in a hospital admission)        Ruben Jernigan  [id number]      Current Outpatient Medications on File Prior to Encounter   Medication Sig Dispense Refill Last Dose    apixaban (ELIQUIS) 5 mg Tab Take 1 tablet (5 mg total) by mouth 2 (two) times daily. 60 tablet 5 4/2/2024    metoprolol succinate (TOPROL-XL) 25 MG 24 hr tablet Take 1 tablet (25 mg total) by mouth once daily. 30 tablet 5 4/2/2024    rosuvastatin (CRESTOR) 10 MG tablet Take 1 tablet (10 mg total) by mouth once daily. 90 tablet 3 4/2/2024    torsemide (DEMADEX) 20 MG Tab Take 1 tablet (20 mg total) by mouth 2 (two) times a day. 66 tablet 11 4/2/2024                           .          "

## 2024-04-02 NOTE — SUBJECTIVE & OBJECTIVE
Past Medical History:   Diagnosis Date    CHF (congestive heart failure)     DVT (deep venous thrombosis)     Hypertension     Renal disorder        History reviewed. No pertinent surgical history.    Review of patient's allergies indicates:   Allergen Reactions    Vancomycin Hives     Patient given vancomycin on 7/7/2020 developed hives.  Vancomycin added to allergy list.       No current facility-administered medications on file prior to encounter.     Current Outpatient Medications on File Prior to Encounter   Medication Sig    apixaban (ELIQUIS) 5 mg Tab Take 1 tablet (5 mg total) by mouth 2 (two) times daily.    metoprolol succinate (TOPROL-XL) 25 MG 24 hr tablet Take 1 tablet (25 mg total) by mouth once daily.    rosuvastatin (CRESTOR) 10 MG tablet Take 1 tablet (10 mg total) by mouth once daily.    torsemide (DEMADEX) 20 MG Tab Take 1 tablet (20 mg total) by mouth 2 (two) times a day.     Family History       Problem Relation (Age of Onset)    Cancer Mother    Diabetes Mother          Tobacco Use    Smoking status: Former    Smokeless tobacco: Former   Substance and Sexual Activity    Alcohol use: Yes    Drug use: Never    Sexual activity: Not on file     Review of Systems   Cardiovascular:  Positive for chest pain and dyspnea on exertion. Negative for palpitations and syncope.   Genitourinary: Negative.    Neurological: Negative.      Objective:     Vital Signs (Most Recent):  Temp: 97.8 °F (36.6 °C) (04/02/24 0741)  Pulse: (!) 118 (04/02/24 1002)  Resp: (!) 22 (04/02/24 0741)  BP: 114/80 (04/02/24 1002)  SpO2: (!) 91 % (04/02/24 1002) Vital Signs (24h Range):  Temp:  [97.8 °F (36.6 °C)] 97.8 °F (36.6 °C)  Pulse:  [114-121] 118  Resp:  [22] 22  SpO2:  [91 %-97 %] 91 %  BP: (111-134)/(69-86) 114/80     Weight: 129.8 kg (286 lb 2.5 oz)  Body mass index is 39.91 kg/m².    SpO2: (!) 91 %         Intake/Output Summary (Last 24 hours) at 4/2/2024 1246  Last data filed at 4/2/2024 1006  Gross per 24 hour   Intake  250 ml   Output --   Net 250 ml       Lines/Drains/Airways       Peripheral Intravenous Line  Duration                  Peripheral IV - Single Lumen 04/02/24 0813 20 G Posterior;Right Forearm <1 day         Peripheral IV - Single Lumen 04/02/24 1224 20 G Left;Posterior Hand <1 day                     Physical Exam  Vitals reviewed.   Constitutional:       Appearance: He is well-developed.   Neck:      Vascular: No carotid bruit.   Cardiovascular:      Rate and Rhythm: Normal rate and regular rhythm.      Pulses: Intact distal pulses.      Heart sounds: Normal heart sounds. No murmur heard.  Pulmonary:      Breath sounds: Rales present.   Neurological:      Mental Status: He is oriented to person, place, and time.          Significant Labs: All pertinent lab results from the last 24 hours have been reviewed. and   Recent Lab Results         04/02/24  0817        Albumin 3.2       ALP 59       ALT 20       Anion Gap 10       PTT 29.3  Comment: Refer to local heparin nomogram for intensity/dose specific   therapeutic   range.         AST 20       Baso # 0.02       Basophil % 0.5       BILIRUBIN TOTAL 1.1  Comment: For infants and newborns, interpretation of results should be based  on gestational age, weight and in agreement with clinical  observations.    Premature Infant recommended reference ranges:  Up to 24 hours.............<8.0 mg/dL  Up to 48 hours............<12.0 mg/dL  3-5 days..................<15.0 mg/dL  6-29 days.................<15.0 mg/dL           Comment: Values of less than 100 pg/ml are consistent with non-CHF populations.       BUN 20       Calcium 9.2       Chloride 107       CO2 23       Creatinine 1.0       Differential Method Automated       eGFR >60       Eos # 0.0       Eos % 0.7       Glucose 105       Gran # (ANC) 2.7       Gran % 63.7       Hematocrit 44.7       Hemoglobin 14.7       Immature Grans (Abs) 0.01  Comment: Mild elevation in immature granulocytes is non specific and    can be seen in a variety of conditions including stress response,   acute inflammation, trauma and pregnancy. Correlation with other   laboratory and clinical findings is essential.         Immature Granulocytes 0.2       INR 1.3  Comment: Coumadin Therapy:  2.0 - 3.0 for INR for all indicators except mechanical heart valves  and antiphospholipid syndromes which should use 2.5 - 3.5.         Lymph # 1.0       Lymph % 24.2       MCH 32.2       MCHC 32.9       MCV 98       Mono # 0.5       Mono % 10.7       MPV 9.4       nRBC 0       Platelet Count 162       Potassium 4.1       PROTEIN TOTAL 7.5       PT 15.0       RBC 4.57       RDW 13.4       Sodium 140       Troponin I 0.064  Comment: The reference interval for Troponin I represents the 99th percentile   cutoff   for our facility and is consistent with 3rd generation assay   performance.         WBC 4.29               Significant Imaging: Echocardiogram: pending

## 2024-04-02 NOTE — HPI
78 y.o. male patient, PMHx: Diastolic HF, HTN, DVT, CAD. Presented to the Emergency Department for evaluation of CP which onset night PTA. Pt visited Dr. Corrales (Cardiology) for the first time on 4/1/24 and was initiated on Eliquis. Pt notes that he also felt some upper abd pain PTA that felt like acid reflux. Pt and son originally thought sxs were cold/flu sxs, but tested negative in the clinic. Symptoms are constant and moderate in severity. No mitigating or exacerbating factors reported. Associated sxs include SOB, palpitations, and increased HR. Patient denies any cough, numbness, HA, fever, n/v/d, and all other sxs at this time. Pt has not had a stress test. In the ED, vitals: 130/69, 121, 22, 97.8F, 95% RA. Significant Labs: BNP: 560, Trop: 0.064, Bili: 1.1. CXR: interstitial edema. EKG: Neg for STEMI. Cardiology consulted in ED. Treated with ASA, Nitro, BB, diuretic. Initiated on Heparin Infusion. Patient is a full code. Placed in observation under the care of Hospital Medicine for management of elevated troponin, ACS rule out.

## 2024-04-02 NOTE — Clinical Note
160 ml of contrast were injected throughout the case. 0 mL of contrast was the total wasted during the case. 160 mL was the total amount used during the case.

## 2024-04-02 NOTE — SUBJECTIVE & OBJECTIVE
Past Medical History:   Diagnosis Date    CHF (congestive heart failure)     DVT (deep venous thrombosis)     Hypertension     Renal disorder        History reviewed. No pertinent surgical history.    Review of patient's allergies indicates:   Allergen Reactions    Vancomycin Hives     Patient given vancomycin on 7/7/2020 developed hives.  Vancomycin added to allergy list.       No current facility-administered medications on file prior to encounter.     Current Outpatient Medications on File Prior to Encounter   Medication Sig    apixaban (ELIQUIS) 5 mg Tab Take 1 tablet (5 mg total) by mouth 2 (two) times daily.    metoprolol succinate (TOPROL-XL) 25 MG 24 hr tablet Take 1 tablet (25 mg total) by mouth once daily.    rosuvastatin (CRESTOR) 10 MG tablet Take 1 tablet (10 mg total) by mouth once daily.    torsemide (DEMADEX) 20 MG Tab Take 1 tablet (20 mg total) by mouth 2 (two) times a day.     Family History       Problem Relation (Age of Onset)    Cancer Mother    Diabetes Mother          Tobacco Use    Smoking status: Former    Smokeless tobacco: Former   Substance and Sexual Activity    Alcohol use: Yes    Drug use: Never    Sexual activity: Not on file     Review of Systems   Respiratory:  Positive for chest tightness and shortness of breath.    Cardiovascular:  Positive for chest pain and leg swelling.   Skin:  Positive for color change and wound.   All other systems reviewed and are negative.    Objective:     Vital Signs (Most Recent):  Temp: 97.8 °F (36.6 °C) (04/02/24 0741)  Pulse: (!) 118 (04/02/24 1002)  Resp: (!) 22 (04/02/24 0741)  BP: 114/80 (04/02/24 1002)  SpO2: (!) 91 % (04/02/24 1002) Vital Signs (24h Range):  Temp:  [97.8 °F (36.6 °C)] 97.8 °F (36.6 °C)  Pulse:  [114-121] 118  Resp:  [22] 22  SpO2:  [91 %-97 %] 91 %  BP: (111-134)/(69-86) 114/80     Weight: 129.8 kg (286 lb 2.5 oz)  Body mass index is 39.91 kg/m².     Physical Exam  Vitals and nursing note reviewed.   Constitutional:        General: He is not in acute distress.     Appearance: He is well-developed. He is not diaphoretic.   HENT:      Head: Normocephalic and atraumatic.      Right Ear: Hearing and external ear normal.      Left Ear: Hearing and external ear normal.      Nose: Nose normal. No mucosal edema or rhinorrhea.      Mouth/Throat:      Pharynx: Uvula midline.   Eyes:      General:         Right eye: No discharge.         Left eye: No discharge.      Conjunctiva/sclera: Conjunctivae normal.      Right eye: No chemosis.     Left eye: No chemosis.     Pupils: Pupils are equal, round, and reactive to light.   Neck:      Thyroid: No thyroid mass or thyromegaly.      Trachea: Trachea normal.   Cardiovascular:      Rate and Rhythm: Regular rhythm. Tachycardia present.      Heart sounds: Normal heart sounds.   Pulmonary:      Effort: Pulmonary effort is normal. No respiratory distress.      Breath sounds: Examination of the right-lower field reveals decreased breath sounds. Examination of the left-lower field reveals decreased breath sounds. Decreased breath sounds present. No wheezing.   Abdominal:      General: Bowel sounds are normal. There is no distension.      Palpations: Abdomen is soft.      Tenderness: There is no abdominal tenderness.   Musculoskeletal:         General: Normal range of motion.      Cervical back: Normal range of motion and neck supple.      Right lower le+ Edema present.      Left lower le+ Edema present.   Feet:      Right foot:      Skin integrity: Callus and dry skin present.      Left foot:      Skin integrity: Callus and dry skin present.   Lymphadenopathy:      Cervical: No cervical adenopathy.      Upper Body:      Right upper body: No supraclavicular adenopathy.      Left upper body: No supraclavicular adenopathy.   Skin:     General: Skin is warm and dry.      Capillary Refill: Capillary refill takes less than 2 seconds.      Findings: No rash.      Comments: LLE: erythema, chronic venous  stasis   Neurological:      Mental Status: He is alert and oriented to person, place, and time.   Psychiatric:         Mood and Affect: Mood is not anxious.         Speech: Speech normal.         Behavior: Behavior normal.         Thought Content: Thought content normal.         Judgment: Judgment normal.              CRANIAL NERVES     CN III, IV, VI   Pupils are equal, round, and reactive to light.       Significant Labs: All pertinent labs within the past 24 hours have been reviewed.  CBC:   Recent Labs   Lab 04/02/24  0817   WBC 4.29   HGB 14.7   HCT 44.7        CMP:   Recent Labs   Lab 04/02/24  0817      K 4.1      CO2 23      BUN 20   CREATININE 1.0   CALCIUM 9.2   PROT 7.5   ALBUMIN 3.2*   BILITOT 1.1*   ALKPHOS 59   AST 20   ALT 20   ANIONGAP 10     Cardiac Markers:   Recent Labs   Lab 04/02/24  0817   *     Troponin:   Recent Labs   Lab 04/02/24  0817   TROPONINI 0.064*       Significant Imaging: I have reviewed all pertinent imaging results/findings within the past 24 hours.

## 2024-04-02 NOTE — Clinical Note
The catheter was inserted into the ostium   right coronary artery. Hemodynamics were performed.  An angiography was performed of the right coronary arteries.

## 2024-04-02 NOTE — ASSESSMENT & PLAN NOTE
Reported chest pain began overnight. Although symptoms present for the past 2 weeks per patient. Seen in ED on 3/6/24, recommended admission, patient refused at that time.     --Cont diuresis  --care as above

## 2024-04-02 NOTE — ASSESSMENT & PLAN NOTE
Reports unable to lay flat for the past 2 weeks, having to sit in a chair and upright at night.     --Cont diuresis

## 2024-04-02 NOTE — Clinical Note
The catheter was inserted into the ostial  left coronary artery. An angiography was performed of the left coronary arteries. Multiple views were taken. Over J wire

## 2024-04-02 NOTE — ASSESSMENT & PLAN NOTE
Patient is identified as having Diastolic (HFpEF) heart failure that is Acute. CHF is currently uncontrolled due to Pulmonary edema/pleural effusion on CXR. Latest ECHO performed and demonstrates- No results found for this or any previous visit.  . Continue Furosemide and monitor clinical status closely. Monitor on telemetry. Patient is on CHF pathway.  Monitor strict Is&Os and daily weights.  Place on fluid restriction of 2 L. Cardiology has been consulted. Continue to stress to patient importance of self efficacy and  on diet for CHF. Last BNP reviewed- and noted below   Recent Labs   Lab 04/02/24  0817   *   Cw iv lasix

## 2024-04-02 NOTE — ASSESSMENT & PLAN NOTE
Chronic venous stasis. Worsened appearance of BLE, likely secondary to inability to elevate legs over the past 2 weeks due to orthopnea.     --Cont diuresis

## 2024-04-02 NOTE — H&P (VIEW-ONLY)
[facility]  Cardiology  Consult Note    Patient Name: Chucho Clark  MRN: [id number]  Admission Date: 4/2/2024  Hospital Length of Stay: 0 days  Code Status: Full Code   Attending Provider: Francy Bruno MD   Consulting Provider: Suzanna Hernandez MD  Primary Care Physician: Ale, Primary Doctor  Principal Problem:Elevated troponin    Patient information was obtained from patient, past medical records, and ER records.     Inpatient consult to Cardiology  Consult performed by: Suzanna Hernandez MD  Consult ordered by: Ted Augustin Jr., MD  Reason for consult: CHF. NSTEMI        Subjective:     Chief Complaint:  CHF. NSTEMI      HPI:   78 y.o. male patient, PMHx: Diastolic HF, HTN, DVT, CAD. Presented to the Emergency Department for evaluation of CP which onset night PTA. Pt visited Dr. Corrales (Cardiology) for the first time on 4/1/24 and was initiated on Eliquis. Pt notes that he also felt some upper abd pain PTA that felt like acid reflux. Pt and son originally thought sxs were cold/flu sxs, but tested negative in the clinic. Symptoms are constant and moderate in severity. No mitigating or exacerbating factors reported. Associated sxs include SOB, palpitations, and increased HR. Patient denies any cough, numbness, HA, fever, n/v/d, and all other sxs at this time. Pt has not had a stress test. In the ED, vitals: 130/69, 121, 22, 97.8F, 95% RA. Significant Labs: BNP: 560, Trop: 0.064, Bili: 1.1. CXR: interstitial edema. EKG: Neg for STEMI. Cardiology consulted in ED. Treated with ASA, Nitro, BB, diuretic. Initiated on Heparin Infusion. Patient is a full code. Placed in observation under the care of Hospital Medicine for management of elevated troponin, ACS rule out.        Cardiology consulted      Past Medical History:   Diagnosis Date    CHF (congestive heart failure)     DVT (deep venous thrombosis)     Hypertension     Renal disorder        History reviewed. No pertinent surgical  history.    Review of patient's allergies indicates:   Allergen Reactions    Vancomycin Hives     Patient given vancomycin on 7/7/2020 developed hives.  Vancomycin added to allergy list.       No current facility-administered medications on file prior to encounter.     Current Outpatient Medications on File Prior to Encounter   Medication Sig    apixaban (ELIQUIS) 5 mg Tab Take 1 tablet (5 mg total) by mouth 2 (two) times daily.    metoprolol succinate (TOPROL-XL) 25 MG 24 hr tablet Take 1 tablet (25 mg total) by mouth once daily.    rosuvastatin (CRESTOR) 10 MG tablet Take 1 tablet (10 mg total) by mouth once daily.    torsemide (DEMADEX) 20 MG Tab Take 1 tablet (20 mg total) by mouth 2 (two) times a day.     Family History       Problem Relation (Age of Onset)    Cancer Mother    Diabetes Mother          Tobacco Use    Smoking status: Former    Smokeless tobacco: Former   Substance and Sexual Activity    Alcohol use: Yes    Drug use: Never    Sexual activity: Not on file     Review of Systems   Cardiovascular:  Positive for chest pain and dyspnea on exertion. Negative for palpitations and syncope.   Genitourinary: Negative.    Neurological: Negative.      Objective:     Vital Signs (Most Recent):  Temp: 97.8 °F (36.6 °C) (04/02/24 0741)  Pulse: (!) 118 (04/02/24 1002)  Resp: (!) 22 (04/02/24 0741)  BP: 114/80 (04/02/24 1002)  SpO2: (!) 91 % (04/02/24 1002) Vital Signs (24h Range):  Temp:  [97.8 °F (36.6 °C)] 97.8 °F (36.6 °C)  Pulse:  [114-121] 118  Resp:  [22] 22  SpO2:  [91 %-97 %] 91 %  BP: (111-134)/(69-86) 114/80     Weight: 129.8 kg (286 lb 2.5 oz)  Body mass index is 39.91 kg/m².    SpO2: (!) 91 %         Intake/Output Summary (Last 24 hours) at 4/2/2024 1246  Last data filed at 4/2/2024 1006  Gross per 24 hour   Intake 250 ml   Output --   Net 250 ml       Lines/Drains/Airways       Peripheral Intravenous Line  Duration                  Peripheral IV - Single Lumen 04/02/24 0813 20 G Posterior;Right  Forearm <1 day         Peripheral IV - Single Lumen 04/02/24 1224 20 G Left;Posterior Hand <1 day                     Physical Exam  Vitals reviewed.   Constitutional:       Appearance: He is well-developed.   Neck:      Vascular: No carotid bruit.   Cardiovascular:      Rate and Rhythm: Normal rate and regular rhythm.      Pulses: Intact distal pulses.      Heart sounds: Normal heart sounds. No murmur heard.  Pulmonary:      Breath sounds: Rales present.   Neurological:      Mental Status: He is oriented to person, place, and time.          Significant Labs: All pertinent lab results from the last 24 hours have been reviewed. and   Recent Lab Results         04/02/24  0817        Albumin 3.2       ALP 59       ALT 20       Anion Gap 10       PTT 29.3  Comment: Refer to local heparin nomogram for intensity/dose specific   therapeutic   range.         AST 20       Baso # 0.02       Basophil % 0.5       BILIRUBIN TOTAL 1.1  Comment: For infants and newborns, interpretation of results should be based  on gestational age, weight and in agreement with clinical  observations.    Premature Infant recommended reference ranges:  Up to 24 hours.............<8.0 mg/dL  Up to 48 hours............<12.0 mg/dL  3-5 days..................<15.0 mg/dL  6-29 days.................<15.0 mg/dL           Comment: Values of less than 100 pg/ml are consistent with non-CHF populations.       BUN 20       Calcium 9.2       Chloride 107       CO2 23       Creatinine 1.0       Differential Method Automated       eGFR >60       Eos # 0.0       Eos % 0.7       Glucose 105       Gran # (ANC) 2.7       Gran % 63.7       Hematocrit 44.7       Hemoglobin 14.7       Immature Grans (Abs) 0.01  Comment: Mild elevation in immature granulocytes is non specific and   can be seen in a variety of conditions including stress response,   acute inflammation, trauma and pregnancy. Correlation with other   laboratory and clinical findings is essential.          Immature Granulocytes 0.2       INR 1.3  Comment: Coumadin Therapy:  2.0 - 3.0 for INR for all indicators except mechanical heart valves  and antiphospholipid syndromes which should use 2.5 - 3.5.         Lymph # 1.0       Lymph % 24.2       MCH 32.2       MCHC 32.9       MCV 98       Mono # 0.5       Mono % 10.7       MPV 9.4       nRBC 0       Platelet Count 162       Potassium 4.1       PROTEIN TOTAL 7.5       PT 15.0       RBC 4.57       RDW 13.4       Sodium 140       Troponin I 0.064  Comment: The reference interval for Troponin I represents the 99th percentile   cutoff   for our facility and is consistent with 3rd generation assay   performance.         WBC 4.29               Significant Imaging: Echocardiogram: pending    Assessment and Plan:     Chest pain  Heparin iv   Trend trop   Echo   Aspirin   Will need ischemic work up prior to discharge  Cw toprol    Acute on chronic congestive heart failure  Patient is identified as having Diastolic (HFpEF) heart failure that is Acute. CHF is currently uncontrolled due to Pulmonary edema/pleural effusion on CXR. Latest ECHO performed and demonstrates- No results found for this or any previous visit.  . Continue Furosemide and monitor clinical status closely. Monitor on telemetry. Patient is on CHF pathway.  Monitor strict Is&Os and daily weights.  Place on fluid restriction of 2 L. Cardiology has been consulted. Continue to stress to patient importance of self efficacy and  on diet for CHF. Last BNP reviewed- and noted below   Recent Labs   Lab 04/02/24  0817   *   Cw iv lasix         VTE Risk Mitigation (From admission, onward)           Ordered     heparin 25,000 units in dextrose 5% (100 units/ml) IV bolus from bag LOW INTENSITY nomogram - [facility]  As needed (PRN)        Question:  Heparin Infusion Adjustment (DO NOT MODIFY ANSWER)  Answer:  [email or web address]\Images\Pharmacy\HeparinInfusions\heparin LOW INTENSITY nomogram for [facility] [id number]     04/02/24 1109     heparin 25,000 units in dextrose 5% (100 units/ml) IV bolus from bag LOW INTENSITY nomogram - [facility]  As needed (PRN)        Question:  Heparin Infusion Adjustment (DO NOT MODIFY ANSWER)  Answer:  [email or web address]\Images\Pharmacy\HeparinInfusions\heparin LOW INTENSITY nomogram for [facility] [id number]    04/02/24 1109     Reason for No Pharmacological VTE Prophylaxis  Once        Comments: Heparin Infusion   Question:  Reasons:  Answer:  Physician Provided (leave comment)    04/02/24 1153     IP VTE HIGH RISK PATIENT  Once         04/02/24 1153     Place sequential compression device  Until discontinued         04/02/24 1153     heparin 25,000 units in dextrose 5% 250 mL (100 units/mL) infusion LOW INTENSITY nomogram - [facility]  Continuous        Question:  Begin at (units/kg/hr)  Answer:  12    04/02/24 1109                    Thank you for your consult. I will follow-up with patient. Please contact us if you have any additional questions.    Suzanna Hernandez MD  Cardiology   [facility] - Emergency Dept.

## 2024-04-02 NOTE — Clinical Note
The catheter was inserted into the abdominal aorta. Hemodynamics were performed.  An angiography was performed of the abdominal aorta. The angiography was performed via power injection. The injected amount was 15 mL contrast at 10 mL/s.

## 2024-04-02 NOTE — HPI
78 y.o. male patient, PMHx: Diastolic HF, HTN, DVT, CAD. Presented to the Emergency Department for evaluation of CP which onset night PTA. Pt visited Dr. Corrales (Cardiology) for the first time on 4/1/24 and was initiated on Eliquis. Pt notes that he also felt some upper abd pain PTA that felt like acid reflux. Pt and son originally thought sxs were cold/flu sxs, but tested negative in the clinic. Symptoms are constant and moderate in severity. No mitigating or exacerbating factors reported. Associated sxs include SOB, palpitations, and increased HR. Patient denies any cough, numbness, HA, fever, n/v/d, and all other sxs at this time. Pt has not had a stress test. In the ED, vitals: 130/69, 121, 22, 97.8F, 95% RA. Significant Labs: BNP: 560, Trop: 0.064, Bili: 1.1. CXR: interstitial edema. EKG: Neg for STEMI. Cardiology consulted in ED. Treated with ASA, Nitro, BB, diuretic. Initiated on Heparin Infusion. Patient is a full code. Placed in observation under the care of Hospital Medicine for management of elevated troponin, ACS rule out.        Cardiology consulted

## 2024-04-02 NOTE — Clinical Note
The left DP pulse was 1+. The right DP pulse was 2+.  The PT pulses were 1+ bilaterally. The radial pulses were +2 bilaterally.

## 2024-04-02 NOTE — ED PROVIDER NOTES
SCRIBE #1 NOTE: I, Pavan Jennings, am scribing for, and in the presence of, Ted Augustin Jr., MD. I have scribed the entire note.         History     Chief Complaint   Patient presents with    Chest Pain     Seen by cardiology yesterday started on new medications pt reports pain across chest and increased sob last night and this AM     Review of patient's allergies indicates:   Allergen Reactions    Vancomycin Hives     Patient given vancomycin on 7/7/2020 developed hives.  Vancomycin added to allergy list.         History of Present Illness     HPI    4/2/2024, 8:14 AM  History obtained from the patient and patient's son      History of Present Illness: Chucho Clark is a 78 y.o. male patient with a recent Dx of AFIB who presents to the Emergency Department for evaluation of CP which onset last night. Pt visited Dr. Corrales (Cardiology) for the first time yesterday and started Eliquis for the first time this morning after receiving prescription yesterday. Pt notes that he also felt some upper abd pain last night that felt like acid reflux. Pt and son originally thought sxs were cold/flu sxs, but tested negative in the clinic. Symptoms are constant and moderate in severity. No mitigating or exacerbating factors reported. Associated sxs include SOB, palpitations, and increased HR. Patient denies any cough, numbness, HA, fever, n/v/d, and all other sxs at this time. Pt has not had a stress test. No further complaints or concerns at this time.       Arrival mode: Personal vehicle    PCP: No, Primary Doctor        Past Medical History:  Past Medical History:   Diagnosis Date    CHF (congestive heart failure)     DVT (deep venous thrombosis)     Hypertension     Renal disorder        Past Surgical History:  History reviewed. No pertinent surgical history.      Family History:  Family History   Problem Relation Age of Onset    Diabetes Mother     Cancer Mother        Social History:  Social History     Tobacco Use     Smoking status: Former    Smokeless tobacco: Former   Substance and Sexual Activity    Alcohol use: Yes    Drug use: Never    Sexual activity: Not on file        Review of Systems     Review of Systems   Constitutional:  Negative for chills and fever.   HENT:  Negative for congestion and sore throat.    Respiratory:  Positive for shortness of breath. Negative for cough.    Cardiovascular:  Positive for chest pain and palpitations.        (+) increased HR   Gastrointestinal:  Positive for abdominal pain. Negative for diarrhea, nausea and vomiting.   Genitourinary:  Negative for dysuria.   Musculoskeletal:  Negative for back pain.   Skin:  Negative for rash.   Neurological:  Negative for dizziness, weakness, numbness and headaches.   Hematological:  Does not bruise/bleed easily.   All other systems reviewed and are negative.       Physical Exam     Initial Vitals [04/02/24 0741]   BP Pulse Resp Temp SpO2   130/69 (!) 121 (!) 22 97.8 °F (36.6 °C) 95 %      MAP       --          Physical Exam  Nursing Notes and Vital Signs Reviewed.  Constitutional: Patient is in no acute distress. Well-developed and well-nourished.  Head: Atraumatic. Normocephalic.  Eyes: PERRL. EOM intact. Conjunctivae are not pale. No scleral icterus.  ENT: Mucous membranes are moist. Oropharynx is clear and symmetric.    Neck: Supple. Full ROM. No lymphadenopathy.  Cardiovascular: Tachycardia. Heart score of 6. Regular rhythm. No murmurs, rubs, or gallops. Distal pulses are 2+ and symmetric.  Pulmonary/Chest: No respiratory distress. Clear to auscultation bilaterally. No wheezing or rales.  Abdominal: Soft and non-distended.  There is no tenderness.  No rebound, guarding, or rigidity. Good bowel sounds.  Genitourinary: No CVA tenderness  Musculoskeletal: Moves all extremities. No obvious deformities. Pitting edema to BLE. Stasis dermatitis of LLE. No calf tenderness.  Skin: Warm and dry.  Neurological:  Alert, awake, and appropriate.  Normal speech.   No acute focal neurological deficits are appreciated.  Psychiatric: Normal affect. Good eye contact. Appropriate in content.     ED Course   Critical Care    Date/Time: 4/2/2024 11:35 AM    Performed by: Ted Augustin Jr., MD  Authorized by: Ted Augustin Jr., MD  Direct patient critical care time: 20 minutes  Additional history critical care time: 18 minutes  Ordering / reviewing critical care time: 13 minutes  Documentation critical care time: 12 minutes  Consulting other physicians critical care time: 12 minutes  Total critical care time (exclusive of procedural time) : 75 minutes  Critical care time was exclusive of separately billable procedures and treating other patients and teaching time.  Critical care was necessary to treat or prevent imminent or life-threatening deterioration of the following conditions: cardiac failure (Elevated Troponin).  Critical care was time spent personally by me on the following activities: blood draw for specimens, development of treatment plan with patient or surrogate, interpretation of cardiac output measurements, evaluation of patient's response to treatment, examination of patient, obtaining history from patient or surrogate, ordering and performing treatments and interventions, ordering and review of laboratory studies, ordering and review of radiographic studies, pulse oximetry, re-evaluation of patient's condition, review of old charts and discussions with consultants.        ED Vital Signs:  Vitals:    04/04/24 0421 04/04/24 0824 04/04/24 0857 04/04/24 1131   BP: 108/62 105/66  100/63   Pulse: 86 102 104 (!) 112   Resp: 18 17  20   Temp: 97.9 °F (36.6 °C) 97.7 °F (36.5 °C)  97.5 °F (36.4 °C)   TempSrc: Oral Oral  Oral   SpO2: (!) 92% (!) 93%  95%   Weight:       Height:        04/04/24 1500 04/04/24 1510 04/04/24 1520 04/04/24 1530   BP: (!) 84/46 (!) 85/56 90/60 93/61   Pulse: 80 81 81 80   Resp: 20 20 20 20   Temp: 97.5 °F (36.4 °C)      TempSrc: Oral      SpO2: 96%  97% 98% 99%   Weight:       Height:        04/04/24 1540 04/04/24 1550 04/04/24 1600 04/04/24 1610   BP: 91/61 (!) 95/57 95/64 (!) 96/59   Pulse: 81 80 79 80   Resp: 20 20 (!) 21 20   Temp:       TempSrc:       SpO2: 97% 99% 99% 98%   Weight:       Height:        04/04/24 1620 04/04/24 1630 04/04/24 1652   BP: (!) 97/58 (!) 95/57 (!) 105/55   Pulse: 80 80 83   Resp: 20 20 17   Temp:      TempSrc:      SpO2: 99% 99% (!) 93%   Weight:      Height:          Abnormal Lab Results:  Labs Reviewed   CBC W/ AUTO DIFFERENTIAL - Abnormal; Notable for the following components:       Result Value    RBC 4.57 (*)     MCH 32.2 (*)     All other components within normal limits   COMPREHENSIVE METABOLIC PANEL - Abnormal; Notable for the following components:    Albumin 3.2 (*)     Total Bilirubin 1.1 (*)     All other components within normal limits   TROPONIN I - Abnormal; Notable for the following components:    Troponin I 0.064 (*)     All other components within normal limits   B-TYPE NATRIURETIC PEPTIDE - Abnormal; Notable for the following components:     (*)     All other components within normal limits   TROPONIN I - Abnormal; Notable for the following components:    Troponin I 0.054 (*)     All other components within normal limits   PROTIME-INR - Abnormal; Notable for the following components:    Prothrombin Time 15.0 (*)     INR 1.3 (*)     All other components within normal limits   APTT - Abnormal; Notable for the following components:    aPTT 50.7 (*)     All other components within normal limits   APTT   PROTIME-INR   APTT   TSH   HEMOGLOBIN A1C   TSH   TROPONIN I        All Lab Results:  Results for orders placed or performed during the hospital encounter of 04/02/24   CBC auto differential   Result Value Ref Range    WBC 4.29 3.90 - 12.70 K/uL    RBC 4.57 (L) 4.60 - 6.20 M/uL    Hemoglobin 14.7 14.0 - 18.0 g/dL    Hematocrit 44.7 40.0 - 54.0 %    MCV 98 82 - 98 fL    MCH 32.2 (H) 27.0 - 31.0 pg    MCHC 32.9  32.0 - 36.0 g/dL    RDW 13.4 11.5 - 14.5 %    Platelets 162 150 - 450 K/uL    MPV 9.4 9.2 - 12.9 fL    Immature Granulocytes 0.2 0.0 - 0.5 %    Gran # (ANC) 2.7 1.8 - 7.7 K/uL    Immature Grans (Abs) 0.01 0.00 - 0.04 K/uL    Lymph # 1.0 1.0 - 4.8 K/uL    Mono # 0.5 0.3 - 1.0 K/uL    Eos # 0.0 0.0 - 0.5 K/uL    Baso # 0.02 0.00 - 0.20 K/uL    nRBC 0 0 /100 WBC    Gran % 63.7 38.0 - 73.0 %    Lymph % 24.2 18.0 - 48.0 %    Mono % 10.7 4.0 - 15.0 %    Eosinophil % 0.7 0.0 - 8.0 %    Basophil % 0.5 0.0 - 1.9 %    Differential Method Automated    Comprehensive metabolic panel   Result Value Ref Range    Sodium 140 136 - 145 mmol/L    Potassium 4.1 3.5 - 5.1 mmol/L    Chloride 107 95 - 110 mmol/L    CO2 23 23 - 29 mmol/L    Glucose 105 70 - 110 mg/dL    BUN 20 8 - 23 mg/dL    Creatinine 1.0 0.5 - 1.4 mg/dL    Calcium 9.2 8.7 - 10.5 mg/dL    Total Protein 7.5 6.0 - 8.4 g/dL    Albumin 3.2 (L) 3.5 - 5.2 g/dL    Total Bilirubin 1.1 (H) 0.1 - 1.0 mg/dL    Alkaline Phosphatase 59 55 - 135 U/L    AST 20 10 - 40 U/L    ALT 20 10 - 44 U/L    eGFR >60 >60 mL/min/1.73 m^2    Anion Gap 10 8 - 16 mmol/L   Troponin I #1   Result Value Ref Range    Troponin I 0.064 (H) 0.000 - 0.026 ng/mL   BNP   Result Value Ref Range     (H) 0 - 99 pg/mL   Troponin I #2   Result Value Ref Range    Troponin I 0.054 (H) 0.000 - 0.026 ng/mL   APTT   Result Value Ref Range    aPTT 29.3 21.0 - 32.0 sec   Protime-INR   Result Value Ref Range    Prothrombin Time 15.0 (H) 9.0 - 12.5 sec    INR 1.3 (H) 0.8 - 1.2   Hemoglobin A1C   Result Value Ref Range    Hemoglobin A1C 5.2 4.0 - 5.6 %    Estimated Avg Glucose 103 68 - 131 mg/dL   TSH   Result Value Ref Range    TSH 0.654 0.400 - 4.000 uIU/mL   Troponin I   Result Value Ref Range    Troponin I 0.064 (H) 0.000 - 0.026 ng/mL   APTT   Result Value Ref Range    aPTT 50.7 (H) 21.0 - 32.0 sec   APTT   Result Value Ref Range    aPTT 47.7 (H) 21.0 - 32.0 sec   APTT   Result Value Ref Range    aPTT 44.6 (H)  21.0 - 32.0 sec   CBC auto differential   Result Value Ref Range    WBC 4.24 3.90 - 12.70 K/uL    RBC 4.40 (L) 4.60 - 6.20 M/uL    Hemoglobin 14.1 14.0 - 18.0 g/dL    Hematocrit 43.0 40.0 - 54.0 %    MCV 98 82 - 98 fL    MCH 32.0 (H) 27.0 - 31.0 pg    MCHC 32.8 32.0 - 36.0 g/dL    RDW 13.5 11.5 - 14.5 %    Platelets 177 150 - 450 K/uL    MPV 10.6 9.2 - 12.9 fL    Immature Granulocytes 0.2 0.0 - 0.5 %    Gran # (ANC) 2.4 1.8 - 7.7 K/uL    Immature Grans (Abs) 0.01 0.00 - 0.04 K/uL    Lymph # 1.3 1.0 - 4.8 K/uL    Mono # 0.5 0.3 - 1.0 K/uL    Eos # 0.1 0.0 - 0.5 K/uL    Baso # 0.03 0.00 - 0.20 K/uL    nRBC 0 0 /100 WBC    Gran % 56.2 38.0 - 73.0 %    Lymph % 31.1 18.0 - 48.0 %    Mono % 10.6 4.0 - 15.0 %    Eosinophil % 1.2 0.0 - 8.0 %    Basophil % 0.7 0.0 - 1.9 %    Differential Method Automated    Magnesium   Result Value Ref Range    Magnesium 1.8 1.6 - 2.6 mg/dL   Phosphorus   Result Value Ref Range    Phosphorus 3.8 2.7 - 4.5 mg/dL   Comprehensive Metabolic Panel (CMP)   Result Value Ref Range    Sodium 140 136 - 145 mmol/L    Potassium 3.6 3.5 - 5.1 mmol/L    Chloride 102 95 - 110 mmol/L    CO2 29 23 - 29 mmol/L    Glucose 88 70 - 110 mg/dL    BUN 20 8 - 23 mg/dL    Creatinine 1.0 0.5 - 1.4 mg/dL    Calcium 9.4 8.7 - 10.5 mg/dL    Total Protein 6.9 6.0 - 8.4 g/dL    Albumin 3.2 (L) 3.5 - 5.2 g/dL    Total Bilirubin 1.2 (H) 0.1 - 1.0 mg/dL    Alkaline Phosphatase 61 55 - 135 U/L    AST 16 10 - 40 U/L    ALT 15 10 - 44 U/L    eGFR >60 >60 mL/min/1.73 m^2    Anion Gap 9 8 - 16 mmol/L   APTT   Result Value Ref Range    aPTT 40.3 (H) 21.0 - 32.0 sec   CBC auto differential   Result Value Ref Range    WBC 4.28 3.90 - 12.70 K/uL    RBC 4.31 (L) 4.60 - 6.20 M/uL    Hemoglobin 13.8 (L) 14.0 - 18.0 g/dL    Hematocrit 41.8 40.0 - 54.0 %    MCV 97 82 - 98 fL    MCH 32.0 (H) 27.0 - 31.0 pg    MCHC 33.0 32.0 - 36.0 g/dL    RDW 13.4 11.5 - 14.5 %    Platelets 179 150 - 450 K/uL    MPV 10.7 9.2 - 12.9 fL    Immature  Granulocytes 0.2 0.0 - 0.5 %    Gran # (ANC) 2.5 1.8 - 7.7 K/uL    Immature Grans (Abs) 0.01 0.00 - 0.04 K/uL    Lymph # 1.2 1.0 - 4.8 K/uL    Mono # 0.5 0.3 - 1.0 K/uL    Eos # 0.1 0.0 - 0.5 K/uL    Baso # 0.02 0.00 - 0.20 K/uL    nRBC 0 0 /100 WBC    Gran % 58.5 38.0 - 73.0 %    Lymph % 27.1 18.0 - 48.0 %    Mono % 12.1 4.0 - 15.0 %    Eosinophil % 1.6 0.0 - 8.0 %    Basophil % 0.5 0.0 - 1.9 %    Differential Method Automated    Magnesium   Result Value Ref Range    Magnesium 1.8 1.6 - 2.6 mg/dL   Phosphorus   Result Value Ref Range    Phosphorus 4.2 2.7 - 4.5 mg/dL   Comprehensive Metabolic Panel (CMP)   Result Value Ref Range    Sodium 140 136 - 145 mmol/L    Potassium 3.2 (L) 3.5 - 5.1 mmol/L    Chloride 97 95 - 110 mmol/L    CO2 32 (H) 23 - 29 mmol/L    Glucose 88 70 - 110 mg/dL    BUN 29 (H) 8 - 23 mg/dL    Creatinine 1.4 0.5 - 1.4 mg/dL    Calcium 9.3 8.7 - 10.5 mg/dL    Total Protein 6.7 6.0 - 8.4 g/dL    Albumin 3.2 (L) 3.5 - 5.2 g/dL    Total Bilirubin 0.9 0.1 - 1.0 mg/dL    Alkaline Phosphatase 58 55 - 135 U/L    AST 17 10 - 40 U/L    ALT 15 10 - 44 U/L    eGFR 51 (A) >60 mL/min/1.73 m^2    Anion Gap 11 8 - 16 mmol/L   Basic metabolic panel   Result Value Ref Range    Sodium 136 136 - 145 mmol/L    Potassium 3.8 3.5 - 5.1 mmol/L    Chloride 100 95 - 110 mmol/L    CO2 26 23 - 29 mmol/L    Glucose 91 70 - 110 mg/dL    BUN 23 8 - 23 mg/dL    Creatinine 1.2 0.5 - 1.4 mg/dL    Calcium 8.7 8.7 - 10.5 mg/dL    Anion Gap 10 8 - 16 mmol/L    eGFR >60 >60 mL/min/1.73 m^2   EKG 12-lead   Result Value Ref Range    QRS Duration 142 ms    [facility] QTC Calculation 510 ms   EKG 12-lead   Result Value Ref Range    QRS Duration 142 ms    [facility] QTC Calculation 464 ms   Echo Saline Bubble? No   Result Value Ref Range    BSA 2.55 m2    LVOT stroke volume 47.18 cm3    LVIDd 5.79 3.5 - 6.0 cm    LV Systolic Volume 112.00 mL    LV Systolic Volume Index 45.5 mL/m2    LVIDs 4.88 (A) 2.1 - 4.0 cm    LV Diastolic Volume 165.91 mL    LV  Diastolic Volume Index 67.44 mL/m2    IVS 1.29 (A) 0.6 - 1.1 cm    LVOT diameter 2.11 cm    LVOT area 3.5 cm2    FS 16 (A) 28 - 44 %    Left Ventricle Relative Wall Thickness 0.35 cm    Posterior Wall 1.02 0.6 - 1.1 cm    LV mass 281.26 g    LV Mass Index 114 g/m2    MV Peak E Guru 1.27 m/s    TDI LATERAL 0.11 m/s    TDI SEPTAL 0.09 m/s    E/E' ratio 12.70 m/s    MV Peak A Guru 1.01 m/s    TR Max Guru 2.74 m/s    E/A ratio 1.26     IVRT 53.28 msec    E wave deceleration time 102.45 msec    LV SEPTAL E/E' RATIO 14.11 m/s    LV LATERAL E/E' RATIO 11.55 m/s    LVOT peak guru 0.88 m/s    Left Ventricular Outflow Tract Mean Velocity 0.76 cm/s    Left Ventricular Outflow Tract Mean Gradient 2.34 mmHg    RVDD 3.32 cm    RVOT peak VTI 7.4 cm    TAPSE 1.17 cm    LA size 4.81 cm    Left Atrium Minor Axis 7.08 cm    Left Atrium Major Axis 7.13 cm    RA Major Axis 6.17 cm    AV regurgitation pressure 1/2 time [id number] ms    AR Max Guru 2.74 m/s    AV mean gradient 5 mmHg    AV peak gradient 8 mmHg    Ao peak guru 1.43 m/s    Ao VTI 19.90 cm    LVOT peak VTI 13.50 cm    AV valve area 2.37 cm²    AV Velocity Ratio 0.62     AV index (prosthetic) 0.68     ZENIA by Velocity Ratio 2.15 cm²    Mr max guru 6.01 m/s    MV stenosis pressure 1/2 time 29.71 ms    MV valve area p 1/2 method 7.40 cm2    TV mean gradient 28 mmHg    Triscuspid Valve Regurgitation Peak Gradient 30 mmHg    PV mean gradient 1 mmHg    RVOT peak guru 0.57 m/s    Ao root annulus 3.73 cm    STJ 3.46 cm    Ascending aorta 3.42 cm    IVC diameter 2.33 cm    Mean e' 0.10 m/s    ZLVIDS -3.39     ZLVIDD -7.76     LA Volume Index 59.0 mL/m2    LA volume 145.24 cm3    LA WIDTH 5.0 cm    RA Width 3.7 cm    TV resting pulmonary artery pressure 45 mmHg    RV TB RVSP 18 mmHg    Est. RA pres 15 mmHg   Transesophageal echo (RODERICK) with possible cardioversion   Result Value Ref Range    BSA 2.55 m2    Mr max guru 4.74 m/s    Est. RA pres 8 mmHg   Cardiac catheterization   Result  Value Ref Range    Cath EF Quantitative 30 %   POCT glucose   Result Value Ref Range    POCT Glucose 107 70 - 110 mg/dL   ISTAT PROCEDURE   Result Value Ref Range    POC PH 7.418 7.35 - 7.45    POC PCO2 50.3 mmHg    POC PO2 34 (L) 80 - 100 mmHg    POC HCO3 32.5 (H) 24 - 28 mmol/L    POC BE 8 (H) -2 to 2 mmol/L    POC SATURATED O2 65 %    Sample PULMONARY     Site Malik/UAC     Allens Test N/A     DelSys Room Air     Mode SPONT          Imaging Results:  Imaging Results              X-Ray Chest AP Portable (Final result)  Result time 04/02/24 08:44:48      Final result by Wilder Bennett MD (04/02/24 08:44:48)                   Impression:      Perihilar interstitial thickening and lower lobe interstitial opacities could reflect pulmonary vascular congestion and mild interstitial edema.      Electronically signed by: Wilder Bennett MD  Date:    04/02/2024  Time:    08:44               Narrative:    EXAMINATION:  XR CHEST AP PORTABLE    CLINICAL HISTORY:  Chest Pain;    FINDINGS:  Single view of the chest.  Comparison 03/06/2024    Cardiac silhouette is enlarged but stable.  No mio consolidation.  Perihilar interstitial thickening and lower lobe interstitial opacities could reflect pulmonary vascular congestion and mild interstitial edema.  Suspect trace effusions.  No pneumothorax.  Bones appear intact.  Moderate degenerative changes and moderate atherosclerotic disease.                                       The EKG was ordered, reviewed, and independently interpreted by the ED provider.  Interpretation time: 07:34  Rate: 121 BPM  Rhythm: sinus tachycardia  Interpretation: Sinus Tachycardia with occasional PVC  Nonspecific intraventricular block  Nonspecific  T wave abnormality. No STEMI.             The Emergency Provider reviewed the vital signs and test results, which are outlined above.     ED Discussion     11:08 AM: Spoke with Dr. Hernandez (Cardiology) who recommends Heparin, Lasix, ECHO, Nitro patch, and  NPO after midnight. Recommends trend troponin as well.        11:34 AM: Discussed case with Dr. Bruno (Hospital Medicine). Dr. Bruno agrees with current care and management of pt and accepts admission.   Admitting Service:   Admitting Physician: Dr. Bruno   Admit to: [facility] Med Tele      11:34 AM: Re-evaluated pt. I have discussed test results, shared treatment plan, and the need for admission with patient and family at bedside. Pt and family express understanding at this time and agree with all information. All questions answered. Pt and family have no further questions or concerns at this time. Pt is ready for admit.        Medical Decision Making  Amount and/or Complexity of Data Reviewed  Labs: ordered. Decision-making details documented in ED Course.  Radiology: ordered and independent interpretation performed. Decision-making details documented in ED Course.  ECG/medicine tests: ordered and independent interpretation performed. Decision-making details documented in ED Course.    Risk  OTC drugs.  Prescription drug management.  Parenteral controlled substances.  Decision regarding hospitalization.  Risk Details: OTC drugs, prescription drugs and controlled substances considered.  Due to patient's symptoms improving and pain controlled pain medications ordered appropriately.  DDX: MI, CAD, PUlmonary disease, PE, AAA, Pneumonia, Costochondritis, PTX, Liver disease, Pancreatitis         Additional MDM:   Heart Score:    History:          Slightly suspicious.  ECG:             Nonspecific repolarisation disturbance  Age:               >65 years  Risk factors: 1-2 risk factors  Troponin:       >2x normal limit  Heart Score = 6                ED Medication(s):  Medications   aspirin chewable tablet 162 mg (162 mg Oral Not Given 4/2/24 4184)   heparin 25,000 units in dextrose 5% 250 mL (100 units/mL) infusion LOW INTENSITY nomogram - [facility] (12 Units/kg/hr × 97.1 kg (Adjusted) Intravenous New Bag 4/4/24 0708)    heparin 25,000 units in dextrose 5% (100 units/ml) IV bolus from bag LOW INTENSITY nomogram - [facility] (has no administration in time range)   heparin 25,000 units in dextrose 5% (100 units/ml) IV bolus from bag LOW INTENSITY nomogram - [facility] (has no administration in time range)   sodium chloride 0.9% flush 10 mL (has no administration in time range)   melatonin tablet 6 mg (has no administration in time range)   ondansetron injection 4 mg (has no administration in time range)   bisacodyL suppository 10 mg (has no administration in time range)   aluminum-magnesium hydroxide-simethicone 200-200-20 mg/5 mL suspension 30 mL (has no administration in time range)   acetaminophen tablet 650 mg (has no administration in time range)   morphine injection 2 mg (has no administration in time range)   naloxone 0.4 mg/mL injection 0.02 mg (has no administration in time range)   glucose chewable tablet 16 g (has no administration in time range)   glucose chewable tablet 24 g (has no administration in time range)   glucagon (human recombinant) injection 1 mg (has no administration in time range)   dextrose 10% bolus 125 mL 125 mL (has no administration in time range)   dextrose 10% bolus 250 mL 250 mL (has no administration in time range)   sodium chloride 0.9% flush 10 mL (has no administration in time range)   metoprolol succinate (TOPROL-XL) 24 hr tablet 25 mg (25 mg Oral Given 4/4/24 0925)   atorvastatin tablet 40 mg (40 mg Oral Given 4/4/24 0925)   aspirin EC tablet 81 mg (81 mg Oral Given 4/4/24 0925)   amiodarone 360 mg/200 mL (1.8 mg/mL) infusion (0 mg/min Intravenous Stopped 4/3/24 1421)   amiodarone 360 mg/200 mL (1.8 mg/mL) infusion (0.5 mg/min Intravenous New Bag 4/4/24 0709)   0.9%  NaCl infusion ( Intravenous New Bag 4/4/24 1025)   ondansetron disintegrating tablet 8 mg (has no administration in time range)   0.9%  NaCl infusion ( Intravenous Not Given 4/4/24 1545)   sodium chloride 0.9% bolus 500 mL 500 mL (0 mLs  Intravenous Stopped 4/2/24 1006)   aspirin chewable tablet 162 mg (162 mg Oral Given 4/2/24 0831)   metoprolol injection 5 mg (5 mg Intravenous Given 4/2/24 0926)   furosemide injection 40 mg (40 mg Intravenous Given 4/2/24 1009)   heparin 25,000 units in dextrose 5% (100 units/ml) IV bolus from bag LOW INTENSITY nomogram - [facility] (4,000 Units Intravenous Bolus from Bag 4/2/24 1135)   nitroGLYCERIN 2% TD oint ointment 1 inch (1 inch Topical (Top) Given 4/2/24 1136)   amiodarone in dextrose 150 mg/100 mL (1.5 mg/mL) loading dose 150 mg (0 mg Intravenous Stopped 4/3/24 1432)   potassium chloride SA CR tablet 40 mEq (40 mEq Oral Given 4/4/24 0925)       Current Discharge Medication List                  Scribe Attestation:   Scribe #1: I performed the above scribed service and the documentation accurately describes the services I performed. I attest to the accuracy of the note.     Attending:   Physician Attestation Statement for Scribe #1: I, Ted Augustin Jr., MD, personally performed the services described in this documentation, as scribed by Pavan Jennings, in my presence, and it is both accurate and complete.           Clinical Impression       ICD-10-CM ICD-9-CM   1. Elevated troponin  R79.89 790.6   2. Chest pain  R07.9 786.50   3. Acute on chronic congestive heart failure, unspecified heart failure type  I50.9 428.0   4. Tachycardia  R00.0 785.0   5. Chest pain, unspecified type  R07.9 786.50   6. A-fib  I48.91 427.31   7. Atrial flutter, unspecified type  I48.92 427.32   8. Coronary artery disease involving native coronary artery of native heart without angina pectoris  I25.10 414.01       Disposition:   Disposition: Placed in Observation  Condition: Stable         Ted Augustin Jr., MD  04/04/24 0005

## 2024-04-02 NOTE — H&P
[facility] - Emergency Dept.  [facility] Medicine  History & Physical    Patient Name: Chucho Clark  MRN: [id number]  Patient Class: OP- Observation  Admission Date: 4/2/2024  Attending Physician: Francy Bruno MD   Primary Care Provider: Ale, Primary Doctor         Patient information was obtained from patient, relative(s), past medical records, and ER records.     Subjective:     Principal Problem:Elevated troponin    Chief Complaint:   Chief Complaint   Patient presents with    Chest Pain     Seen by cardiology yesterday started on new medications pt reports pain across chest and increased sob last night and this AM        HPI: 78 y.o. male patient, PMHx: Diastolic HF, HTN, DVT, CAD. Presented to the Emergency Department for evaluation of CP which onset night PTA. Pt visited Dr. Corrales (Cardiology) for the first time on 4/1/24 and was initiated on Eliquis. Pt notes that he also felt some upper abd pain PTA that felt like acid reflux. Pt and son originally thought sxs were cold/flu sxs, but tested negative in the clinic. Symptoms are constant and moderate in severity. No mitigating or exacerbating factors reported. Associated sxs include SOB, palpitations, and increased HR. Patient denies any cough, numbness, HA, fever, n/v/d, and all other sxs at this time. Pt has not had a stress test. In the ED, vitals: 130/69, 121, 22, 97.8F, 95% RA. Significant Labs: BNP: 560, Trop: 0.064, Bili: 1.1. CXR: interstitial edema. EKG: Neg for STEMI. Cardiology consulted in ED. Treated with ASA, Nitro, BB, diuretic. Initiated on Heparin Infusion. Patient is a full code. Placed in observation under the care of Hospital Medicine for management of elevated troponin, ACS rule out.     Past Medical History:   Diagnosis Date    CHF (congestive heart failure)     DVT (deep venous thrombosis)     Hypertension     Renal disorder        History reviewed. No pertinent surgical history.    Review of patient's allergies indicates:   Allergen  Reactions    Vancomycin Hives     Patient given vancomycin on 7/7/2020 developed hives.  Vancomycin added to allergy list.       No current facility-administered medications on file prior to encounter.     Current Outpatient Medications on File Prior to Encounter   Medication Sig    apixaban (ELIQUIS) 5 mg Tab Take 1 tablet (5 mg total) by mouth 2 (two) times daily.    metoprolol succinate (TOPROL-XL) 25 MG 24 hr tablet Take 1 tablet (25 mg total) by mouth once daily.    rosuvastatin (CRESTOR) 10 MG tablet Take 1 tablet (10 mg total) by mouth once daily.    torsemide (DEMADEX) 20 MG Tab Take 1 tablet (20 mg total) by mouth 2 (two) times a day.     Family History       Problem Relation (Age of Onset)    Cancer Mother    Diabetes Mother          Tobacco Use    Smoking status: Former    Smokeless tobacco: Former   Substance and Sexual Activity    Alcohol use: Yes    Drug use: Never    Sexual activity: Not on file     Review of Systems   Respiratory:  Positive for chest tightness and shortness of breath.    Cardiovascular:  Positive for chest pain and leg swelling.   Skin:  Positive for color change and wound.   All other systems reviewed and are negative.    Objective:     Vital Signs (Most Recent):  Temp: 97.8 °F (36.6 °C) (04/02/24 0741)  Pulse: (!) 118 (04/02/24 1002)  Resp: (!) 22 (04/02/24 0741)  BP: 114/80 (04/02/24 1002)  SpO2: (!) 91 % (04/02/24 1002) Vital Signs (24h Range):  Temp:  [97.8 °F (36.6 °C)] 97.8 °F (36.6 °C)  Pulse:  [114-121] 118  Resp:  [22] 22  SpO2:  [91 %-97 %] 91 %  BP: (111-134)/(69-86) 114/80     Weight: 129.8 kg (286 lb 2.5 oz)  Body mass index is 39.91 kg/m².     Physical Exam  Vitals and nursing note reviewed.   Constitutional:       General: He is not in acute distress.     Appearance: He is well-developed. He is not diaphoretic.   HENT:      Head: Normocephalic and atraumatic.      Right Ear: Hearing and external ear normal.      Left Ear: Hearing and external ear normal.      Nose:  Nose normal. No mucosal edema or rhinorrhea.      Mouth/Throat:      Pharynx: Uvula midline.   Eyes:      General:         Right eye: No discharge.         Left eye: No discharge.      Conjunctiva/sclera: Conjunctivae normal.      Right eye: No chemosis.     Left eye: No chemosis.     Pupils: Pupils are equal, round, and reactive to light.   Neck:      Thyroid: No thyroid mass or thyromegaly.      Trachea: Trachea normal.   Cardiovascular:      Rate and Rhythm: Regular rhythm. Tachycardia present.      Heart sounds: Normal heart sounds.   Pulmonary:      Effort: Pulmonary effort is normal. No respiratory distress.      Breath sounds: Examination of the right-lower field reveals decreased breath sounds. Examination of the left-lower field reveals decreased breath sounds. Decreased breath sounds present. No wheezing.   Abdominal:      General: Bowel sounds are normal. There is no distension.      Palpations: Abdomen is soft.      Tenderness: There is no abdominal tenderness.   Musculoskeletal:         General: Normal range of motion.      Cervical back: Normal range of motion and neck supple.      Right lower le+ Edema present.      Left lower le+ Edema present.   Feet:      Right foot:      Skin integrity: Callus and dry skin present.      Left foot:      Skin integrity: Callus and dry skin present.   Lymphadenopathy:      Cervical: No cervical adenopathy.      Upper Body:      Right upper body: No supraclavicular adenopathy.      Left upper body: No supraclavicular adenopathy.   Skin:     General: Skin is warm and dry.      Capillary Refill: Capillary refill takes less than 2 seconds.      Findings: No rash.      Comments: LLE: erythema, chronic venous stasis   Neurological:      Mental Status: He is alert and oriented to person, place, and time.   Psychiatric:         Mood and Affect: Mood is not anxious.         Speech: Speech normal.         Behavior: Behavior normal.         Thought Content: Thought  content normal.         Judgment: Judgment normal.              CRANIAL NERVES     CN III, IV, VI   Pupils are equal, round, and reactive to light.       Significant Labs: All pertinent labs within the past 24 hours have been reviewed.  CBC:   Recent Labs   Lab 04/02/24  0817   WBC 4.29   HGB 14.7   HCT 44.7        CMP:   Recent Labs   Lab 04/02/24  0817      K 4.1      CO2 23      BUN 20   CREATININE 1.0   CALCIUM 9.2   PROT 7.5   ALBUMIN 3.2*   BILITOT 1.1*   ALKPHOS 59   AST 20   ALT 20   ANIONGAP 10     Cardiac Markers:   Recent Labs   Lab 04/02/24  0817   *     Troponin:   Recent Labs   Lab 04/02/24  0817   TROPONINI 0.064*       Significant Imaging: I have reviewed all pertinent imaging results/findings within the past 24 hours.  Assessment/Plan:     * Elevated troponin  Possibly secondary to demand ischemia. Reports orthopnea over the past 2 weeks and has had to remain upright, sleeping in a chair, intermittently. Chest pain, Dyspnea at home PTA. Seen by Cardiology on 4/1. Cardiology consulted, recommended Heparin infusion, Trend trop.    --trend trop  --Cont diuresis  --Tele  --Heparin per nomogram  --Nitro PRN chest pain  --Repeat EKG PRN  --Vitals Q4H  --NPO after midnight        Orthopnea  Reports unable to lay flat for the past 2 weeks, having to sit in a chair and upright at night.     --Cont diuresis    Chest pain  Reported chest pain began overnight. Although symptoms present for the past 2 weeks per patient. Seen in ED on 3/6/24, recommended admission, patient refused at that time.     --Cont diuresis  --care as above      Acute on chronic congestive heart failure  Patient is identified as having  Unknown, previous Diastolic HF  heart failure that is Acute on chronic. CHF is currently uncontrolled due to >3 pillow orthopnea and Pulmonary edema/pleural effusion on CXR. Latest ECHO performed and demonstrates- No results found for this or any previous visit.  . Continue  Beta Blocker and Furosemide and monitor clinical status closely. Monitor on telemetry. Patient is on CHF pathway.  Monitor strict Is&Os and daily weights.  Place on fluid restriction of 1.5 L. Cardiology has been consulted. Continue to stress to patient importance of self efficacy and  on diet for CHF. Last BNP reviewed- and noted below   Recent Labs   Lab 04/02/24  0817   *       Venous stasis dermatitis of both lower extremities  Chronic venous stasis. Worsened appearance of BLE, likely secondary to inability to elevate legs over the past 2 weeks due to orthopnea.     --Cont diuresis      Essential hypertension  Chronic, uncontrolled. Latest blood pressure and vitals reviewed-     Temp:  [97.8 °F (36.6 °C)]   Pulse:  [114-121]   Resp:  [22]   BP: (111-134)/(69-86)   SpO2:  [91 %-97 %] .   Home meds for hypertension were reviewed and noted below.   Hypertension Medications               metoprolol succinate (TOPROL-XL) 25 MG 24 hr tablet Take 1 tablet (25 mg total) by mouth once daily.    torsemide (DEMADEX) 20 MG Tab Take 1 tablet (20 mg total) by mouth 2 (two) times a day.            While in the hospital, will manage blood pressure as follows; Continue home antihypertensive regimen    Will utilize p.r.n. blood pressure medication only if patient's blood pressure greater than 160/100 and he develops symptoms such as worsening chest pain or shortness of breath.      VTE Risk Mitigation (From admission, onward)           Ordered     heparin 25,000 units in dextrose 5% (100 units/ml) IV bolus from bag LOW INTENSITY nomogram - [facility]  As needed (PRN)        Question:  Heparin Infusion Adjustment (DO NOT MODIFY ANSWER)  Answer:  [email or web address]\epic\Images\Pharmacy\HeparinInfusions\heparin LOW INTENSITY nomogram for [facility] [id number].pdf    04/02/24 1109     heparin 25,000 units in dextrose 5% (100 units/ml) IV bolus from bag LOW INTENSITY nomogram - [facility]  As needed (PRN)        Question:  Heparin Infusion Adjustment  (DO NOT MODIFY ANSWER)  Answer:  [email or web address]\epic\Images\Pharmacy\HeparinInfusions\heparin LOW INTENSITY nomogram for [facility] [id number].pdf    04/02/24 1109     Reason for No Pharmacological VTE Prophylaxis  Once        Comments: Heparin Infusion   Question:  Reasons:  Answer:  Physician Provided (leave comment)    04/02/24 1153     IP VTE HIGH RISK PATIENT  Once         04/02/24 1153     Place sequential compression device  Until discontinued         04/02/24 1153     heparin 25,000 units in dextrose 5% 250 mL (100 units/mL) infusion LOW INTENSITY nomogram - [facility]  Continuous        Question:  Begin at (units/kg/hr)  Answer:  12    04/02/24 1109                         On 04/02/2024, patient should be placed in hospital observation services under my care in collaboration with Francy Bruno MD.      AdmissionCare    Guideline: Chest Pain - OBS, Observation    Based on the indications selected for the patient, the bed status of Observation was determined to be MET    The following indications were selected as present at the time of evaluation of the patient:      - Other aspect of patient presentation indicative of need for monitoring or testing beyond emergency department treatment time frame (eg, concern for arrhythmia)    AdmissionCare documentation entered by: Kylee Eduardo    [facility], 27th edition, Copyright © 2023 [employer] All Rights Reserved.  [id number]:26:29-05:00    Kylee Eduardo NP  Department of Hospital Medicine  [facility] - Emergency Dept.

## 2024-04-02 NOTE — CONSULTS
[facility]  Cardiology  Consult Note    Patient Name: Chucho Clark  MRN: [id number]  Admission Date: 4/2/2024  Hospital Length of Stay: 0 days  Code Status: Full Code   Attending Provider: Francy Bruno MD   Consulting Provider: Suzanna Hernandez MD  Primary Care Physician: Ale, Primary Doctor  Principal Problem:Elevated troponin    Patient information was obtained from patient, past medical records, and ER records.     Inpatient consult to Cardiology  Consult performed by: Suzanna Hernandez MD  Consult ordered by: Ted Augustin Jr., MD  Reason for consult: CHF. NSTEMI        Subjective:     Chief Complaint:  CHF. NSTEMI      HPI:   78 y.o. male patient, PMHx: Diastolic HF, HTN, DVT, CAD. Presented to the Emergency Department for evaluation of CP which onset night PTA. Pt visited Dr. Corrales (Cardiology) for the first time on 4/1/24 and was initiated on Eliquis. Pt notes that he also felt some upper abd pain PTA that felt like acid reflux. Pt and son originally thought sxs were cold/flu sxs, but tested negative in the clinic. Symptoms are constant and moderate in severity. No mitigating or exacerbating factors reported. Associated sxs include SOB, palpitations, and increased HR. Patient denies any cough, numbness, HA, fever, n/v/d, and all other sxs at this time. Pt has not had a stress test. In the ED, vitals: 130/69, 121, 22, 97.8F, 95% RA. Significant Labs: BNP: 560, Trop: 0.064, Bili: 1.1. CXR: interstitial edema. EKG: Neg for STEMI. Cardiology consulted in ED. Treated with ASA, Nitro, BB, diuretic. Initiated on Heparin Infusion. Patient is a full code. Placed in observation under the care of Hospital Medicine for management of elevated troponin, ACS rule out.        Cardiology consulted      Past Medical History:   Diagnosis Date    CHF (congestive heart failure)     DVT (deep venous thrombosis)     Hypertension     Renal disorder        History reviewed. No pertinent surgical  history.    Review of patient's allergies indicates:   Allergen Reactions    Vancomycin Hives     Patient given vancomycin on 7/7/2020 developed hives.  Vancomycin added to allergy list.       No current facility-administered medications on file prior to encounter.     Current Outpatient Medications on File Prior to Encounter   Medication Sig    apixaban (ELIQUIS) 5 mg Tab Take 1 tablet (5 mg total) by mouth 2 (two) times daily.    metoprolol succinate (TOPROL-XL) 25 MG 24 hr tablet Take 1 tablet (25 mg total) by mouth once daily.    rosuvastatin (CRESTOR) 10 MG tablet Take 1 tablet (10 mg total) by mouth once daily.    torsemide (DEMADEX) 20 MG Tab Take 1 tablet (20 mg total) by mouth 2 (two) times a day.     Family History       Problem Relation (Age of Onset)    Cancer Mother    Diabetes Mother          Tobacco Use    Smoking status: Former    Smokeless tobacco: Former   Substance and Sexual Activity    Alcohol use: Yes    Drug use: Never    Sexual activity: Not on file     Review of Systems   Cardiovascular:  Positive for chest pain and dyspnea on exertion. Negative for palpitations and syncope.   Genitourinary: Negative.    Neurological: Negative.      Objective:     Vital Signs (Most Recent):  Temp: 97.8 °F (36.6 °C) (04/02/24 0741)  Pulse: (!) 118 (04/02/24 1002)  Resp: (!) 22 (04/02/24 0741)  BP: 114/80 (04/02/24 1002)  SpO2: (!) 91 % (04/02/24 1002) Vital Signs (24h Range):  Temp:  [97.8 °F (36.6 °C)] 97.8 °F (36.6 °C)  Pulse:  [114-121] 118  Resp:  [22] 22  SpO2:  [91 %-97 %] 91 %  BP: (111-134)/(69-86) 114/80     Weight: 129.8 kg (286 lb 2.5 oz)  Body mass index is 39.91 kg/m².    SpO2: (!) 91 %         Intake/Output Summary (Last 24 hours) at 4/2/2024 1246  Last data filed at 4/2/2024 1006  Gross per 24 hour   Intake 250 ml   Output --   Net 250 ml       Lines/Drains/Airways       Peripheral Intravenous Line  Duration                  Peripheral IV - Single Lumen 04/02/24 0813 20 G Posterior;Right  Forearm <1 day         Peripheral IV - Single Lumen 04/02/24 1224 20 G Left;Posterior Hand <1 day                     Physical Exam  Vitals reviewed.   Constitutional:       Appearance: He is well-developed.   Neck:      Vascular: No carotid bruit.   Cardiovascular:      Rate and Rhythm: Normal rate and regular rhythm.      Pulses: Intact distal pulses.      Heart sounds: Normal heart sounds. No murmur heard.  Pulmonary:      Breath sounds: Rales present.   Neurological:      Mental Status: He is oriented to person, place, and time.          Significant Labs: All pertinent lab results from the last 24 hours have been reviewed. and   Recent Lab Results         04/02/24  0817        Albumin 3.2       ALP 59       ALT 20       Anion Gap 10       PTT 29.3  Comment: Refer to local heparin nomogram for intensity/dose specific   therapeutic   range.         AST 20       Baso # 0.02       Basophil % 0.5       BILIRUBIN TOTAL 1.1  Comment: For infants and newborns, interpretation of results should be based  on gestational age, weight and in agreement with clinical  observations.    Premature Infant recommended reference ranges:  Up to 24 hours.............<8.0 mg/dL  Up to 48 hours............<12.0 mg/dL  3-5 days..................<15.0 mg/dL  6-29 days.................<15.0 mg/dL           Comment: Values of less than 100 pg/ml are consistent with non-CHF populations.       BUN 20       Calcium 9.2       Chloride 107       CO2 23       Creatinine 1.0       Differential Method Automated       eGFR >60       Eos # 0.0       Eos % 0.7       Glucose 105       Gran # (ANC) 2.7       Gran % 63.7       Hematocrit 44.7       Hemoglobin 14.7       Immature Grans (Abs) 0.01  Comment: Mild elevation in immature granulocytes is non specific and   can be seen in a variety of conditions including stress response,   acute inflammation, trauma and pregnancy. Correlation with other   laboratory and clinical findings is essential.          Immature Granulocytes 0.2       INR 1.3  Comment: Coumadin Therapy:  2.0 - 3.0 for INR for all indicators except mechanical heart valves  and antiphospholipid syndromes which should use 2.5 - 3.5.         Lymph # 1.0       Lymph % 24.2       MCH 32.2       MCHC 32.9       MCV 98       Mono # 0.5       Mono % 10.7       MPV 9.4       nRBC 0       Platelet Count 162       Potassium 4.1       PROTEIN TOTAL 7.5       PT 15.0       RBC 4.57       RDW 13.4       Sodium 140       Troponin I 0.064  Comment: The reference interval for Troponin I represents the 99th percentile   cutoff   for our facility and is consistent with 3rd generation assay   performance.         WBC 4.29               Significant Imaging: Echocardiogram: pending    Assessment and Plan:     Chest pain  Heparin iv   Trend trop   Echo   Aspirin   Will need ischemic work up prior to discharge  Cw toprol    Acute on chronic congestive heart failure  Patient is identified as having Diastolic (HFpEF) heart failure that is Acute. CHF is currently uncontrolled due to Pulmonary edema/pleural effusion on CXR. Latest ECHO performed and demonstrates- No results found for this or any previous visit.  . Continue Furosemide and monitor clinical status closely. Monitor on telemetry. Patient is on CHF pathway.  Monitor strict Is&Os and daily weights.  Place on fluid restriction of 2 L. Cardiology has been consulted. Continue to stress to patient importance of self efficacy and  on diet for CHF. Last BNP reviewed- and noted below   Recent Labs   Lab 04/02/24  0817   *   Cw iv lasix         VTE Risk Mitigation (From admission, onward)           Ordered     heparin 25,000 units in dextrose 5% (100 units/ml) IV bolus from bag LOW INTENSITY nomogram - [facility]  As needed (PRN)        Question:  Heparin Infusion Adjustment (DO NOT MODIFY ANSWER)  Answer:  [email or web address]\epic\Images\Pharmacy\HeparinInfusions\heparin LOW INTENSITY nomogram for [facility] [id number].pdf     04/02/24 1109     heparin 25,000 units in dextrose 5% (100 units/ml) IV bolus from bag LOW INTENSITY nomogram - [facility]  As needed (PRN)        Question:  Heparin Infusion Adjustment (DO NOT MODIFY ANSWER)  Answer:  [email or web address]\epic\Images\Pharmacy\HeparinInfusions\heparin LOW INTENSITY nomogram for [facility] [id number].pdf    04/02/24 1109     Reason for No Pharmacological VTE Prophylaxis  Once        Comments: Heparin Infusion   Question:  Reasons:  Answer:  Physician Provided (leave comment)    04/02/24 1153     IP VTE HIGH RISK PATIENT  Once         04/02/24 1153     Place sequential compression device  Until discontinued         04/02/24 1153     heparin 25,000 units in dextrose 5% 250 mL (100 units/mL) infusion LOW INTENSITY nomogram - [facility]  Continuous        Question:  Begin at (units/kg/hr)  Answer:  12    04/02/24 1109                    Thank you for your consult. I will follow-up with patient. Please contact us if you have any additional questions.    Suzanna Hernandez MD  Cardiology   [facility] - Emergency Dept.

## 2024-04-03 ENCOUNTER — DOCUMENTATION ONLY (OUTPATIENT)
Dept: CARDIOLOGY | Facility: CLINIC | Age: 79
End: 2024-04-03
Payer: MEDICARE

## 2024-04-03 PROBLEM — I48.92 ATRIAL FLUTTER: Status: ACTIVE | Noted: 2024-04-03

## 2024-04-03 LAB
ALBUMIN SERPL BCP-MCNC: 3.2 G/DL (ref 3.5–5.2)
ALP SERPL-CCNC: 61 U/L (ref 55–135)
ALT SERPL W/O P-5'-P-CCNC: 15 U/L (ref 10–44)
ANION GAP SERPL CALC-SCNC: 9 MMOL/L (ref 8–16)
AORTIC ROOT ANNULUS: 3.73 CM
APTT PPP: 44.6 SEC (ref 21–32)
ASCENDING AORTA: 3.42 CM
AST SERPL-CCNC: 16 U/L (ref 10–40)
AV INDEX (PROSTH): 0.68
AV MEAN GRADIENT: 5 MMHG
AV PEAK GRADIENT: 8 MMHG
AV REGURGITATION PRESSURE HALF TIME: 868.02 MS
AV VALVE AREA BY VELOCITY RATIO: 2.15 CM²
AV VALVE AREA: 2.37 CM²
AV VELOCITY RATIO: 0.62
BASOPHILS # BLD AUTO: 0.03 K/UL (ref 0–0.2)
BASOPHILS NFR BLD: 0.7 % (ref 0–1.9)
BILIRUB SERPL-MCNC: 1.2 MG/DL (ref 0.1–1)
BSA FOR ECHO PROCEDURE: 2.55 M2
BUN SERPL-MCNC: 20 MG/DL (ref 8–23)
CALCIUM SERPL-MCNC: 9.4 MG/DL (ref 8.7–10.5)
CHLORIDE SERPL-SCNC: 102 MMOL/L (ref 95–110)
CO2 SERPL-SCNC: 29 MMOL/L (ref 23–29)
CREAT SERPL-MCNC: 1 MG/DL (ref 0.5–1.4)
CV ECHO LV RWT: 0.35 CM
DIFFERENTIAL METHOD BLD: ABNORMAL
DOP CALC AO PEAK VEL: 1.43 M/S
DOP CALC AO VTI: 19.9 CM
DOP CALC LVOT AREA: 3.5 CM2
DOP CALC LVOT DIAMETER: 2.11 CM
DOP CALC LVOT PEAK VEL: 0.88 M/S
DOP CALC LVOT STROKE VOLUME: 47.18 CM3
DOP CALC RVOT PEAK VEL: 0.57 M/S
DOP CALC RVOT VTI: 7.4 CM
DOP CALCLVOT PEAK VEL VTI: 13.5 CM
E WAVE DECELERATION TIME: 102.45 MSEC
E/A RATIO: 1.26
E/E' RATIO: 12.7 M/S
ECHO LV POSTERIOR WALL: 1.02 CM (ref 0.6–1.1)
EOSINOPHIL # BLD AUTO: 0.1 K/UL (ref 0–0.5)
EOSINOPHIL NFR BLD: 1.2 % (ref 0–8)
ERYTHROCYTE [DISTWIDTH] IN BLOOD BY AUTOMATED COUNT: 13.5 % (ref 11.5–14.5)
EST. GFR  (NO RACE VARIABLE): >60 ML/MIN/1.73 M^2
FRACTIONAL SHORTENING: 16 % (ref 28–44)
GLUCOSE SERPL-MCNC: 88 MG/DL (ref 70–110)
HCT VFR BLD AUTO: 43 % (ref 40–54)
HGB BLD-MCNC: 14.1 G/DL (ref 14–18)
IMM GRANULOCYTES # BLD AUTO: 0.01 K/UL (ref 0–0.04)
IMM GRANULOCYTES NFR BLD AUTO: 0.2 % (ref 0–0.5)
INTERVENTRICULAR SEPTUM: 1.29 CM (ref 0.6–1.1)
IVC DIAMETER: 2.33 CM
IVRT: 53.28 MSEC
LA MAJOR: 7.13 CM
LA MINOR: 7.08 CM
LA WIDTH: 5 CM
LEFT ATRIUM SIZE: 4.81 CM
LEFT ATRIUM VOLUME INDEX: 59 ML/M2
LEFT ATRIUM VOLUME: 145.24 CM3
LEFT INTERNAL DIMENSION IN SYSTOLE: 4.88 CM (ref 2.1–4)
LEFT VENTRICLE DIASTOLIC VOLUME INDEX: 67.44 ML/M2
LEFT VENTRICLE DIASTOLIC VOLUME: 165.91 ML
LEFT VENTRICLE MASS INDEX: 114 G/M2
LEFT VENTRICLE SYSTOLIC VOLUME INDEX: 45.5 ML/M2
LEFT VENTRICLE SYSTOLIC VOLUME: 112 ML
LEFT VENTRICULAR INTERNAL DIMENSION IN DIASTOLE: 5.79 CM (ref 3.5–6)
LEFT VENTRICULAR MASS: 281.26 G
LV LATERAL E/E' RATIO: 11.55 M/S
LV SEPTAL E/E' RATIO: 14.11 M/S
LVOT MG: 2.34 MMHG
LVOT MV: 0.76 CM/S
LYMPHOCYTES # BLD AUTO: 1.3 K/UL (ref 1–4.8)
LYMPHOCYTES NFR BLD: 31.1 % (ref 18–48)
MAGNESIUM SERPL-MCNC: 1.8 MG/DL (ref 1.6–2.6)
MCH RBC QN AUTO: 32 PG (ref 27–31)
MCHC RBC AUTO-ENTMCNC: 32.8 G/DL (ref 32–36)
MCV RBC AUTO: 98 FL (ref 82–98)
MONOCYTES # BLD AUTO: 0.5 K/UL (ref 0.3–1)
MONOCYTES NFR BLD: 10.6 % (ref 4–15)
MV PEAK A VEL: 1.01 M/S
MV PEAK E VEL: 1.27 M/S
MV STENOSIS PRESSURE HALF TIME: 29.71 MS
MV VALVE AREA P 1/2 METHOD: 7.4 CM2
NEUTROPHILS # BLD AUTO: 2.4 K/UL (ref 1.8–7.7)
NEUTROPHILS NFR BLD: 56.2 % (ref 38–73)
NRBC BLD-RTO: 0 /100 WBC
OHS QRS DURATION: 130 MS
OHS QRS DURATION: 142 MS
OHS QTC CALCULATION: 499 MS
OHS QTC CALCULATION: 510 MS
PHOSPHATE SERPL-MCNC: 3.8 MG/DL (ref 2.7–4.5)
PISA AR MAX VEL: 2.74 M/S
PISA MRMAX VEL: 6.01 M/S
PISA TR MAX VEL: 2.74 M/S
PLATELET # BLD AUTO: 177 K/UL (ref 150–450)
PMV BLD AUTO: 10.6 FL (ref 9.2–12.9)
POCT GLUCOSE: 107 MG/DL (ref 70–110)
POTASSIUM SERPL-SCNC: 3.6 MMOL/L (ref 3.5–5.1)
PROT SERPL-MCNC: 6.9 G/DL (ref 6–8.4)
PV MEAN GRADIENT: 1 MMHG
RA MAJOR: 6.17 CM
RA PRESSURE ESTIMATED: 15 MMHG
RA WIDTH: 3.7 CM
RBC # BLD AUTO: 4.4 M/UL (ref 4.6–6.2)
RIGHT VENTRICULAR END-DIASTOLIC DIMENSION: 3.32 CM
RV TB RVSP: 18 MMHG
SODIUM SERPL-SCNC: 140 MMOL/L (ref 136–145)
STJ: 3.46 CM
TDI LATERAL: 0.11 M/S
TDI SEPTAL: 0.09 M/S
TDI: 0.1 M/S
TR MAX PG: 30 MMHG
TR MEAN GRADIENT: 28 MMHG
TRICUSPID ANNULAR PLANE SYSTOLIC EXCURSION: 1.17 CM
TV REST PULMONARY ARTERY PRESSURE: 45 MMHG
WBC # BLD AUTO: 4.24 K/UL (ref 3.9–12.7)
Z-SCORE OF LEFT VENTRICULAR DIMENSION IN END DIASTOLE: -7.76
Z-SCORE OF LEFT VENTRICULAR DIMENSION IN END SYSTOLE: -3.39

## 2024-04-03 PROCEDURE — 11000001 HC ACUTE MED/SURG PRIVATE ROOM

## 2024-04-03 PROCEDURE — 93010 ELECTROCARDIOGRAM REPORT: CPT | Mod: ,,, | Performed by: INTERNAL MEDICINE

## 2024-04-03 PROCEDURE — 25000003 PHARM REV CODE 250: Performed by: NURSE PRACTITIONER

## 2024-04-03 PROCEDURE — 80053 COMPREHEN METABOLIC PANEL: CPT | Performed by: NURSE PRACTITIONER

## 2024-04-03 PROCEDURE — 84100 ASSAY OF PHOSPHORUS: CPT | Performed by: NURSE PRACTITIONER

## 2024-04-03 PROCEDURE — 63600175 PHARM REV CODE 636 W HCPCS: Performed by: INTERNAL MEDICINE

## 2024-04-03 PROCEDURE — 25000003 PHARM REV CODE 250: Performed by: INTERNAL MEDICINE

## 2024-04-03 PROCEDURE — 25000003 PHARM REV CODE 250: Performed by: PHYSICIAN ASSISTANT

## 2024-04-03 PROCEDURE — 85730 THROMBOPLASTIN TIME PARTIAL: CPT | Performed by: EMERGENCY MEDICINE

## 2024-04-03 PROCEDURE — 36415 COLL VENOUS BLD VENIPUNCTURE: CPT | Performed by: EMERGENCY MEDICINE

## 2024-04-03 PROCEDURE — 99233 SBSQ HOSP IP/OBS HIGH 50: CPT | Mod: ,,, | Performed by: INTERNAL MEDICINE

## 2024-04-03 PROCEDURE — 63600175 PHARM REV CODE 636 W HCPCS: Performed by: EMERGENCY MEDICINE

## 2024-04-03 PROCEDURE — 83735 ASSAY OF MAGNESIUM: CPT | Performed by: NURSE PRACTITIONER

## 2024-04-03 PROCEDURE — 93005 ELECTROCARDIOGRAM TRACING: CPT

## 2024-04-03 PROCEDURE — 85025 COMPLETE CBC W/AUTO DIFF WBC: CPT | Performed by: EMERGENCY MEDICINE

## 2024-04-03 PROCEDURE — 63600175 PHARM REV CODE 636 W HCPCS: Performed by: PHYSICIAN ASSISTANT

## 2024-04-03 RX ORDER — ASPIRIN 81 MG/1
81 TABLET ORAL DAILY
Status: DISCONTINUED | OUTPATIENT
Start: 2024-04-03 | End: 2024-04-12 | Stop reason: HOSPADM

## 2024-04-03 RX ADMIN — ASPIRIN 81 MG: 81 TABLET, COATED ORAL at 08:04

## 2024-04-03 RX ADMIN — AMIODARONE HYDROCHLORIDE 1 MG/MIN: 1.8 INJECTION, SOLUTION INTRAVENOUS at 02:04

## 2024-04-03 RX ADMIN — ATORVASTATIN CALCIUM 40 MG: 40 TABLET, FILM COATED ORAL at 08:04

## 2024-04-03 RX ADMIN — METOLAZONE 5 MG: 5 TABLET ORAL at 08:04

## 2024-04-03 RX ADMIN — AMIODARONE HYDROCHLORIDE 150 MG: 1.5 INJECTION, SOLUTION INTRAVENOUS at 02:04

## 2024-04-03 RX ADMIN — FUROSEMIDE 60 MG: 10 INJECTION, SOLUTION INTRAMUSCULAR; INTRAVENOUS at 09:04

## 2024-04-03 RX ADMIN — AMIODARONE HYDROCHLORIDE 0.5 MG/MIN: 1.8 INJECTION, SOLUTION INTRAVENOUS at 09:04

## 2024-04-03 RX ADMIN — FUROSEMIDE 60 MG: 10 INJECTION, SOLUTION INTRAMUSCULAR; INTRAVENOUS at 02:04

## 2024-04-03 RX ADMIN — METOPROLOL SUCCINATE 25 MG: 25 TABLET, EXTENDED RELEASE ORAL at 08:04

## 2024-04-03 RX ADMIN — FUROSEMIDE 60 MG: 10 INJECTION, SOLUTION INTRAMUSCULAR; INTRAVENOUS at 05:04

## 2024-04-03 RX ADMIN — HEPARIN SODIUM 12 UNITS/KG/HR: 10000 INJECTION, SOLUTION INTRAVENOUS at 07:04

## 2024-04-03 NOTE — ASSESSMENT & PLAN NOTE
-Flat  -Likely secondary to demand ischemia from new onset atrial flutter and combined CHF  -Continue ASA, BB, heparin gtt, statin  -Ischemic workup with LHC once better compensated

## 2024-04-03 NOTE — ASSESSMENT & PLAN NOTE
Patient is identified as having Combined Systolic and Diastolic heart failure that is Acute on chronic. CHF is currently uncontrolled due to >3 pillow orthopnea and Rales/crackles on pulmonary exam. Latest ECHO performed and demonstrates- Results for orders placed during the hospital encounter of 04/02/24    Echo Saline Bubble? No    Interpretation Summary    Left Ventricle: The left ventricle is moderately dilated. Mildly increased wall thickness. There is severely reduced systolic function with a visually estimated ejection fraction of 20 - 25%. Grade II diastolic dysfunction.    Right Ventricle: Normal right ventricular cavity size. Wall thickness is normal. Right ventricle wall motion  is normal. Systolic function is moderately reduced.    Left Atrium: Left atrium is severely dilated.    Aortic Valve: There is moderate aortic valve sclerosis. There is mild aortic regurgitation.    Mitral Valve: There is severe regurgitation.    Tricuspid Valve: There is mild regurgitation.    Pulmonary Artery: The estimated pulmonary artery systolic pressure is 45 mmHg.    IVC/SVC: Elevated venous pressure at 15 mmHg.  . Continue Beta Blocker and Furosemide and monitor clinical status closely. Monitor on telemetry. Patient is on CHF pathway.  Monitor strict Is&Os and daily weights.  Place on fluid restriction of 1.5 L. Cardiology has been consulted. Continue to stress to patient importance of self efficacy and  on diet for CHF. Last BNP reviewed- and noted below   Recent Labs   Lab 04/02/24  0817   *     -continue IV diuretic  -will need ischemic workup as reduced EF new finding

## 2024-04-03 NOTE — ASSESSMENT & PLAN NOTE
Patient is identified as having Diastolic (HFpEF) heart failure that is Acute. CHF is currently uncontrolled due to Pulmonary edema/pleural effusion on CXR. Latest ECHO performed and demonstrates- No results found for this or any previous visit.  . Continue Furosemide and monitor clinical status closely. Monitor on telemetry. Patient is on CHF pathway.  Monitor strict Is&Os and daily weights.  Place on fluid restriction of 2 L. Cardiology has been consulted. Continue to stress to patient importance of self efficacy and  on diet for CHF. Last BNP reviewed- and noted below   Recent Labs   Lab 04/02/24  0817   *     Cw iv lasix     4/3/24  -TTE with EF of 20-25%, moderately reduced RV function, pulmonary HTN, severe MR  -Continue IV diuresis  -Continue BB  -Will add ARB vs Entresto pending creatinine/BP trend  -Strict I's/O's  -Probable R/LHC tmw pending reassessment

## 2024-04-03 NOTE — SUBJECTIVE & OBJECTIVE
Review of Systems   Constitutional: Positive for malaise/fatigue.   HENT: Negative.     Eyes: Negative.    Cardiovascular:  Positive for dyspnea on exertion and leg swelling.   Respiratory:  Positive for shortness of breath.    Endocrine: Negative.    Hematologic/Lymphatic: Negative.    Skin: Negative.    Musculoskeletal: Negative.    Gastrointestinal: Negative.    Genitourinary: Negative.    Neurological: Negative.    Psychiatric/Behavioral: Negative.     Allergic/Immunologic: Negative.      Objective:     Vital Signs (Most Recent):  Temp: 97.4 °F (36.3 °C) (04/03/24 1139)  Pulse: 110 (04/03/24 1139)  Resp: 20 (04/03/24 1139)  BP: 115/63 (04/03/24 1139)  SpO2: (!) 89 % (04/03/24 1139) Vital Signs (24h Range):  Temp:  [97.4 °F (36.3 °C)-98 °F (36.7 °C)] 97.4 °F (36.3 °C)  Pulse:  [102-120] 110  Resp:  [17-20] 20  SpO2:  [89 %-96 %] 89 %  BP: ()/(63-80) 115/63     Weight: 123.4 kg (272 lb 0.8 oz)  Body mass index is 37.94 kg/m².     SpO2: (!) 89 %         Intake/Output Summary (Last 24 hours) at 4/3/2024 1349  Last data filed at 4/3/2024 0830  Gross per 24 hour   Intake 261.81 ml   Output 4025 ml   Net -3763.19 ml       Lines/Drains/Airways       Peripheral Intravenous Line  Duration                  Peripheral IV - Single Lumen 04/02/24 0813 20 G Posterior;Right Forearm 1 day         Peripheral IV - Single Lumen 04/02/24 1224 20 G Left;Posterior Hand 1 day                       Physical Exam  Vitals and nursing note reviewed.   Constitutional:       General: He is not in acute distress.     Appearance: Normal appearance. He is well-developed. He is not diaphoretic.   HENT:      Head: Normocephalic and atraumatic.   Eyes:      General:         Right eye: No discharge.         Left eye: No discharge.      Pupils: Pupils are equal, round, and reactive to light.   Neck:      Vascular: JVD present.   Cardiovascular:      Rate and Rhythm: Tachycardia present. Rhythm regularly irregular.      Heart sounds: S1  normal and S2 normal. Murmur heard.      High-pitched blowing holosystolic murmur is present at the apex.   Pulmonary:      Effort: Pulmonary effort is normal. No respiratory distress.      Breath sounds: Rales present. No wheezing.   Abdominal:      General: There is no distension.   Musculoskeletal:      Right lower leg: Edema (pitting to thighs) present.      Left lower leg: Edema (pitting to thighs) present.   Skin:     General: Skin is warm and dry.      Findings: No erythema.   Neurological:      General: No focal deficit present.      Mental Status: He is alert and oriented to person, place, and time.   Psychiatric:         Mood and Affect: Mood normal.         Behavior: Behavior normal.            Significant Labs: CMP   Recent Labs   Lab 04/02/24  0817 04/03/24  0558    140   K 4.1 3.6    102   CO2 23 29    88   BUN 20 20   CREATININE 1.0 1.0   CALCIUM 9.2 9.4   PROT 7.5 6.9   ALBUMIN 3.2* 3.2*   BILITOT 1.1* 1.2*   ALKPHOS 59 61   AST 20 16   ALT 20 15   ANIONGAP 10 9   , CBC   Recent Labs   Lab 04/02/24  0817 04/03/24  0558   WBC 4.29 4.24   HGB 14.7 14.1   HCT 44.7 43.0    177   , Troponin   Recent Labs   Lab 04/02/24  0817 04/02/24  1151 04/02/24  1737   TROPONINI 0.064* 0.054* 0.064*   , and All pertinent lab results from the last 24 hours have been reviewed.    Significant Imaging: Echocardiogram: Transthoracic echo (TTE) complete (Cupid Only):   Results for orders placed or performed during the hospital encounter of 04/02/24   Echo Saline Bubble? No   Result Value Ref Range    BSA 2.55 m2    LVOT stroke volume 47.18 cm3    LVIDd 5.79 3.5 - 6.0 cm    LV Systolic Volume 112.00 mL    LV Systolic Volume Index 45.5 mL/m2    LVIDs 4.88 (A) 2.1 - 4.0 cm    LV Diastolic Volume 165.91 mL    LV Diastolic Volume Index 67.44 mL/m2    IVS 1.29 (A) 0.6 - 1.1 cm    LVOT diameter 2.11 cm    LVOT area 3.5 cm2    FS 16 (A) 28 - 44 %    Left Ventricle Relative Wall Thickness 0.35 cm     Posterior Wall 1.02 0.6 - 1.1 cm    LV mass 281.26 g    LV Mass Index 114 g/m2    MV Peak E Guru 1.27 m/s    TDI LATERAL 0.11 m/s    TDI SEPTAL 0.09 m/s    E/E' ratio 12.70 m/s    MV Peak A Guru 1.01 m/s    TR Max Guru 2.74 m/s    E/A ratio 1.26     IVRT 53.28 msec    E wave deceleration time 102.45 msec    LV SEPTAL E/E' RATIO 14.11 m/s    LV LATERAL E/E' RATIO 11.55 m/s    LVOT peak guru 0.88 m/s    Left Ventricular Outflow Tract Mean Velocity 0.76 cm/s    Left Ventricular Outflow Tract Mean Gradient 2.34 mmHg    RVDD 3.32 cm    RVOT peak VTI 7.4 cm    TAPSE 1.17 cm    LA size 4.81 cm    Left Atrium Minor Axis 7.08 cm    Left Atrium Major Axis 7.13 cm    RA Major Axis 6.17 cm    AV regurgitation pressure 1/2 time 868.426884158404906 ms    AR Max Guur 2.74 m/s    AV mean gradient 5 mmHg    AV peak gradient 8 mmHg    Ao peak guru 1.43 m/s    Ao VTI 19.90 cm    LVOT peak VTI 13.50 cm    AV valve area 2.37 cm²    AV Velocity Ratio 0.62     AV index (prosthetic) 0.68     ZENIA by Velocity Ratio 2.15 cm²    Mr max guru 6.01 m/s    MV stenosis pressure 1/2 time 29.71 ms    MV valve area p 1/2 method 7.40 cm2    TV mean gradient 28 mmHg    Triscuspid Valve Regurgitation Peak Gradient 30 mmHg    PV mean gradient 1 mmHg    RVOT peak guru 0.57 m/s    Ao root annulus 3.73 cm    STJ 3.46 cm    Ascending aorta 3.42 cm    IVC diameter 2.33 cm    Mean e' 0.10 m/s    ZLVIDS -3.39     ZLVIDD -7.76     LA Volume Index 59.0 mL/m2    LA volume 145.24 cm3    LA WIDTH 5.0 cm    RA Width 3.7 cm    TV resting pulmonary artery pressure 45 mmHg    RV TB RVSP 18 mmHg    Est. RA pres 15 mmHg    Narrative      Left Ventricle: The left ventricle is moderately dilated. Mildly   increased wall thickness. There is severely reduced systolic function with   a visually estimated ejection fraction of 20 - 25%. Grade II diastolic   dysfunction.    Right Ventricle: Normal right ventricular cavity size. Wall thickness   is normal. Right ventricle wall motion  is  normal. Systolic function is   moderately reduced.    Left Atrium: Left atrium is severely dilated.    Aortic Valve: There is moderate aortic valve sclerosis. There is mild   aortic regurgitation.    Mitral Valve: There is severe regurgitation.    Tricuspid Valve: There is mild regurgitation.    Pulmonary Artery: The estimated pulmonary artery systolic pressure is   45 mmHg.    IVC/SVC: Elevated venous pressure at 15 mmHg.      and EKG: Reviewed

## 2024-04-03 NOTE — ASSESSMENT & PLAN NOTE
-Currently in aflutter HR low 100's  -Contributing to volume status/cardiomyopathy  -Continue BB  -Start amiodarone drip  -Continue heparin drip  -Will need RODERICK/DCCV post ischemic workup as well as EP evaluation to discuss ablation in future

## 2024-04-03 NOTE — ASSESSMENT & PLAN NOTE
Heparin iv   Trend trop   Echo   Aspirin   Will need ischemic work up prior to discharge  Cw toprol    4/3/24  -Stable, troponin flat  -Continue ASA, BB, statin, heparin gtt  -Probable LHC tmw pending reassessment of volume status

## 2024-04-03 NOTE — HOSPITAL COURSE
4/3/24-Patient seen and examined today, resting in bed. Feeling better. Less SOB, diuresed well overnight. Still has significant BLE edema. Labs reviewed/stable. EKG with aflutter. TTE with EF of 20-25%, decreased RV function, severe MR. Troponin flat. Discussed ischemic workup possibly tmw pending clinical condition.     4.4.2024  Still in aflutter, swelling significantly improved   In bed, laying comfortably    4/5/24-Patient seen and examined today, s/p R/LHC yesterday which showed severe triple vessel disease. Underwent RODERICK/DCCV with restoration of SR. Feels ok. Does have some increase in SOB/orthopnea. No CP symptoms. Remains in SR. Reviewed case with patient/CTS, will proceed with repeat LHC today and PCI. LifeVest ordered for SCD prevention.    4.7.2024  Still in aflutter  Discussed with son and patient , sister   Agreeable with dccv    4/8/24-Patient seen and examined today, resting in bed. Family at bedside. Feels ok. SOB stable. No CP. Remains in SR. IABP in place. Labs reviewed. Na 132, creatinine 1.1. Repeat LHC with RCA intervention planned today by Dr. Mayers.    4/9/24-Patient seen and examined today, s/p LHC yesterday with successful PCI of RCA. IABP removed this AM. Patient feels ok, does admit to fatigue. No CP or SOB. Labs reviewed/stable. BP soft, BNP pending. Will likely switch to po Lasix tmw.    4/10/24: Pt seen and examined in ICU this am. No acute issues noted.  No angina/CHF issues.  Labs/chart reviewed. Pt states  he feels ok today.  BP soft.    4/11/24-Patient seen and examined today, sitting up in bedside chair. Ambulated earlier. Complains of back/buttock/left shin pain. No SOB or CP. BP ok, po Lasix initiated. Remains in SR. Labs reviewed.     4/12/24: pt seen and examined in ICU this am. No acute issues noted. Stable CV status. Chart/labs reviewed.  Going to rehab next step.

## 2024-04-03 NOTE — PROGRESS NOTES
[facility] - Med Surg 3  Cardiology  Progress Note    Patient Name: Chucho Clark  MRN: [id number]  Admission Date: 4/2/2024  Hospital Length of Stay: 0 days  Code Status: Full Code   Attending Physician: Francy Bruno MD   Primary Care Physician: Ale, Primary Doctor  Expected Discharge Date:   Principal Problem:Acute on chronic congestive heart failure    Subjective:   HPI:  78 y.o. male patient, PMHx: Diastolic HF, HTN, DVT, CAD. Presented to the Emergency Department for evaluation of CP which onset night PTA. Pt visited Dr. Corrales (Cardiology) for the first time on 4/1/24 and was initiated on Eliquis. Pt notes that he also felt some upper abd pain PTA that felt like acid reflux. Pt and son originally thought sxs were cold/flu sxs, but tested negative in the clinic. Symptoms are constant and moderate in severity. No mitigating or exacerbating factors reported. Associated sxs include SOB, palpitations, and increased HR. Patient denies any cough, numbness, HA, fever, n/v/d, and all other sxs at this time. Pt has not had a stress test. In the ED, vitals: 130/69, 121, 22, 97.8F, 95% RA. Significant Labs: BNP: 560, Trop: 0.064, Bili: 1.1. CXR: interstitial edema. EKG: Neg for STEMI. Cardiology consulted in ED. Treated with ASA, Nitro, BB, diuretic. Initiated on Heparin Infusion. Patient is a full code. Placed in observation under the care of Hospital Medicine for management of elevated troponin, ACS rule out.     Hospital Course:   4/3/24-Patient seen and examined today, resting in bed. Feeling better. Less SOB, diuresed well overnight. Still has significant BLE edema. Labs reviewed/stable. EKG with aflutter. TTE with EF of 20-25%, decreased RV function, severe MR. Troponin flat. Discussed ischemic workup possibly tmw pending clinical condition.         Review of Systems   Constitutional: Positive for malaise/fatigue.   HENT: Negative.     Eyes: Negative.    Cardiovascular:  Positive for dyspnea on exertion and leg  swelling.   Respiratory:  Positive for shortness of breath.    Endocrine: Negative.    Hematologic/Lymphatic: Negative.    Skin: Negative.    Musculoskeletal: Negative.    Gastrointestinal: Negative.    Genitourinary: Negative.    Neurological: Negative.    Psychiatric/Behavioral: Negative.     Allergic/Immunologic: Negative.      Objective:     Vital Signs (Most Recent):  Temp: 97.4 °F (36.3 °C) (04/03/24 1139)  Pulse: 110 (04/03/24 1139)  Resp: 20 (04/03/24 1139)  BP: 115/63 (04/03/24 1139)  SpO2: (!) 89 % (04/03/24 1139) Vital Signs (24h Range):  Temp:  [97.4 °F (36.3 °C)-98 °F (36.7 °C)] 97.4 °F (36.3 °C)  Pulse:  [102-120] 110  Resp:  [17-20] 20  SpO2:  [89 %-96 %] 89 %  BP: ()/(63-80) 115/63     Weight: 123.4 kg (272 lb 0.8 oz)  Body mass index is 37.94 kg/m².     SpO2: (!) 89 %         Intake/Output Summary (Last 24 hours) at 4/3/2024 1349  Last data filed at 4/3/2024 0830  Gross per 24 hour   Intake 261.81 ml   Output 4025 ml   Net -3763.19 ml       Lines/Drains/Airways       Peripheral Intravenous Line  Duration                  Peripheral IV - Single Lumen 04/02/24 0813 20 G Posterior;Right Forearm 1 day         Peripheral IV - Single Lumen 04/02/24 1224 20 G Left;Posterior Hand 1 day                       Physical Exam  Vitals and nursing note reviewed.   Constitutional:       General: He is not in acute distress.     Appearance: Normal appearance. He is well-developed. He is not diaphoretic.   HENT:      Head: Normocephalic and atraumatic.   Eyes:      General:         Right eye: No discharge.         Left eye: No discharge.      Pupils: Pupils are equal, round, and reactive to light.   Neck:      Vascular: JVD present.   Cardiovascular:      Rate and Rhythm: Tachycardia present. Rhythm regularly irregular.      Heart sounds: S1 normal and S2 normal. Murmur heard.      High-pitched blowing holosystolic murmur is present at the apex.   Pulmonary:      Effort: Pulmonary effort is normal. No  respiratory distress.      Breath sounds: Rales present. No wheezing.   Abdominal:      General: There is no distension.   Musculoskeletal:      Right lower leg: Edema (pitting to thighs) present.      Left lower leg: Edema (pitting to thighs) present.   Skin:     General: Skin is warm and dry.      Findings: No erythema.   Neurological:      General: No focal deficit present.      Mental Status: He is alert and oriented to person, place, and time.   Psychiatric:         Mood and Affect: Mood normal.         Behavior: Behavior normal.            Significant Labs: CMP   Recent Labs   Lab 04/02/24  0817 04/03/24  0558    140   K 4.1 3.6    102   CO2 23 29    88   BUN 20 20   CREATININE 1.0 1.0   CALCIUM 9.2 9.4   PROT 7.5 6.9   ALBUMIN 3.2* 3.2*   BILITOT 1.1* 1.2*   ALKPHOS 59 61   AST 20 16   ALT 20 15   ANIONGAP 10 9   , CBC   Recent Labs   Lab 04/02/24  0817 04/03/24  0558   WBC 4.29 4.24   HGB 14.7 14.1   HCT 44.7 43.0    177   , Troponin   Recent Labs   Lab 04/02/24  0817 04/02/24  1151 04/02/24  1737   TROPONINI 0.064* 0.054* 0.064*   , and All pertinent lab results from the last 24 hours have been reviewed.    Significant Imaging: Echocardiogram: Transthoracic echo (TTE) complete (Cupid Only):   Results for orders placed or performed during the hospital encounter of 04/02/24   Echo Saline Bubble? No   Result Value Ref Range    BSA 2.55 m2    LVOT stroke volume 47.18 cm3    LVIDd 5.79 3.5 - 6.0 cm    LV Systolic Volume 112.00 mL    LV Systolic Volume Index 45.5 mL/m2    LVIDs 4.88 (A) 2.1 - 4.0 cm    LV Diastolic Volume 165.91 mL    LV Diastolic Volume Index 67.44 mL/m2    IVS 1.29 (A) 0.6 - 1.1 cm    LVOT diameter 2.11 cm    LVOT area 3.5 cm2    FS 16 (A) 28 - 44 %    Left Ventricle Relative Wall Thickness 0.35 cm    Posterior Wall 1.02 0.6 - 1.1 cm    LV mass 281.26 g    LV Mass Index 114 g/m2    MV Peak E Guru 1.27 m/s    TDI LATERAL 0.11 m/s    TDI SEPTAL 0.09 m/s    E/E' ratio  12.70 m/s    MV Peak A Guru 1.01 m/s    TR Max Guru 2.74 m/s    E/A ratio 1.26     IVRT 53.28 msec    E wave deceleration time 102.45 msec    LV SEPTAL E/E' RATIO 14.11 m/s    LV LATERAL E/E' RATIO 11.55 m/s    LVOT peak guru 0.88 m/s    Left Ventricular Outflow Tract Mean Velocity 0.76 cm/s    Left Ventricular Outflow Tract Mean Gradient 2.34 mmHg    RVDD 3.32 cm    RVOT peak VTI 7.4 cm    TAPSE 1.17 cm    LA size 4.81 cm    Left Atrium Minor Axis 7.08 cm    Left Atrium Major Axis 7.13 cm    RA Major Axis 6.17 cm    AV regurgitation pressure 1/2 time 868.433984722801967 ms    AR Max Guru 2.74 m/s    AV mean gradient 5 mmHg    AV peak gradient 8 mmHg    Ao peak guru 1.43 m/s    Ao VTI 19.90 cm    LVOT peak VTI 13.50 cm    AV valve area 2.37 cm²    AV Velocity Ratio 0.62     AV index (prosthetic) 0.68     ZENIA by Velocity Ratio 2.15 cm²    Mr max guru 6.01 m/s    MV stenosis pressure 1/2 time 29.71 ms    MV valve area p 1/2 method 7.40 cm2    TV mean gradient 28 mmHg    Triscuspid Valve Regurgitation Peak Gradient 30 mmHg    PV mean gradient 1 mmHg    RVOT peak guru 0.57 m/s    Ao root annulus 3.73 cm    STJ 3.46 cm    Ascending aorta 3.42 cm    IVC diameter 2.33 cm    Mean e' 0.10 m/s    ZLVIDS -3.39     ZLVIDD -7.76     LA Volume Index 59.0 mL/m2    LA volume 145.24 cm3    LA WIDTH 5.0 cm    RA Width 3.7 cm    TV resting pulmonary artery pressure 45 mmHg    RV TB RVSP 18 mmHg    Est. RA pres 15 mmHg    Narrative      Left Ventricle: The left ventricle is moderately dilated. Mildly   increased wall thickness. There is severely reduced systolic function with   a visually estimated ejection fraction of 20 - 25%. Grade II diastolic   dysfunction.    Right Ventricle: Normal right ventricular cavity size. Wall thickness   is normal. Right ventricle wall motion  is normal. Systolic function is   moderately reduced.    Left Atrium: Left atrium is severely dilated.    Aortic Valve: There is moderate aortic valve sclerosis. There is  mild   aortic regurgitation.    Mitral Valve: There is severe regurgitation.    Tricuspid Valve: There is mild regurgitation.    Pulmonary Artery: The estimated pulmonary artery systolic pressure is   45 mmHg.    IVC/SVC: Elevated venous pressure at 15 mmHg.      and EKG: Reviewed  Assessment and Plan:   Patient who presents with new onset combined CHF/cardiomyopathy. Suspect arrhythmia induced from atrial flutter. Continue OMT and IV diuresis. Amiodarone added today. Plans for ischemic workup tentatively tmw pending reassessment of volume status. Will need RODERICK/DCCV as well if he fails to convert to SR.     * Acute on chronic congestive heart failure  Patient is identified as having Diastolic (HFpEF) heart failure that is Acute. CHF is currently uncontrolled due to Pulmonary edema/pleural effusion on CXR. Latest ECHO performed and demonstrates- No results found for this or any previous visit.  . Continue Furosemide and monitor clinical status closely. Monitor on telemetry. Patient is on CHF pathway.  Monitor strict Is&Os and daily weights.  Place on fluid restriction of 2 L. Cardiology has been consulted. Continue to stress to patient importance of self efficacy and  on diet for CHF. Last BNP reviewed- and noted below   Recent Labs   Lab 04/02/24  0817   *     Cw iv lasix     4/3/24  -TTE with EF of 20-25%, moderately reduced RV function, pulmonary HTN, severe MR  -Continue IV diuresis  -Continue BB  -Will add ARB vs Entresto pending creatinine/BP trend  -Strict I's/O's  -Probable R/LHC tmw pending reassessment    Atrial flutter  -Currently in aflutter HR low 100's  -Contributing to volume status/cardiomyopathy  -Continue BB  -Start amiodarone drip  -Continue heparin drip  -Will need RODERICK/DCCV post ischemic workup as well as EP evaluation to discuss ablation in future    Chest pain  Heparin iv   Trend trop   Echo   Aspirin   Will need ischemic work up prior to discharge  Cw toprol    4/3/24  -Stable,  troponin flat  -Continue ASA, BB, statin, heparin gtt  -Probable LHC tmw pending reassessment of volume status    Elevated troponin  -Flat  -Likely secondary to demand ischemia from new onset atrial flutter and combined CHF  -Continue ASA, BB, heparin gtt, statin  -Ischemic workup with [facility] once better compensated    Essential hypertension  -Continue BB  -Monitor BP trend        VTE Risk Mitigation (From admission, onward)           Ordered     heparin 25,000 units in dextrose 5% (100 units/ml) IV bolus from bag LOW INTENSITY nomogram - [facility]  As needed (PRN)        Question:  Heparin Infusion Adjustment (DO NOT MODIFY ANSWER)  Answer:  \\[email or web address] [email or web address]\Images\[email or web address]\HeparinInfusions\heparin LOW INTENSITY nomogram for [facility] [id number]    04/02/24 1109     heparin 25,000 units in dextrose 5% (100 units/ml) IV bolus from bag LOW INTENSITY nomogram - [facility]  As needed (PRN)        Question:  Heparin Infusion Adjustment (DO NOT MODIFY ANSWER)  Answer:  \[email or web address]\[email or web address]\Images\[email or web address]\HeparinInfusions\heparin LOW INTENSITY nomogram for [facility] [id number]    04/02/24 1109     Reason for No Pharmacological VTE Prophylaxis  Once        Comments: Heparin Infusion   Question:  Reasons:  Answer:  Physician Provided (leave comment)    04/02/24 1153     IP VTE HIGH RISK PATIENT  Once         04/02/24 1153     Place sequential compression device  Until discontinued         04/02/24 1153     heparin 25,000 units in dextrose 5% 250 mL (100 units/mL) infusion LOW INTENSITY nomogram - [facility]  Continuous        Question:  Begin at (units/kg/hr)  Answer:  12    04/02/24 1109                    Roxi De Leon PA-C  Cardiology  [facility] - Med S[facility] 3     Attending with

## 2024-04-03 NOTE — ASSESSMENT & PLAN NOTE
Chronic, . Latest blood pressure and vitals reviewed-     Temp:  [97.4 °F (36.3 °C)-98 °F (36.7 °C)]   Pulse:  [102-120]   Resp:  [17-20]   BP: ()/(68-80)   SpO2:  [91 %-96 %] .   Home meds for hypertension were reviewed and noted below.   Hypertension Medications               metoprolol succinate (TOPROL-XL) 25 MG 24 hr tablet Take 1 tablet (25 mg total) by mouth once daily.    torsemide (DEMADEX) 20 MG Tab Take 1 tablet (20 mg total) by mouth 2 (two) times a day.            While in the hospital, will manage blood pressure as follows; Continue home antihypertensive regimen    Will utilize p.r.n. blood pressure medication only if patient's blood pressure greater than 160/100 and he develops symptoms such as worsening chest pain or shortness of breath.

## 2024-04-03 NOTE — PLAN OF CARE
Pt awake and alert  Denies chest pain and SOB   HOB elevated   No distress   Vitals stable   HR stable   Tolerating Amiodarone drip  APPT therapeutic  IV's intact   Safety Measures intact   Chart check complete .  Will continue to monitor.

## 2024-04-03 NOTE — HOSPITAL COURSE
The patient presented with chest pain and shortness of breath. He reported he had been unable to lay flat for 2 weeks prior to presentation. He was noted to have bilateral LE edema as well. Workup revealed elevated troponin and volume overload. He was placed on IV diuretics. Cardiology was consulted. Echo revealed reduced EF. He diuresed well. Left heart cath revealed severe triple vessel disease. He underwent RODERICK/DCCV and sinus rhythm restored. The patient went for repeat LHC 4/5.  He had PCI to the mid left circ x1 in the mid LAD x1.  He developed flash pulmonary edema required noninvasive ventilation as well as diuretics.  He was placed on the balloon pump and transferred back to ICU.  Subsequently went back into AFib and had repeat cardioversion on 04/07.  Cardiac catheterization on 04/08 with 3 stents placed and balloon pump removed.  Patient has been taken off his heparin drip is feeling well able to sit in the bedside chair with no shortness a breath or chest pain.  He was deemed stable to transitioned to telemetry floor and Hospital Medicine was consulted to assume care.    Pt stable on telemetry and remained in sinus rhythm.   Pt seen and exmined on day of discharge and stable for dc.

## 2024-04-03 NOTE — SUBJECTIVE & OBJECTIVE
Interval History: f/u CHF, echo revealed EF 20-25%. Discussed need for additional workup concerned he may have blockages. He verbalized understanding. Diuresing well. Breathing better    Review of Systems  Objective:     Vital Signs (Most Recent):  Temp: 97.4 °F (36.3 °C) (04/03/24 0735)  Pulse: 110 (04/03/24 0829)  Resp: 17 (04/03/24 0735)  BP: 108/68 (04/03/24 0735)  SpO2: 95 % (04/03/24 0735) Vital Signs (24h Range):  Temp:  [97.4 °F (36.3 °C)-98 °F (36.7 °C)] 97.4 °F (36.3 °C)  Pulse:  [102-120] 110  Resp:  [17-20] 17  SpO2:  [91 %-96 %] 95 %  BP: ()/(68-80) 108/68     Weight: 123.4 kg (272 lb 0.8 oz)  Body mass index is 37.94 kg/m².    Intake/Output Summary (Last 24 hours) at 4/3/2024 0950  Last data filed at 4/3/2024 0703  Gross per 24 hour   Intake 511.81 ml   Output 4025 ml   Net -3513.19 ml         Physical Exam  HENT:      Head: Normocephalic and atraumatic.   Cardiovascular:      Rate and Rhythm: Normal rate and regular rhythm.      Heart sounds: No murmur heard.  Pulmonary:      Effort: Pulmonary effort is normal. No respiratory distress.      Breath sounds: Decreased breath sounds present. No wheezing.   Abdominal:      General: Bowel sounds are normal. There is no distension.      Palpations: Abdomen is soft.      Tenderness: There is no abdominal tenderness.   Musculoskeletal:         General: Swelling present.      Right lower leg: Edema present.      Left lower leg: Edema present.   Skin:     General: Skin is warm and dry.      Comments: Left lower leg venous stasis changes   Neurological:      Mental Status: He is alert and oriented to person, place, and time. Mental status is at baseline.             Significant Labs: All pertinent labs within the past 24 hours have been reviewed.  CBC:   Recent Labs   Lab 04/02/24  0817 04/03/24  0558   WBC 4.29 4.24   HGB 14.7 14.1   HCT 44.7 43.0    177     CMP:   Recent Labs   Lab 04/02/24  0817 04/03/24  0558    140   K 4.1 3.6    102    CO2 23 29    88   BUN 20 20   CREATININE 1.0 1.0   CALCIUM 9.2 9.4   PROT 7.5 6.9   ALBUMIN 3.2* 3.2*   BILITOT 1.1* 1.2*   ALKPHOS 59 61   AST 20 16   ALT 20 15   ANIONGAP 10 9     Coagulation:   Recent Labs   Lab 04/02/24  0817 04/02/24  1737 04/03/24  0558   INR 1.3*  --   --    APTT 29.3   < > 44.6*    < > = values in this interval not displayed.     Troponin:   Recent Labs   Lab 04/02/24  0817 04/02/24  1151 04/02/24  1737   TROPONINI 0.064* 0.054* 0.064*       Significant Imaging: I have reviewed all pertinent imaging results/findings within the past 24 hours.

## 2024-04-03 NOTE — PROGRESS NOTES
[facility]  [facility]  Progress Note    Patient Name: Chucho Clark  MRN: [id number]  Patient Class: IP- Inpatient   Admission Date: 4/2/2024  Length of Stay: 0 days  Attending Physician: Francy Bruno MD  Primary Care Provider: Ale, Primary Doctor        Subjective:     Principal Problem:Acute on chronic congestive heart failure        HPI:  78 y.o. male patient, PMHx: Diastolic HF, HTN, DVT, CAD. Presented to the Emergency Department for evaluation of CP which onset night PTA. Pt visited Dr. Corrales (Cardiology) for the first time on 4/1/24 and was initiated on Eliquis. Pt notes that he also felt some upper abd pain PTA that felt like acid reflux. Pt and son originally thought sxs were cold/flu sxs, but tested negative in the clinic. Symptoms are constant and moderate in severity. No mitigating or exacerbating factors reported. Associated sxs include SOB, palpitations, and increased HR. Patient denies any cough, numbness, HA, fever, n/v/d, and all other sxs at this time. Pt has not had a stress test. In the ED, vitals: 130/69, 121, 22, 97.8F, 95% RA. Significant Labs: BNP: 560, Trop: 0.064, Bili: 1.1. CXR: interstitial edema. EKG: Neg for STEMI. Cardiology consulted in ED. Treated with ASA, Nitro, BB, diuretic. Initiated on Heparin Infusion. Patient is a full code. Placed in observation under the care of Hospital Medicine for management of elevated troponin, ACS rule out.     Overview/Hospital Course:  The patient presented with chest pain and shortness of breath. He reported he had been unable to lay flat for 2 weeks prior to presentation. He was noted to have bilateral LE edema as well. Workup revealed elevated troponin and volume overload. He was placed on IV diuretics. Cardiology was consulted. Echo revealed reduced EF.     Interval History: f/u CHF, echo revealed EF 20-25%. Discussed need for additional workup concerned he may have blockages. He verbalized understanding. Diuresing well.  Breathing better    Review of Systems  Objective:     Vital Signs (Most Recent):  Temp: 97.4 °F (36.3 °C) (04/03/24 0735)  Pulse: 110 (04/03/24 0829)  Resp: 17 (04/03/24 0735)  BP: 108/68 (04/03/24 0735)  SpO2: 95 % (04/03/24 0735) Vital Signs (24h Range):  Temp:  [97.4 °F (36.3 °C)-98 °F (36.7 °C)] 97.4 °F (36.3 °C)  Pulse:  [102-120] 110  Resp:  [17-20] 17  SpO2:  [91 %-96 %] 95 %  BP: ()/(68-80) 108/68     Weight: 123.4 kg (272 lb 0.8 oz)  Body mass index is 37.94 kg/m².    Intake/Output Summary (Last 24 hours) at 4/3/2024 0950  Last data filed at 4/3/2024 0703  Gross per 24 hour   Intake 511.81 ml   Output 4025 ml   Net -3513.19 ml         Physical Exam  HENT:      Head: Normocephalic and atraumatic.   Cardiovascular:      Rate and Rhythm: Normal rate and regular rhythm.      Heart sounds: No murmur heard.  Pulmonary:      Effort: Pulmonary effort is normal. No respiratory distress.      Breath sounds: Decreased breath sounds present. No wheezing.   Abdominal:      General: Bowel sounds are normal. There is no distension.      Palpations: Abdomen is soft.      Tenderness: There is no abdominal tenderness.   Musculoskeletal:         General: Swelling present.      Right lower leg: Edema present.      Left lower leg: Edema present.   Skin:     General: Skin is warm and dry.      Comments: Left lower leg venous stasis changes   Neurological:      Mental Status: He is alert and oriented to person, place, and time. Mental status is at baseline.             Significant Labs: All pertinent labs within the past 24 hours have been reviewed.  CBC:   Recent Labs   Lab 04/02/24  0817 04/03/24  0558   WBC 4.29 4.24   HGB 14.7 14.1   HCT 44.7 43.0    177     CMP:   Recent Labs   Lab 04/02/24  0817 04/03/24  0558    140   K 4.1 3.6    102   CO2 23 29    88   BUN 20 20   CREATININE 1.0 1.0   CALCIUM 9.2 9.4   PROT 7.5 6.9   ALBUMIN 3.2* 3.2*   BILITOT 1.1* 1.2*   ALKPHOS 59 61   AST 20 16   ALT  20 15   ANIONGAP 10 9     Coagulation:   Recent Labs   Lab 04/02/24  0817 04/02/24  1737 04/03/24  0558   INR 1.3*  --   --    APTT 29.3   < > 44.6*    < > = values in this interval not displayed.     Troponin:   Recent Labs   Lab 04/02/24  0817 04/02/24  1151 04/02/24  1737   TROPONINI 0.064* 0.054* 0.064*       Significant Imaging: I have reviewed all pertinent imaging results/findings within the past 24 hours.    Assessment/Plan:      * Acute on chronic congestive heart failure  Patient is identified as having Combined Systolic and Diastolic heart failure that is Acute on chronic. CHF is currently uncontrolled due to >3 pillow orthopnea and Rales/crackles on pulmonary exam. Latest ECHO performed and demonstrates- Results for orders placed during the hospital encounter of 04/02/24    Echo Saline Bubble? No    Interpretation Summary    Left Ventricle: The left ventricle is moderately dilated. Mildly increased wall thickness. There is severely reduced systolic function with a visually estimated ejection fraction of 20 - 25%. Grade II diastolic dysfunction.    Right Ventricle: Normal right ventricular cavity size. Wall thickness is normal. Right ventricle wall motion  is normal. Systolic function is moderately reduced.    Left Atrium: Left atrium is severely dilated.    Aortic Valve: There is moderate aortic valve sclerosis. There is mild aortic regurgitation.    Mitral Valve: There is severe regurgitation.    Tricuspid Valve: There is mild regurgitation.    Pulmonary Artery: The estimated pulmonary artery systolic pressure is 45 mmHg.    IVC/SVC: Elevated venous pressure at 15 mmHg.  . Continue Beta Blocker and Furosemide and monitor clinical status closely. Monitor on telemetry. Patient is on CHF pathway.  Monitor strict Is&Os and daily weights.  Place on fluid restriction of 1.5 L. Cardiology has been consulted. Continue to stress to patient importance of self efficacy and  on diet for CHF. Last BNP  reviewed- and noted below   Recent Labs   Lab 04/02/24  0817   *     -continue IV diuretic  -will need ischemic workup as reduced EF new finding      Orthopnea  --Continue diuresis    Chest pain  -continue diuresis  -currently chest pain free  -will need ischemic workup      Elevated troponin    --trend trop  --Cont diuresis  --Tele  --Heparin per nomogram  --Nitro PRN chest pain  --Repeat EKG PRN  --Vitals Q4H  --NPO         Venous stasis dermatitis of both lower extremities  Chronic venous stasis. Worsened appearance of BLE, likely secondary to inability to elevate legs over the past 2 weeks due to orthopnea.     --Cont diuresis      Essential hypertension  Chronic, . Latest blood pressure and vitals reviewed-     Temp:  [97.4 °F (36.3 °C)-98 °F (36.7 °C)]   Pulse:  [102-120]   Resp:  [17-20]   BP: ()/(68-80)   SpO2:  [91 %-96 %] .   Home meds for hypertension were reviewed and noted below.   Hypertension Medications               metoprolol succinate (TOPROL-XL) 25 MG 24 hr tablet Take 1 tablet (25 mg total) by mouth once daily.    torsemide (DEMADEX) 20 MG Tab Take 1 tablet (20 mg total) by mouth 2 (two) times a day.            While in the hospital, will manage blood pressure as follows; Continue home antihypertensive regimen    Will utilize p.r.n. blood pressure medication only if patient's blood pressure greater than 160/100 and he develops symptoms such as worsening chest pain or shortness of breath.      VTE Risk Mitigation (From admission, onward)           Ordered     heparin 25,000 units in dextrose 5% (100 units/ml) IV bolus from bag LOW INTENSITY nomogram - [facility]  As needed (PRN)        Question:  Heparin Infusion Adjustment (DO NOT MODIFY ANSWER)  Answer:  [email or web address]\epic\Images\Pharmacy\HeparinInfusions\heparin LOW INTENSITY nomogram for [facility] [id number]    04/02/24 1109     heparin 25,000 units in dextrose 5% (100 units/ml) IV bolus from bag LOW INTENSITY nomogram - [facility]  As needed (PRN)         Question:  Heparin Infusion Adjustment (DO NOT MODIFY ANSWER)  Answer:  [email or web address]\epic\Images\Pharmacy\HeparinInfusions\heparin LOW INTENSITY nomogram for [facility] [id number].pdf    04/02/24 1109     Reason for No Pharmacological VTE Prophylaxis  Once        Comments: Heparin Infusion   Question:  Reasons:  Answer:  Physician Provided (leave comment)    04/02/24 1153     IP VTE HIGH RISK PATIENT  Once         04/02/24 1153     Place sequential compression device  Until discontinued         04/02/24 1153     heparin 25,000 units in dextrose 5% 250 mL (100 units/mL) infusion LOW INTENSITY nomogram - [facility]  Continuous        Question:  Begin at (units/kg/hr)  Answer:  12    04/02/24 1109                    Discharge Planning   ZOEY:      Code Status: Full Code   Is the patient medically ready for discharge?:     Reason for patient still in hospital (select all that apply): Patient trending condition, Treatment, and Consult recommendations                     Francy Bruno MD  Department of Hospital Medicine   [facility]

## 2024-04-03 NOTE — PROGRESS NOTES
"[facility]) nurse navigator notified of H[facility] candidate in need of education and introduction to 4-6 week program.      PT aao x 3 while sitting in bedside chair. Introduced self to pt as H[facility] nurse navigator.     Patient given "Home Care Guide for Heart Failure Patients" , "[facility]" flyer and "Daily weight and symptom tracker".  Encouraged pt to review information.      Reviewed the following key points of H[facility] program with pt and family:   1.) Take your medications as directed.    2.) Weight yourself daily   3.) Follow low salt and limited fluid diet.    4.) Stop smoking and start exercising   5.) Go to your appointments and call your team.      Pt reminded to follow Symptom tracker and to call at the onset of symptoms according to tracker.     Reviewed plan for follow up once discharged to include phone calls, in person and virtual visits to assist pt optimizing their heart failure medication regimen and encouraging healthy lifestyle modifications.  Reminded pt that program will assist them over the next 4-6 weeks and then patient will be transferred to long term care provider .  Reminded pt how to contact H[facility] navigator via phone and or via Ide[employer].     Pt given appointment or instructed appointment will be printed on hospital discharge paperwork.     Pt also reminded HF nurse will call 48-72 hours after discharge to check on them.     PT verbalize read back of information given.  Encouraged pt and family to read over information often and contact team with any questions or concerns.     Also spoke with pts gracia Horn on the phone. Reviewed HF education, and hospital follow up plans for appt with [facility].       "

## 2024-04-03 NOTE — ASSESSMENT & PLAN NOTE
--trend trop  --Cont diuresis  --Tele  --Heparin per nomogram  --Nitro PRN chest pain  --Repeat EKG PRN  --Vitals Q4H  --NPO

## 2024-04-03 NOTE — PLAN OF CARE
[facility]  Initial Discharge Assessment       Primary Care Provider: No, Primary Doctor    Admission Diagnosis: Elevated troponin [R79.89]  Chest pain [R07.9]  Acute on chronic congestive heart failure, unspecified heart failure type [I50.9]    Admission Date: 4/2/2024  Expected Discharge Date:     Transition of Care Barriers: None    Payor: MEDICARE / Plan: MEDICARE PART A & B / Product Type: Government /     Extended Emergency Contact Information  Primary Emergency Contact: Justina Clark   [place]  Mobile Phone: [phone]  Relation: Son    Discharge Plan A: Home with family         [id number] - [place] - [address]  [address] [place] [address]  Phone: [phone] Fax: [phone]      Initial Assessment (most recent)       Adult Discharge Assessment - 04/03/24 1431          Discharge Assessment    Assessment Type Discharge Planning Assessment     Confirmed/corrected address, phone number and insurance Yes     Confirmed Demographics Correct on Facesheet     Source of Information patient;family     Communicated ZOEY with patient/caregiver Date not available/Unable to determine     Reason For Admission CHF     People in Home alone     Facility Arrived From: home     Do you expect to return to your current living situation? Yes     Do you have help at home or someone to help you manage your care at home? Yes     Who are your caregiver(s) and their phone number(s)? son     Prior to hospitilization cognitive status: Alert/Oriented     Current cognitive status: Alert/Oriented     Walking or Climbing Stairs Difficulty no     Dressing/Bathing Difficulty no     Equipment Currently Used at Home none     Readmission within 30 days? No     Patient currently being followed by outpatient case management? No     Do you currently have service(s) that help you manage your care at home? No     Do you take prescription medications? Yes     Do you have  prescription coverage? Yes     Do you have any problems affording any of your prescribed medications? TBD     Is the patient taking medications as prescribed? yes     Who is going to help you get home at discharge? son     How do you get to doctors appointments? car, drives self     Are you on dialysis? No     Discharge Plan A Home with family     DME Needed Upon Discharge  none     Discharge Plan discussed with: Patient     Transition of Care Barriers None                      Anticipated DC Dispo: home  Prior Level of Function: independent, drives denies DME or HHC  PCP: none  Comments:  CM met with patient/son at bedside to introduce role and discuss d/c planning. Patient is independent, lives alone. Son checks on patient. Patient states he plans to follow up with Cardiology LISBETH Garcia at discharge. CM following.

## 2024-04-04 ENCOUNTER — ANESTHESIA (OUTPATIENT)
Dept: CARDIOLOGY | Facility: HOSPITAL | Age: 79
DRG: 270 | End: 2024-04-04
Payer: MEDICARE

## 2024-04-04 ENCOUNTER — ANESTHESIA EVENT (OUTPATIENT)
Dept: CARDIOLOGY | Facility: HOSPITAL | Age: 79
DRG: 270 | End: 2024-04-04
Payer: MEDICARE

## 2024-04-04 LAB
ALBUMIN SERPL BCP-MCNC: 3.2 G/DL (ref 3.5–5.2)
ALLENS TEST: ABNORMAL
ALP SERPL-CCNC: 58 U/L (ref 55–135)
ALT SERPL W/O P-5'-P-CCNC: 15 U/L (ref 10–44)
ANION GAP SERPL CALC-SCNC: 10 MMOL/L (ref 8–16)
ANION GAP SERPL CALC-SCNC: 11 MMOL/L (ref 8–16)
APTT PPP: 40.3 SEC (ref 21–32)
AST SERPL-CCNC: 17 U/L (ref 10–40)
BASOPHILS # BLD AUTO: 0.02 K/UL (ref 0–0.2)
BASOPHILS NFR BLD: 0.5 % (ref 0–1.9)
BILIRUB SERPL-MCNC: 0.9 MG/DL (ref 0.1–1)
BSA FOR ECHO PROCEDURE: 2.55 M2
BUN SERPL-MCNC: 23 MG/DL (ref 8–23)
BUN SERPL-MCNC: 29 MG/DL (ref 8–23)
CALCIUM SERPL-MCNC: 8.7 MG/DL (ref 8.7–10.5)
CALCIUM SERPL-MCNC: 9.3 MG/DL (ref 8.7–10.5)
CATH EF QUANTITATIVE: 30 %
CHLORIDE SERPL-SCNC: 100 MMOL/L (ref 95–110)
CHLORIDE SERPL-SCNC: 97 MMOL/L (ref 95–110)
CO2 SERPL-SCNC: 26 MMOL/L (ref 23–29)
CO2 SERPL-SCNC: 32 MMOL/L (ref 23–29)
CREAT SERPL-MCNC: 1.2 MG/DL (ref 0.5–1.4)
CREAT SERPL-MCNC: 1.4 MG/DL (ref 0.5–1.4)
DELSYS: ABNORMAL
DIFFERENTIAL METHOD BLD: ABNORMAL
EOSINOPHIL # BLD AUTO: 0.1 K/UL (ref 0–0.5)
EOSINOPHIL NFR BLD: 1.6 % (ref 0–8)
ERYTHROCYTE [DISTWIDTH] IN BLOOD BY AUTOMATED COUNT: 13.4 % (ref 11.5–14.5)
EST. GFR  (NO RACE VARIABLE): 51 ML/MIN/1.73 M^2
EST. GFR  (NO RACE VARIABLE): >60 ML/MIN/1.73 M^2
GLUCOSE SERPL-MCNC: 88 MG/DL (ref 70–110)
GLUCOSE SERPL-MCNC: 91 MG/DL (ref 70–110)
HCO3 UR-SCNC: 32.5 MMOL/L (ref 24–28)
HCT VFR BLD AUTO: 41.8 % (ref 40–54)
HGB BLD-MCNC: 13.8 G/DL (ref 14–18)
IMM GRANULOCYTES # BLD AUTO: 0.01 K/UL (ref 0–0.04)
IMM GRANULOCYTES NFR BLD AUTO: 0.2 % (ref 0–0.5)
LYMPHOCYTES # BLD AUTO: 1.2 K/UL (ref 1–4.8)
LYMPHOCYTES NFR BLD: 27.1 % (ref 18–48)
MAGNESIUM SERPL-MCNC: 1.8 MG/DL (ref 1.6–2.6)
MCH RBC QN AUTO: 32 PG (ref 27–31)
MCHC RBC AUTO-ENTMCNC: 33 G/DL (ref 32–36)
MCV RBC AUTO: 97 FL (ref 82–98)
MODE: ABNORMAL
MONOCYTES # BLD AUTO: 0.5 K/UL (ref 0.3–1)
MONOCYTES NFR BLD: 12.1 % (ref 4–15)
NEUTROPHILS # BLD AUTO: 2.5 K/UL (ref 1.8–7.7)
NEUTROPHILS NFR BLD: 58.5 % (ref 38–73)
NRBC BLD-RTO: 0 /100 WBC
OHS QRS DURATION: 142 MS
OHS QTC CALCULATION: 464 MS
PCO2 BLDA: 50.3 MMHG
PH SMN: 7.42 [PH] (ref 7.35–7.45)
PHOSPHATE SERPL-MCNC: 4.2 MG/DL (ref 2.7–4.5)
PISA MRMAX VEL: 4.74 M/S
PLATELET # BLD AUTO: 179 K/UL (ref 150–450)
PMV BLD AUTO: 10.7 FL (ref 9.2–12.9)
PO2 BLDA: 34 MMHG (ref 80–100)
POC BE: 8 MMOL/L
POC SATURATED O2: 65 %
POTASSIUM SERPL-SCNC: 3.2 MMOL/L (ref 3.5–5.1)
POTASSIUM SERPL-SCNC: 3.8 MMOL/L (ref 3.5–5.1)
PROT SERPL-MCNC: 6.7 G/DL (ref 6–8.4)
RA PRESSURE ESTIMATED: 8 MMHG
RBC # BLD AUTO: 4.31 M/UL (ref 4.6–6.2)
SAMPLE: ABNORMAL
SITE: ABNORMAL
SODIUM SERPL-SCNC: 136 MMOL/L (ref 136–145)
SODIUM SERPL-SCNC: 140 MMOL/L (ref 136–145)
WBC # BLD AUTO: 4.28 K/UL (ref 3.9–12.7)

## 2024-04-04 PROCEDURE — 85730 THROMBOPLASTIN TIME PARTIAL: CPT | Performed by: EMERGENCY MEDICINE

## 2024-04-04 PROCEDURE — 37000009 HC ANESTHESIA EA ADD 15 MINS: Performed by: INTERNAL MEDICINE

## 2024-04-04 PROCEDURE — 25000003 PHARM REV CODE 250: Performed by: STUDENT IN AN ORGANIZED HEALTH CARE EDUCATION/TRAINING PROGRAM

## 2024-04-04 PROCEDURE — 25000003 PHARM REV CODE 250: Performed by: INTERNAL MEDICINE

## 2024-04-04 PROCEDURE — C1894 INTRO/SHEATH, NON-LASER: HCPCS | Performed by: INTERNAL MEDICINE

## 2024-04-04 PROCEDURE — 5A2204Z RESTORATION OF CARDIAC RHYTHM, SINGLE: ICD-10-PCS | Performed by: INTERNAL MEDICINE

## 2024-04-04 PROCEDURE — 99153 MOD SED SAME PHYS/QHP EA: CPT | Performed by: INTERNAL MEDICINE

## 2024-04-04 PROCEDURE — 63600175 PHARM REV CODE 636 W HCPCS: Performed by: STUDENT IN AN ORGANIZED HEALTH CARE EDUCATION/TRAINING PROGRAM

## 2024-04-04 PROCEDURE — 99233 SBSQ HOSP IP/OBS HIGH 50: CPT | Mod: 25,,, | Performed by: INTERNAL MEDICINE

## 2024-04-04 PROCEDURE — G0278 ILIAC ART ANGIO,CARDIAC CATH: HCPCS | Performed by: INTERNAL MEDICINE

## 2024-04-04 PROCEDURE — 36415 COLL VENOUS BLD VENIPUNCTURE: CPT | Performed by: INTERNAL MEDICINE

## 2024-04-04 PROCEDURE — 99900035 HC TECH TIME PER 15 MIN (STAT)

## 2024-04-04 PROCEDURE — 63600175 PHARM REV CODE 636 W HCPCS: Performed by: INTERNAL MEDICINE

## 2024-04-04 PROCEDURE — 93010 ELECTROCARDIOGRAM REPORT: CPT | Mod: 59,,, | Performed by: INTERNAL MEDICINE

## 2024-04-04 PROCEDURE — 25500020 PHARM REV CODE 255: Performed by: INTERNAL MEDICINE

## 2024-04-04 PROCEDURE — 63600175 PHARM REV CODE 636 W HCPCS: Performed by: EMERGENCY MEDICINE

## 2024-04-04 PROCEDURE — 93005 ELECTROCARDIOGRAM TRACING: CPT

## 2024-04-04 PROCEDURE — 25000003 PHARM REV CODE 250: Performed by: PHYSICIAN ASSISTANT

## 2024-04-04 PROCEDURE — G0278 ILIAC ART ANGIO,CARDIAC CATH: HCPCS | Mod: ,,, | Performed by: INTERNAL MEDICINE

## 2024-04-04 PROCEDURE — 99152 MOD SED SAME PHYS/QHP 5/>YRS: CPT | Mod: ,,, | Performed by: INTERNAL MEDICINE

## 2024-04-04 PROCEDURE — 36415 COLL VENOUS BLD VENIPUNCTURE: CPT | Performed by: EMERGENCY MEDICINE

## 2024-04-04 PROCEDURE — 85025 COMPLETE CBC W/AUTO DIFF WBC: CPT | Performed by: EMERGENCY MEDICINE

## 2024-04-04 PROCEDURE — 83735 ASSAY OF MAGNESIUM: CPT | Performed by: NURSE PRACTITIONER

## 2024-04-04 PROCEDURE — 25000003 PHARM REV CODE 250: Performed by: NURSE PRACTITIONER

## 2024-04-04 PROCEDURE — 93461 R&L HRT ART/VENTRICLE ANGIO: CPT | Performed by: INTERNAL MEDICINE

## 2024-04-04 PROCEDURE — 99152 MOD SED SAME PHYS/QHP 5/>YRS: CPT | Performed by: INTERNAL MEDICINE

## 2024-04-04 PROCEDURE — 84100 ASSAY OF PHOSPHORUS: CPT | Performed by: NURSE PRACTITIONER

## 2024-04-04 PROCEDURE — 93461 R&L HRT ART/VENTRICLE ANGIO: CPT | Mod: 26,,, | Performed by: INTERNAL MEDICINE

## 2024-04-04 PROCEDURE — 80048 BASIC METABOLIC PNL TOTAL CA: CPT | Mod: XB | Performed by: INTERNAL MEDICINE

## 2024-04-04 PROCEDURE — C1751 CATH, INF, PER/CENT/MIDLINE: HCPCS | Performed by: INTERNAL MEDICINE

## 2024-04-04 PROCEDURE — 93010 ELECTROCARDIOGRAM REPORT: CPT | Mod: XE,76,, | Performed by: INTERNAL MEDICINE

## 2024-04-04 PROCEDURE — 93459 L HRT ART/GRFT ANGIO: CPT | Performed by: INTERNAL MEDICINE

## 2024-04-04 PROCEDURE — C1769 GUIDE WIRE: HCPCS | Performed by: INTERNAL MEDICINE

## 2024-04-04 PROCEDURE — B41D1ZZ FLUOROSCOPY OF AORTA AND BILATERAL LOWER EXTREMITY ARTERIES USING LOW OSMOLAR CONTRAST: ICD-10-PCS | Performed by: INTERNAL MEDICINE

## 2024-04-04 PROCEDURE — 4A023N8 MEASUREMENT OF CARDIAC SAMPLING AND PRESSURE, BILATERAL, PERCUTANEOUS APPROACH: ICD-10-PCS | Performed by: INTERNAL MEDICINE

## 2024-04-04 PROCEDURE — 80053 COMPREHEN METABOLIC PANEL: CPT | Performed by: NURSE PRACTITIONER

## 2024-04-04 PROCEDURE — 37799 UNLISTED PX VASCULAR SURGERY: CPT

## 2024-04-04 PROCEDURE — 82803 BLOOD GASES ANY COMBINATION: CPT

## 2024-04-04 PROCEDURE — B3121ZZ FLUOROSCOPY OF LEFT SUBCLAVIAN ARTERY USING LOW OSMOLAR CONTRAST: ICD-10-PCS | Performed by: INTERNAL MEDICINE

## 2024-04-04 PROCEDURE — 37000008 HC ANESTHESIA 1ST 15 MINUTES: Performed by: INTERNAL MEDICINE

## 2024-04-04 PROCEDURE — 63600175 PHARM REV CODE 636 W HCPCS: Performed by: PHYSICIAN ASSISTANT

## 2024-04-04 PROCEDURE — 11000001 HC ACUTE MED/SURG PRIVATE ROOM

## 2024-04-04 PROCEDURE — B2111ZZ FLUOROSCOPY OF MULTIPLE CORONARY ARTERIES USING LOW OSMOLAR CONTRAST: ICD-10-PCS | Performed by: INTERNAL MEDICINE

## 2024-04-04 RX ORDER — LIDOCAINE HYDROCHLORIDE 10 MG/ML
INJECTION, SOLUTION EPIDURAL; INFILTRATION; INTRACAUDAL; PERINEURAL
Status: DISCONTINUED | OUTPATIENT
Start: 2024-04-04 | End: 2024-04-04

## 2024-04-04 RX ORDER — NITROGLYCERIN 5 MG/ML
INJECTION, SOLUTION INTRAVENOUS
Status: DISCONTINUED | OUTPATIENT
Start: 2024-04-04 | End: 2024-04-04 | Stop reason: HOSPADM

## 2024-04-04 RX ORDER — LIDOCAINE HYDROCHLORIDE 20 MG/ML
INJECTION, SOLUTION EPIDURAL; INFILTRATION; INTRACAUDAL; PERINEURAL
Status: DISCONTINUED | OUTPATIENT
Start: 2024-04-04 | End: 2024-04-04 | Stop reason: HOSPADM

## 2024-04-04 RX ORDER — SPIRONOLACTONE 25 MG/1
25 TABLET ORAL DAILY
Status: DISCONTINUED | OUTPATIENT
Start: 2024-04-04 | End: 2024-04-05

## 2024-04-04 RX ORDER — FUROSEMIDE 20 MG/1
20 TABLET ORAL DAILY
Status: DISCONTINUED | OUTPATIENT
Start: 2024-04-04 | End: 2024-04-05

## 2024-04-04 RX ORDER — SODIUM CHLORIDE 9 MG/ML
INJECTION, SOLUTION INTRAVENOUS CONTINUOUS
Status: DISCONTINUED | OUTPATIENT
Start: 2024-04-04 | End: 2024-04-04

## 2024-04-04 RX ORDER — FENTANYL CITRATE 50 UG/ML
INJECTION, SOLUTION INTRAMUSCULAR; INTRAVENOUS
Status: DISCONTINUED | OUTPATIENT
Start: 2024-04-04 | End: 2024-04-04 | Stop reason: HOSPADM

## 2024-04-04 RX ORDER — ONDANSETRON 8 MG/1
8 TABLET, ORALLY DISINTEGRATING ORAL EVERY 8 HOURS PRN
Status: DISCONTINUED | OUTPATIENT
Start: 2024-04-04 | End: 2024-04-10

## 2024-04-04 RX ORDER — HEPARIN SODIUM 1000 [USP'U]/ML
INJECTION INTRAVENOUS; SUBCUTANEOUS
Status: DISCONTINUED | OUTPATIENT
Start: 2024-04-04 | End: 2024-04-04 | Stop reason: HOSPADM

## 2024-04-04 RX ORDER — EPHEDRINE SULFATE 50 MG/ML
INJECTION, SOLUTION INTRAVENOUS
Status: DISCONTINUED | OUTPATIENT
Start: 2024-04-04 | End: 2024-04-04

## 2024-04-04 RX ORDER — SODIUM CHLORIDE 9 MG/ML
INJECTION, SOLUTION INTRAVENOUS CONTINUOUS
Status: ACTIVE | OUTPATIENT
Start: 2024-04-04 | End: 2024-04-04

## 2024-04-04 RX ORDER — VERAPAMIL HYDROCHLORIDE 2.5 MG/ML
INJECTION, SOLUTION INTRAVENOUS
Status: DISCONTINUED | OUTPATIENT
Start: 2024-04-04 | End: 2024-04-04 | Stop reason: HOSPADM

## 2024-04-04 RX ORDER — ACETAMINOPHEN 325 MG/1
650 TABLET ORAL EVERY 4 HOURS PRN
Status: DISCONTINUED | OUTPATIENT
Start: 2024-04-04 | End: 2024-04-04

## 2024-04-04 RX ORDER — CEFAZOLIN SODIUM 1 G/3ML
INJECTION, POWDER, FOR SOLUTION INTRAMUSCULAR; INTRAVENOUS
Status: DISCONTINUED | OUTPATIENT
Start: 2024-04-04 | End: 2024-04-04 | Stop reason: HOSPADM

## 2024-04-04 RX ORDER — MIDAZOLAM HYDROCHLORIDE 1 MG/ML
INJECTION, SOLUTION INTRAMUSCULAR; INTRAVENOUS
Status: DISCONTINUED | OUTPATIENT
Start: 2024-04-04 | End: 2024-04-04 | Stop reason: HOSPADM

## 2024-04-04 RX ORDER — POTASSIUM CHLORIDE 20 MEQ/1
40 TABLET, EXTENDED RELEASE ORAL ONCE
Status: COMPLETED | OUTPATIENT
Start: 2024-04-04 | End: 2024-04-04

## 2024-04-04 RX ORDER — PROPOFOL 10 MG/ML
VIAL (ML) INTRAVENOUS
Status: DISCONTINUED | OUTPATIENT
Start: 2024-04-04 | End: 2024-04-04

## 2024-04-04 RX ORDER — DIPHENHYDRAMINE HYDROCHLORIDE 50 MG/ML
INJECTION INTRAMUSCULAR; INTRAVENOUS
Status: DISCONTINUED | OUTPATIENT
Start: 2024-04-04 | End: 2024-04-04 | Stop reason: HOSPADM

## 2024-04-04 RX ORDER — PHENYLEPHRINE HYDROCHLORIDE 10 MG/ML
INJECTION INTRAVENOUS
Status: DISCONTINUED | OUTPATIENT
Start: 2024-04-04 | End: 2024-04-04

## 2024-04-04 RX ADMIN — ATORVASTATIN CALCIUM 40 MG: 40 TABLET, FILM COATED ORAL at 09:04

## 2024-04-04 RX ADMIN — AMIODARONE HYDROCHLORIDE 0.5 MG/MIN: 1.8 INJECTION, SOLUTION INTRAVENOUS at 08:04

## 2024-04-04 RX ADMIN — SODIUM CHLORIDE: 9 INJECTION, SOLUTION INTRAVENOUS at 02:04

## 2024-04-04 RX ADMIN — GLYCOPYRROLATE 0.2 MG: 0.2 INJECTION, SOLUTION INTRAMUSCULAR; INTRAVITREAL at 02:04

## 2024-04-04 RX ADMIN — HEPARIN SODIUM 12 UNITS/KG/HR: 10000 INJECTION, SOLUTION INTRAVENOUS at 07:04

## 2024-04-04 RX ADMIN — PHENYLEPHRINE HYDROCHLORIDE 100 MCG: 10 INJECTION INTRAVENOUS at 02:04

## 2024-04-04 RX ADMIN — SODIUM CHLORIDE: 9 INJECTION, SOLUTION INTRAVENOUS at 10:04

## 2024-04-04 RX ADMIN — POTASSIUM CHLORIDE 40 MEQ: 1500 TABLET, EXTENDED RELEASE ORAL at 09:04

## 2024-04-04 RX ADMIN — LIDOCAINE HYDROCHLORIDE 100 MG: 10 SOLUTION INTRAVENOUS at 02:04

## 2024-04-04 RX ADMIN — Medication 50 MG: at 02:04

## 2024-04-04 RX ADMIN — EPHEDRINE SULFATE 10 MG: 50 INJECTION INTRAVENOUS at 02:04

## 2024-04-04 RX ADMIN — AMIODARONE HYDROCHLORIDE 0.5 MG/MIN: 1.8 INJECTION, SOLUTION INTRAVENOUS at 07:04

## 2024-04-04 RX ADMIN — SPIRONOLACTONE 25 MG: 25 TABLET, FILM COATED ORAL at 08:04

## 2024-04-04 RX ADMIN — METOPROLOL SUCCINATE 25 MG: 25 TABLET, EXTENDED RELEASE ORAL at 09:04

## 2024-04-04 RX ADMIN — ASPIRIN 81 MG: 81 TABLET, COATED ORAL at 09:04

## 2024-04-04 RX ADMIN — FUROSEMIDE 20 MG: 20 TABLET ORAL at 08:04

## 2024-04-04 NOTE — PROGRESS NOTES
[facility]  Cardiology  Progress Note    Patient Name: Chucho Clark  MRN: [id number]  Admission Date: 4/2/2024  Hospital Length of Stay: 1 days  Code Status: Full Code   Attending Physician: Francy Bruno MD   Primary Care Physician: Ale, Primary Doctor  Expected Discharge Date:   Principal Problem:Acute on chronic congestive heart failure    Subjective:     Hospital Course:   4/3/24-Patient seen and examined today, resting in bed. Feeling better. Less SOB, diuresed well overnight. Still has significant BLE edema. Labs reviewed/stable. EKG with aflutter. TTE with EF of 20-25%, decreased RV function, severe MR. Troponin flat. Discussed ischemic workup possibly tmw pending clinical condition.     4.4.2024  Still in aflutter, swelling significantly improved   In bed, laying comfortably      Review of Systems   Cardiovascular:  Positive for leg swelling. Negative for chest pain, dyspnea on exertion, palpitations and syncope.   Genitourinary: Negative.    Neurological: Negative.      Objective:     Vital Signs (Most Recent):  Temp: 97.7 °F (36.5 °C) (04/04/24 0824)  Pulse: 104 (04/04/24 0857)  Resp: 17 (04/04/24 0824)  BP: 105/66 (04/04/24 0824)  SpO2: (!) 93 % (04/04/24 0824) Vital Signs (24h Range):  Temp:  [97.4 °F (36.3 °C)-98.6 °F (37 °C)] 97.7 °F (36.5 °C)  Pulse:  [] 104  Resp:  [17-20] 17  SpO2:  [89 %-96 %] 93 %  BP: ()/(53-66) 105/66     Weight: 123.4 kg (272 lb 0.8 oz)  Body mass index is 37.94 kg/m².     SpO2: (!) 93 %         Intake/Output Summary (Last 24 hours) at 4/4/2024 1054  Last data filed at 4/3/2024 2242  Gross per 24 hour   Intake 228.11 ml   Output 2175 ml   Net -1946.89 ml       Lines/Drains/Airways       Peripheral Intravenous Line  Duration                  Peripheral IV - Single Lumen 04/02/24 0813 20 G Posterior;Right Forearm 2 days         Peripheral IV - Single Lumen 04/03/24 1700 22 G Posterior;Right Hand <1 day                       Physical Exam  Vitals reviewed.    Constitutional:       Appearance: He is well-developed.   Neck:      Vascular: No carotid bruit.   Cardiovascular:      Rate and Rhythm: Tachycardia present. Rhythm irregular.      Pulses: Intact distal pulses.      Heart sounds: Normal heart sounds. No murmur heard.  Pulmonary:      Breath sounds: Normal breath sounds.   Neurological:      Mental Status: He is oriented to person, place, and time.            Significant Labs: All pertinent lab results from the last 24 hours have been reviewed. and   Recent Lab Results         04/04/24  0520   04/03/24  1631   04/03/24  1335        Albumin 3.2           ALP 58           ALT 15           Anion Gap 11           PTT 40.3  Comment: Refer to local heparin nomogram for intensity/dose specific   therapeutic   range.             AST 17           Baso # 0.02           Basophil % 0.5           BILIRUBIN TOTAL 0.9  Comment: For infants and newborns, interpretation of results should be based  on gestational age, weight and in agreement with clinical  observations.    Premature Infant recommended reference ranges:  Up to 24 hours.............<8.0 mg/dL  Up to 48 hours............<12.0 mg/dL  3-5 days..................<15.0 mg/dL  6-29 days.................<15.0 mg/dL             BUN 29           Calcium 9.3           Chloride 97           CO2 32           Creatinine 1.4           Differential Method Automated           eGFR 51           Eos # 0.1           Eos % 1.6           Glucose 88           Gran # (ANC) 2.5           Gran % 58.5           Hematocrit 41.8           Hemoglobin 13.8           Immature Grans (Abs) 0.01  Comment: Mild elevation in immature granulocytes is non specific and   can be seen in a variety of conditions including stress response,   acute inflammation, trauma and pregnancy. Correlation with other   laboratory and clinical findings is essential.             Immature Granulocytes 0.2           Lymph # 1.2           Lymph % 27.1           Magnesium  1.8            MCH 32.0           MCHC 33.0           MCV 97           Mono # 0.5           Mono % 12.1           MPV 10.7           nRBC 0           QRS Duration     142       [facility] QTC Calculation     510       Phosphorus Level 4.2           Platelet Count 179           POCT Glucose   107         Potassium 3.2           PROTEIN TOTAL 6.7           RBC 4.31           RDW 13.4           Sodium 140           WBC 4.28                   Significant Imaging: Echocardiogram: Transthoracic echo (TTE) complete (Cupid Only):   Results for orders placed or performed during the hospital encounter of 04/02/24   Echo Saline Bubble? No   Result Value Ref Range    BSA 2.55 m2    LVOT stroke volume 47.18 cm3    LVIDd 5.79 3.5 - 6.0 cm    LV Systolic Volume 112.00 mL    LV Systolic Volume Index 45.5 mL/m2    LVIDs 4.88 (A) 2.1 - 4.0 cm    LV Diastolic Volume 165.91 mL    LV Diastolic Volume Index 67.44 mL/m2    IVS 1.29 (A) 0.6 - 1.1 cm    LVOT diameter 2.11 cm    LVOT area 3.5 cm2    FS 16 (A) 28 - 44 %    Left Ventricle Relative Wall Thickness 0.35 cm    Posterior Wall 1.02 0.6 - 1.1 cm    LV mass 281.26 g    LV Mass Index 114 g/m2    MV Peak E Guru 1.27 m/s    TDI LATERAL 0.11 m/s    TDI SEPTAL 0.09 m/s    E/E' ratio 12.70 m/s    MV Peak A Guru 1.01 m/s    TR Max Guru 2.74 m/s    E/A ratio 1.26     IVRT 53.28 msec    E wave deceleration time 102.45 msec    LV SEPTAL E/E' RATIO 14.11 m/s    LV LATERAL E/E' RATIO 11.55 m/s    LVOT peak guru 0.88 m/s    Left Ventricular Outflow Tract Mean Velocity 0.76 cm/s    Left Ventricular Outflow Tract Mean Gradient 2.34 mmHg    RVDD 3.32 cm    RVOT peak VTI 7.4 cm    TAPSE 1.17 cm    LA size 4.81 cm    Left Atrium Minor Axis 7.08 cm    Left Atrium Major Axis 7.13 cm    RA Major Axis 6.17 cm    AV regurgitation pressure 1/2 time [id number] ms    AR Max Guru 2.74 m/s    AV mean gradient 5 mmHg    AV peak gradient 8 mmHg    Ao peak guru 1.43 m/s    Ao VTI 19.90 cm    LVOT peak VTI 13.50 cm    AV valve  area 2.37 cm²    AV Velocity Ratio 0.62     AV index (prosthetic) 0.68     ZENIA by Velocity Ratio 2.15 cm²    Mr max jay 6.01 m/s    MV stenosis pressure 1/2 time 29.71 ms    MV valve area p 1/2 method 7.40 cm2    TV mean gradient 28 mmHg    Triscuspid Valve Regurgitation Peak Gradient 30 mmHg    PV mean gradient 1 mmHg    RVOT peak jay 0.57 m/s    Ao root annulus 3.73 cm    STJ 3.46 cm    Ascending aorta 3.42 cm    IVC diameter 2.33 cm    Mean e' 0.10 m/s    ZLVIDS -3.39     ZLVIDD -7.76     LA Volume Index 59.0 mL/m2    LA volume 145.24 cm3    LA WIDTH 5.0 cm    RA Width 3.7 cm    TV resting pulmonary artery pressure 45 mmHg    RV TB RVSP 18 mmHg    Est. RA pres 15 mmHg    Narrative      Left Ventricle: The left ventricle is moderately dilated. Mildly   increased wall thickness. There is severely reduced systolic function with   a visually estimated ejection fraction of 20 - 25%. Grade II diastolic   dysfunction.    Right Ventricle: Normal right ventricular cavity size. Wall thickness   is normal. Right ventricle wall motion  is normal. Systolic function is   moderately reduced.    Left Atrium: Left atrium is severely dilated.    Aortic Valve: There is moderate aortic valve sclerosis. There is mild   aortic regurgitation.    Mitral Valve: There is severe regurgitation.    Tricuspid Valve: There is mild regurgitation.    Pulmonary Artery: The estimated pulmonary artery systolic pressure is   45 mmHg.    IVC/SVC: Elevated venous pressure at 15 mmHg.     EKG showing atrial flutter  Assessment and Plan:     * Acute on chronic congestive heart failure  Patient is identified as having Diastolic (HFpEF) heart failure that is Acute. CHF is currently uncontrolled due to Pulmonary edema/pleural effusion on CXR. Latest ECHO performed and demonstrates- No results found for this or any previous visit.  . Continue Furosemide and monitor clinical status closely. Monitor on telemetry. Patient is on CHF pathway.  Monitor strict  Is&Os and daily weights.  Place on fluid restriction of 2 L. Cardiology has been consulted. Continue to stress to patient importance of self efficacy and  on diet for CHF. Last BNP reviewed- and noted below   Recent Labs   Lab 04/02/24  0817   *     Cw iv lasix     4/3/24  -TTE with EF of 20-25%, moderately reduced RV function, pulmonary HTN, severe MR  -Continue IV diuresis  -Continue BB  -Will add ARB vs Entresto pending creatinine/BP trend  -Strict I's/O's  -Probable R/LHC tmw pending reassessment    4.4.2024  Right and left heart catheterization today.    Lasix held due to drop in pressure yesterday evening.    Discussed risks and benefits with the family, son and daughter at bedside.    Atrial flutter  -Currently in aflutter HR low 100's  -Contributing to volume status/cardiomyopathy  -Continue BB  -Start amiodarone drip  -Continue heparin drip  -Will need RODERICK/DCCV post ischemic workup as well as EP evaluation to discuss ablation in future    4.4.2024  RODERICK cardioversion today after heart catheterization.    Chest pain  Heparin iv   Trend trop   Echo   Aspirin   Will need ischemic work up prior to discharge  Cw toprol    4/3/24  -Stable, troponin flat  -Continue ASA, BB, statin, heparin gtt  -Probable LHC tmw pending reassessment of volume status    Elevated troponin  -Flat  -Likely secondary to demand ischemia from new onset atrial flutter and combined CHF  -Continue ASA, BB, heparin gtt, statin  -Ischemic workup with [facility] once better compensated    Essential hypertension  -Continue BB  -Monitor BP trend        VTE Risk Mitigation (From admission, onward)           Ordered     heparin 25,000 units in dextrose 5% (100 units/ml) IV bolus from bag LOW INTENSITY nomogram - [facility]  As needed (PRN)        Question:  Heparin Infusion Adjustment (DO NOT MODIFY ANSWER)  Answer:  [email or web address]\epic\Images\Pharmacy\HeparinInfusions\heparin LOW INTENSITY nomogram for [facility] [id number]    04/02/24 1109     heparin  25,000 units in dextrose 5% (100 units/ml) IV bolus from bag LOW INTENSITY nomogram - [facility]  As needed (PRN)        Question:  Heparin Infusion Adjustment (DO NOT MODIFY ANSWER)  Answer:  [email or web address]\epic\Images\Pharmacy\HeparinInfusions\heparin LOW INTENSITY nomogram for [facility] [id number].pdf    04/02/24 1109     Reason for No Pharmacological VTE Prophylaxis  Once        Comments: Heparin Infusion   Question:  Reasons:  Answer:  Physician Provided (leave comment)    04/02/24 1153     IP VTE HIGH RISK PATIENT  Once         04/02/24 1153     Place sequential compression device  Until discontinued         04/02/24 1153     heparin 25,000 units in dextrose 5% 250 mL (100 units/mL) infusion LOW INTENSITY nomogram - [facility]  Continuous        Question:  Begin at (units/kg/hr)  Answer:  12    04/02/24 1109                    Suzanna Hernandez MD  Cardiology  [facility]

## 2024-04-04 NOTE — BRIEF OP NOTE
[facility] Lab ([facility])  Brief Operative Note  Cardiology    SUMMARY     Surgery Date: 4/4/2024     Surgeon(s) and Role:     * Suzanna Hernandez MD - Primary    Assisting Surgeon: None    Pre-op Diagnosis:  Atrial flutter, unspecified type [I48.92]    Post-op Diagnosis: Post-Op Diagnosis Codes:     * Atrial flutter, unspecified type [I48.92]    Procedure Performed:     No results found for this or any previous visit.  Coronary angiogram   Left ventriculogram   Aortogram   LIMA/subclavian       Severe triple vessel disease,   RCA  with r-l collaterals   Mid LAD is 80-90%  , diagonal is 80% , mid circ is 80-90%   3-4+ MR     PA 65% , 49/21/30   RA 12  RV 40 12  PW could not reached with the catheter   LVEDP Was 20/21        CT surgery evaluation       Estimated Blood Loss: * No values recorded between 4/4/2024  2:09 PM and 4/4/2024  2:33 PM *         Specimens:   Specimen (24h ago, onward)      None

## 2024-04-04 NOTE — PROGRESS NOTES
[facility] - Cath Lab ([facility] Medicine  Progress Note    Patient Name: Chucho Clark  MRN: [id number]  Patient Class: IP- Inpatient   Admission Date: 4/2/2024  Length of Stay: 1 days  Attending Physician: Francy Bruno MD  Primary Care Provider: Ale, Primary Doctor        Subjective:     Principal Problem:Acute on chronic congestive heart failure        HPI:  78 y.o. male patient, PMHx: Diastolic HF, HTN, DVT, CAD. Presented to the Emergency Department for evaluation of CP which onset night PTA. Pt visited Dr. Corrales (Cardiology) for the first time on 4/1/24 and was initiated on Eliquis. Pt notes that he also felt some upper abd pain PTA that felt like acid reflux. Pt and son originally thought sxs were cold/flu sxs, but tested negative in the clinic. Symptoms are constant and moderate in severity. No mitigating or exacerbating factors reported. Associated sxs include SOB, palpitations, and increased HR. Patient denies any cough, numbness, HA, fever, n/v/d, and all other sxs at this time. Pt has not had a stress test. In the ED, vitals: 130/69, 121, 22, 97.8F, 95% RA. Significant Labs: BNP: 560, Trop: 0.064, Bili: 1.1. CXR: interstitial edema. EKG: Neg for STEMI. Cardiology consulted in ED. Treated with ASA, Nitro, BB, diuretic. Initiated on Heparin Infusion. Patient is a full code. Placed in observation under the care of Hospital Medicine for management of elevated troponin, ACS rule out.     Overview/Hospital Course:  The patient presented with chest pain and shortness of breath. He reported he had been unable to lay flat for 2 weeks prior to presentation. He was noted to have bilateral LE edema as well. Workup revealed elevated troponin and volume overload. He was placed on IV diuretics. Cardiology was consulted. Echo revealed reduced EF. He diuresed well. Cardiology plans heart cath.    Interval History: f/u CHF feeling better, diuresing well. Discussed likely heart cath.     Review of  Systems  Objective:     Vital Signs (Most Recent):  Temp: 97.5 °F (36.4 °C) (04/04/24 1131)  Pulse: (!) 112 (04/04/24 1131)  Resp: 20 (04/04/24 1131)  BP: 100/63 (04/04/24 1131)  SpO2: 95 % (04/04/24 1131) Vital Signs (24h Range):  Temp:  [97.5 °F (36.4 °C)-98.6 °F (37 °C)] 97.5 °F (36.4 °C)  Pulse:  [] 112  Resp:  [17-20] 20  SpO2:  [92 %-96 %] 95 %  BP: ()/(53-66) 100/63     Weight: 123.4 kg (272 lb 0.8 oz)  Body mass index is 37.94 kg/m².    Intake/Output Summary (Last 24 hours) at 4/4/2024 1318  Last data filed at 4/3/2024 2242  Gross per 24 hour   Intake 228.11 ml   Output 2175 ml   Net -1946.89 ml         Physical Exam  HENT:      Head: Normocephalic and atraumatic.   Cardiovascular:      Rate and Rhythm: Normal rate and regular rhythm.      Heart sounds: No murmur heard.  Pulmonary:      Effort: Pulmonary effort is normal. No respiratory distress.      Breath sounds: Normal breath sounds. No wheezing.   Abdominal:      General: Bowel sounds are normal. There is no distension.      Palpations: Abdomen is soft.      Tenderness: There is no abdominal tenderness.   Musculoskeletal:         General: Swelling present.      Right lower leg: Edema present.      Left lower leg: Edema present.   Skin:     General: Skin is warm and dry.   Neurological:      Mental Status: He is alert and oriented to person, place, and time. Mental status is at baseline.             Significant Labs: All pertinent labs within the past 24 hours have been reviewed.  CBC:   Recent Labs   Lab 04/03/24  0558 04/04/24  0520   WBC 4.24 4.28   HGB 14.1 13.8*   HCT 43.0 41.8    179     CMP:   Recent Labs   Lab 04/03/24  0558 04/04/24  0520    140   K 3.6 3.2*    97   CO2 29 32*   GLU 88 88   BUN 20 29*   CREATININE 1.0 1.4   CALCIUM 9.4 9.3   PROT 6.9 6.7   ALBUMIN 3.2* 3.2*   BILITOT 1.2* 0.9   ALKPHOS 61 58   AST 16 17   ALT 15 15   ANIONGAP 9 11       Significant Imaging: I have reviewed all pertinent imaging  results/findings within the past 24 hours.    Assessment/Plan:      * Acute on chronic congestive heart failure  Patient is identified as having Combined Systolic and Diastolic heart failure that is Acute on chronic. CHF is currently uncontrolled due to >3 pillow orthopnea and Rales/crackles on pulmonary exam. Latest ECHO performed and demonstrates- Results for orders placed during the hospital encounter of 04/02/24    Echo Saline Bubble? No    Interpretation Summary    Left Ventricle: The left ventricle is moderately dilated. Mildly increased wall thickness. There is severely reduced systolic function with a visually estimated ejection fraction of 20 - 25%. Grade II diastolic dysfunction.    Right Ventricle: Normal right ventricular cavity size. Wall thickness is normal. Right ventricle wall motion  is normal. Systolic function is moderately reduced.    Left Atrium: Left atrium is severely dilated.    Aortic Valve: There is moderate aortic valve sclerosis. There is mild aortic regurgitation.    Mitral Valve: There is severe regurgitation.    Tricuspid Valve: There is mild regurgitation.    Pulmonary Artery: The estimated pulmonary artery systolic pressure is 45 mmHg.    IVC/SVC: Elevated venous pressure at 15 mmHg.  . Continue Beta Blocker and Furosemide and monitor clinical status closely. Monitor on telemetry. Patient is on CHF pathway.  Monitor strict Is&Os and daily weights.  Place on fluid restriction of 1.5 L. Cardiology has been consulted. Continue to stress to patient importance of self efficacy and  on diet for CHF. Last BNP reviewed- and noted below   Recent Labs   Lab 04/02/24  0817   *       -continue IV diuretic  -plan ischemic workup as reduced EF new finding      Atrial flutter        Orthopnea  --Continue diuresis    Chest pain  -continue diuresis  -currently chest pain free  -will need ischemic workup      Elevated troponin  -heart cath planned  -continue heparin for now        Venous  stasis dermatitis of both lower extremities  Chronic venous stasis. Worsened appearance of BLE, likely secondary to inability to elevate legs over the past 2 weeks due to orthopnea.     --Cont diuresis      Essential hypertension  Chronic, . Latest blood pressure and vitals reviewed-     Temp:  [97.5 °F (36.4 °C)-98.6 °F (37 °C)]   Pulse:  []   Resp:  [17-20]   BP: ()/(53-66)   SpO2:  [92 %-96 %] .   Home meds for hypertension were reviewed and noted below.   Hypertension Medications               metoprolol succinate (TOPROL-XL) 25 MG 24 hr tablet Take 1 tablet (25 mg total) by mouth once daily.    torsemide (DEMADEX) 20 MG Tab Take 1 tablet (20 mg total) by mouth 2 (two) times a day.            While in the hospital, will manage blood pressure as follows; Continue home antihypertensive regimen    Will utilize p.r.n. blood pressure medication only if patient's blood pressure greater than 160/100 and he develops symptoms such as worsening chest pain or shortness of breath.      VTE Risk Mitigation (From admission, onward)           Ordered     heparin, porcine (PF) injection  As needed (PRN)         04/04/24 1232     heparin 25,000 units in dextrose 5% (100 units/ml) IV bolus from bag LOW INTENSITY nomogram - [facility]  As needed (PRN)        Question:  Heparin Infusion Adjustment (DO NOT MODIFY ANSWER)  Answer:  \\[email or web address]\epic\Images\Pharmacy\HeparinInfusions\heparin LOW INTENSITY nomogram for [facility] [id number].pdf    04/02/24 1109     heparin 25,000 units in dextrose 5% (100 units/ml) IV bolus from bag LOW INTENSITY nomogram - [facility]  As needed (PRN)        Question:  Heparin Infusion Adjustment (DO NOT MODIFY ANSWER)  Answer:  [email or web address]\epic\Images\Pharmacy\HeparinInfusions\heparin LOW INTENSITY nomogram for [facility] [id number].pdf    04/02/24 1109     Reason for No Pharmacological VTE Prophylaxis  Once        Comments: Heparin Infusion   Question:  Reasons:  Answer:  Physician Provided (leave comment)    04/02/24  1153     IP VTE HIGH RISK PATIENT  Once         04/02/24 1153     Place sequential compression device  Until discontinued         04/02/24 1153     heparin 25,000 units in dextrose 5% 250 mL (100 units/mL) infusion LOW INTENSITY nomogram - [facility]  Continuous        Question:  Begin at (units/kg/hr)  Answer:  12    04/02/24 1109                    Discharge Planning   ZOEY:      Code Status: Full Code   Is the patient medically ready for discharge?:     Reason for patient still in hospital (select all that apply): Patient trending condition, Treatment, and Consult recommendations  Discharge Plan A: Home with family                  Francy Bruno MD  Department of Hospital Medicine   [facility] ([facility])

## 2024-04-04 NOTE — TRANSFER OF CARE
"Anesthesia Transfer of Care Note    Patient: Chucho Clark    Procedure(s) Performed: Procedure(s) (LRB):  Transesophageal echo (RODERICK) intra-procedure log documentation (N/A)    Patient location: PACU    Anesthesia Type: MAC    Transport from OR: Transported from OR on room air with adequate spontaneous ventilation    Post pain: adequate analgesia    Post assessment: no apparent anesthetic complications    Post vital signs: stable    Level of consciousness: responds to stimulation and awake    Nausea/Vomiting: no nausea/vomiting    Complications: none    Transfer of care protocol was followedComments: Report given to PACU RN at bedside. Hand off tool used. RN given opportunity to ask questions or clarify concerns. No Concerns verbalized. RN was asked if ready to assume care of patient. RN verbally confirmed. Pt. left in stable condition. SV. Vital Signs Return to Near Baseline. No s/s of distress noted.     Last vitals: Visit Vitals  /63 (BP Location: Right arm, Patient Position: Sitting)   Pulse (!) 112   Temp 36.4 °C (97.5 °F) (Oral)   Resp 20   Ht 5' 11" (1.803 m)   Wt 123.4 kg (272 lb 0.8 oz)   SpO2 95%   BMI 37.94 kg/m²     "

## 2024-04-04 NOTE — ASSESSMENT & PLAN NOTE
-Currently in aflutter HR low 100's  -Contributing to volume status/cardiomyopathy  -Continue BB  -Start amiodarone drip  -Continue heparin drip  -Will need RODERICK/DCCV post ischemic workup as well as EP evaluation to discuss ablation in future    4.4.2024  RODERICK cardioversion today after heart catheterization.

## 2024-04-04 NOTE — DISCHARGE INSTRUCTIONS
"TRBand Post-op Heart Catheterization    1. DIET: It is advisable for you to follow a diet that limits the intake of salt, sugar, saturated fats and cholesterol.     2. DRIVING: Due to sedation you received during your procedure, DO NOT drive or operate machinery for 24 hours. Avoid making critical decisions or signing legal documents until tomorrow.    3. ACTIVITY: AVOID activities that require bending of the affected arm/wrist for 3 days and submerging the site in water for 3 days.   REMOVE the dressing the day after the procedure, and shower.  Wash with soap and water in hand; do not use wash cloth.  Apply a bandaid to site after shower.  No ointments, lotions, powders, colognes, ... for 5 days.  WEAR wrist immobilizer until Friday night at bedtime.  No pushing, pulling, or lifting more than 10 pounds for 3 days  No riding motorcycles, riding lawn mowers, using push mowers or weed eaters... for 5 days.  You may RESUME your normal activities or prescribed exercise program as instructed by your physician after 5 days.                                                                                                       4. WOUND CARE: It is not unusual to have a small amount of bruising to appear at or near the puncture site. It is also common to have a tender "knot" develop beneath the skin at the puncture site of the wrist/arm. This is usually scar tissue and is not a cause for concern or alarm. This tender knot may take several weeks to fully resolve. The bruise will usually spread over several days. However, if the lump gets bigger, call your doctor immediately.    5. DISCOMFORT: For general discomfort at the puncture site, you may take 1 or 2 Acetaminophen (Tylenol) tablets every 4 - 6 hours as needed. (Do not take more than 4000 mg a day)    6. SIGNS AND SYMPTOMS TO REPORT:  Call your physician immediately if any of the following occur:                                            1. Loss of feeling, warmth or " "color to the affected arm/wrist                                                                                                            2. Mild bleeding from the site                 3. Pain that is sudden, sharp or persistent in the affected arm/wrist                 4. Swelling or a change in "lump" size, increased redness or drainage at the puncture site                                                                               5. High fever (101 degrees or higher)    7. GO TO  THE EMERGENCY ROOM OR CALL 911 IF YOU HAVE: Chest pains or discomforts not relieved with 3 nitroglycerin doses (sublingual tablets or spray), numbness or severe pain of limb, if your limb becomes cold or discolored or if you develop uncontrolled bleeding from the puncture site (quickly apply firm, direct pressure above the site).  "

## 2024-04-04 NOTE — INTERVAL H&P NOTE
The patient has been examined and the H&P has been reviewed:    I concur with the findings and no changes have occurred since H&P was written.    Anesthesia/Surgery risks, benefits and alternative options discussed and understood by patient/family.          Active Hospital Problems    Diagnosis  POA    *Acute on chronic congestive heart failure [I50.9]  Yes    Atrial flutter [I48.92]  Yes    Elevated troponin [R79.89]  Yes    Chest pain [R07.9]  Yes    Orthopnea [R06.01]  Yes    Essential hypertension [I10]  Yes    Venous stasis dermatitis of both lower extremities [I87.2]  Yes      Resolved Hospital Problems   No resolved problems to display.

## 2024-04-04 NOTE — PLAN OF CARE
Pt awoke after RODERICK & ECV and remains AAOx3 at time of transfer.  Able to drink water while in CVRU without issue.   L TR Band removed per protocol; pressure dressing placed.  Dressing remains c/d/I and site wnl at time of transfer.  Discussed post op care of left wrist site with son and pt and gave both a copy of first few pages of EVS which contain post op instructions.   Transferred back to room 308, via hospital bed, in no apparent distress.   Report given to QASIM Wolf; no further questions at this time.  Son remains at bedside at time of transfer.

## 2024-04-04 NOTE — ANESTHESIA PREPROCEDURE EVALUATION
[facility] Dept[facility]  Cardiology  Consult Note    Patient Name: Chucho Clark  MRN: [id number]  Admission Date: 4/2/2024  Hospital Length of Stay: 1 days  Code Status: Full Code   Attending Provider: Francy Bruno MD   Consulting Provider: Sesar Mcghee CRNA  Primary Care Physician: Ale, Primary Doctor  Principal Problem:Acute on chronic congestive heart failure    Patient information was obtained from patient, past medical records, and ER records.     Consults  Subjective:     Chief Complaint:  CHF. NSTEMI      HPI:   78 y.o. male patient, PMHx: Diastolic HF, HTN, DVT, CAD. Presented to the Emergency Department for evaluation of CP which onset night PTA. Pt visited Dr. Corrales (Cardiology) for the first time on 4/1/24 and was initiated on Eliquis. Pt notes that he also felt some upper abd pain PTA that felt like acid reflux. Pt and son originally thought sxs were cold/flu sxs, but tested negative in the clinic. Symptoms are constant and moderate in severity. No mitigating or exacerbating factors reported. Associated sxs include SOB, palpitations, and increased HR. Patient denies any cough, numbness, HA, fever, n/v/d, and all other sxs at this time. Pt has not had a stress test. In the ED, vitals: 130/69, 121, 22, 97.8F, 95% RA. Significant Labs: BNP: 560, Trop: 0.064, Bili: 1.1. CXR: interstitial edema. EKG: Neg for STEMI. Cardiology consulted in ED. Treated with ASA, Nitro, BB, diuretic. Initiated on Heparin Infusion. Patient is a full code. Placed in observation under the care of Hospital Medicine for management of elevated troponin, ACS rule out.        Cardiology consulted      Subjective & objective note cannot be loaded without a specified hospital service.    Assessment and Plan:     Assessment & plan notes cannot be loaded without a specified hospital service.      VTE Risk Mitigation (From admission, onward)           Ordered     heparin 25,000 units in dextrose 5% (100 units/ml) IV bolus from bag  LOW INTENSITY nomogram - [facility]  As needed (PRN)        Question:  Heparin Infusion Adjustment (DO NOT MODIFY ANSWER)  Answer:  [email or web address]\Images\Pharmacy\HeparinInfusions\heparin LOW INTENSITY nomogram for [facility] [id number]    04/02/24 1109     heparin 25,000 units in dextrose 5% (100 units/ml) IV bolus from bag LOW INTENSITY nomogram - [facility]  As needed (PRN)        Question:  Heparin Infusion Adjustment (DO NOT MODIFY ANSWER)  Answer:  [email or web address]\epic\Images\Pharmacy\HeparinInfusions\heparin LOW INTENSITY nomogram for [facility] [id number]    04/02/24 1109     Reason for No Pharmacological VTE Prophylaxis  Once        Comments: Heparin Infusion   Question:  Reasons:  Answer:  Physician Provided (leave comment)    04/02/24 1153     IP VTE HIGH RISK PATIENT  Once         04/02/24 1153     Place sequential compression device  Until discontinued         04/02/24 1153     heparin 25,000 units in dextrose 5% 250 mL (100 units/mL) infusion LOW INTENSITY nomogram - [facility]  Continuous        Question:  Begin at (units/kg/hr)  Answer:  12    04/02/24 1109                    Thank you for your consult. I will follow-up with patient. Please contact us if you have any additional questions.    Sesar Mcghee, CRNA  Cardiology   [facility] - Emergency Dept.      Pre-op Assessment    I have reviewed the Patient Summary Reports.     I have reviewed the Nursing Notes. I have reviewed the NPO Status.   I have reviewed the Medications.     Review of Systems  Cardiovascular:     Hypertension   CAD       CHF Orthopnea        Functional Capacity good / => 4 METS      Shortness of Breath   Orthopnea   Coronary Artery Disease:               Congestive Heart Failure (CHF)                Hypertension     Atrial Flutter     Pulmonary:      Shortness of breath                  Renal/:  Chronic Renal Disease        Kidney Function/Disease             Neurological:    Neuromuscular Disease,                                 Neuromuscular Disease  infectious diseases progress note:    Patient is a 78y old  Male who presents with a chief complaint of sob (21 May 2019 04:46)        Angina pectoris  Atherosclerosis of native coronary artery without angina pectoris             Allergies    No Known Allergies    Intolerances        ANTIBIOTICS/RELEVANT:  antimicrobials  meropenem  IVPB 1000 milliGRAM(s) IV Intermittent every 8 hours  micafungin IVPB 100 milliGRAM(s) IV Intermittent every 24 hours  vancomycin  IVPB 1000 milliGRAM(s) IV Intermittent every 12 hours    immunologic:    OTHER:  artificial  tears Solution 1 Drop(s) Both EYES every 6 hours  calcium chloride Injectable 1000 milliGRAM(s) IV Push <User Schedule>  chlorhexidine 0.12% Liquid 15 milliLiter(s) Oral Mucosa two times a day  chlorhexidine 4% Liquid 1 Application(s) Topical <User Schedule>  dextrose 10%. 1000 milliLiter(s) IV Continuous <Continuous>  dextrose 50% Injectable 50 milliLiter(s) IV Push once  hydrocortisone sodium succinate Injectable 50 milliGRAM(s) IV Push every 6 hours  norepinephrine Infusion 0.034 MICROgram(s)/kG/Min IV Continuous <Continuous>  pantoprazole  Injectable 40 milliGRAM(s) IV Push two times a day  thiamine Injectable 200 milliGRAM(s) IV Push <User Schedule>  vasopressin Infusion 0.083 Unit(s)/Min IV Continuous <Continuous>      Objective:  Vital Signs Last 24 Hrs  T(C): 36 (21 May 2019 04:00), Max: 43 (20 May 2019 08:00)  T(F): 96.8 (21 May 2019 04:00), Max: 109.4 (20 May 2019 08:00)  HR: 76 (21 May 2019 07:00) (70 - 90)  BP: --  BP(mean): --  RR: 25 (21 May 2019 07:00) (7 - 44)  SpO2: 100% (21 May 2019 07:00) (88% - 100%)    PHYSICAL EXAM:   -   Eyes:RYLEY, EOMI  Ear/Nose/Throat: no oral lesion, no sinus tenderness on percussion	  Neck:no JVD, no lymphadenopathy, supple  Respiratory: CTA hsagufta  Cardiovascular: S1S2 RRR, no murmurs  Gastrointestinal:soft, (+) BS, no HSM  Extremities:no e/e/c        LABS:                        10.2   11.8  )-----------( 25       ( 21 May 2019 02:14 )             31.3     05-21    133<L>  |  104  |  24<H>  ----------------------------<  93  4.1   |  18<L>  |  0.52    Ca    7.8<L>      21 May 2019 02:14  Phos  3.3     05-21  Mg     2.2     05-21    TPro  5.4<L>  /  Alb  2.1<L>  /  TBili  11.2<H>  /  DBili  x   /  AST  963<H>  /  ALT  937<H>  /  AlkPhos  177<H>  05-21    PT/INR - ( 21 May 2019 02:14 )   PT: 22.3 sec;   INR: 1.90 ratio         PTT - ( 21 May 2019 02:14 )  PTT:46.3 sec        MICROBIOLOGY:    RECENT CULTURES:  05-17 @ 05:28 .Blood                No growth to date.    05-16 @ 03:49 .Sputum   KARAN    Numerous polymorphonuclear leukocytes seen per low power field  Few Squamous epithelial cells seen per low power field  Moderate Gram Positive Cocci in Clusters seen per oil power field  Moderate Gram Negative Rods seen per oil power field  Few Gram Negative Diplococci seen per oil power field    Methicillin resistant Staphylococcus aureus  Methicillin resistant Staphylococcus aureus     Numerous Methicillin resistant Staphylococcus aureus  Normal Respiratory Annamaria present    05-15 @ 19:26 .Blood                No growth at 5 days.    05-15 @ 00:49 .Blood                No growth at 5 days.    05-14 @ 14:26 .Sputum trap   KARAN    Moderate polymorphonuclear leukocytes per low power field  Moderate Squamous epithelial cells per low power field  Moderate Gram Variable Rods per oil power field  Moderate Gram Positive Cocci in Clusters per oil power field    Methicillin resistant Staphylococcus aureus  Methicillin resistant Staphylococcus aureus     Numerous Methicillin resistant Staphylococcus aureus  Normal Respiratory Annamaria absent          RESPIRATORY CULTURES:      Clostridium difficile [facility] Toxins A&amp;B, EIA:   Negative (05-14 @ 11:07)          RADIOLOGY & ADDITIONAL STUDIES:        Pager [phone]  After 5 pm/weekends or if no response :[phone]       Physical Exam  General: Well nourished, Cooperative, Alert and Oriented    Airway:  Mallampati: II / II  Mouth Opening: Normal  TM Distance: Normal  Tongue: Normal  Neck ROM: Normal ROM    Dental:  Intact    Chest/Lungs:  Normal Respiratory Rate    Heart:  Rate: Normal  Rhythm: Regular Rhythm    Anesthesia Plan  Type of Anesthesia, risks & benefits discussed:    Anesthesia Type: MAC  Intra-op Monitoring Plan: Standard ASA Monitors  Post Op Pain Control Plan: multimodal analgesia  Induction:  IV  Informed Consent: Informed consent signed with the Patient and all parties understand the risks and agree with anesthesia plan.  All questions answered. Patient consented to blood products? Yes  ASA Score: 3  Day of Surgery Review of History & Physical: H&P Update referred to the surgeon/provider.    Ready For Surgery From Anesthesia Perspective.   .

## 2024-04-04 NOTE — ASSESSMENT & PLAN NOTE
Patient is identified as having Combined Systolic and Diastolic heart failure that is Acute on chronic. CHF is currently uncontrolled due to >3 pillow orthopnea and Rales/crackles on pulmonary exam. Latest ECHO performed and demonstrates- Results for orders placed during the hospital encounter of 04/02/24    Echo Saline Bubble? No    Interpretation Summary    Left Ventricle: The left ventricle is moderately dilated. Mildly increased wall thickness. There is severely reduced systolic function with a visually estimated ejection fraction of 20 - 25%. Grade II diastolic dysfunction.    Right Ventricle: Normal right ventricular cavity size. Wall thickness is normal. Right ventricle wall motion  is normal. Systolic function is moderately reduced.    Left Atrium: Left atrium is severely dilated.    Aortic Valve: There is moderate aortic valve sclerosis. There is mild aortic regurgitation.    Mitral Valve: There is severe regurgitation.    Tricuspid Valve: There is mild regurgitation.    Pulmonary Artery: The estimated pulmonary artery systolic pressure is 45 mmHg.    IVC/SVC: Elevated venous pressure at 15 mmHg.  . Continue Beta Blocker and Furosemide and monitor clinical status closely. Monitor on telemetry. Patient is on CHF pathway.  Monitor strict Is&Os and daily weights.  Place on fluid restriction of 1.5 L. Cardiology has been consulted. Continue to stress to patient importance of self efficacy and  on diet for CHF. Last BNP reviewed- and noted below   Recent Labs   Lab 04/02/24  0817   *       -continue IV diuretic  -plan ischemic workup as reduced EF new finding

## 2024-04-04 NOTE — ASSESSMENT & PLAN NOTE
Patient is identified as having Diastolic (HFpEF) heart failure that is Acute. CHF is currently uncontrolled due to Pulmonary edema/pleural effusion on CXR. Latest ECHO performed and demonstrates- No results found for this or any previous visit.  . Continue Furosemide and monitor clinical status closely. Monitor on telemetry. Patient is on CHF pathway.  Monitor strict Is&Os and daily weights.  Place on fluid restriction of 2 L. Cardiology has been consulted. Continue to stress to patient importance of self efficacy and  on diet for CHF. Last BNP reviewed- and noted below   Recent Labs   Lab 04/02/24  0817   *     Cw iv lasix     4/3/24  -TTE with EF of 20-25%, moderately reduced RV function, pulmonary HTN, severe MR  -Continue IV diuresis  -Continue BB  -Will add ARB vs Entresto pending creatinine/BP trend  -Strict I's/O's  -Probable R/LHC tmw pending reassessment    4.4.2024  Right and left heart catheterization today.    Lasix held due to drop in pressure yesterday evening.    Discussed risks and benefits with the family, son and daughter at bedside.

## 2024-04-04 NOTE — BRIEF OP NOTE
[facility] - Cath Lab ([facility])  Brief Operative Note  Cardiology    SUMMARY     Surgery Date: 4/4/2024     Surgeon(s) and Role:     * Suzanna Hernandez MD - Primary    Assisting Surgeon: None    Pre-op Diagnosis:  Atrial flutter, unspecified type [I48.92]    Post-op Diagnosis: Post-Op Diagnosis Codes:     * Atrial flutter, unspecified type [I48.92]    Procedure Performed:     Procedure(s) (LRB):  Transesophageal echo (ISABEL) intra-procedure log documentation (N/A)    Technical Procedures Used:     Operative Findings:   Successful isabel dccv   At least moderate to severe MR , eccentric huggin on the atrial wall   Sinus in the 60's       Estimated Blood Loss: * No values recorded between 4/4/2024  2:09 PM and 4/4/2024  2:31 PM *         Specimens:   Specimen (24h ago, onward)      None

## 2024-04-04 NOTE — SUBJECTIVE & OBJECTIVE
Review of Systems   Cardiovascular:  Positive for leg swelling. Negative for chest pain, dyspnea on exertion, palpitations and syncope.   Genitourinary: Negative.    Neurological: Negative.      Objective:     Vital Signs (Most Recent):  Temp: 97.7 °F (36.5 °C) (04/04/24 0824)  Pulse: 104 (04/04/24 0857)  Resp: 17 (04/04/24 0824)  BP: 105/66 (04/04/24 0824)  SpO2: (!) 93 % (04/04/24 0824) Vital Signs (24h Range):  Temp:  [97.4 °F (36.3 °C)-98.6 °F (37 °C)] 97.7 °F (36.5 °C)  Pulse:  [] 104  Resp:  [17-20] 17  SpO2:  [89 %-96 %] 93 %  BP: ()/(53-66) 105/66     Weight: 123.4 kg (272 lb 0.8 oz)  Body mass index is 37.94 kg/m².     SpO2: (!) 93 %         Intake/Output Summary (Last 24 hours) at 4/4/2024 1054  Last data filed at 4/3/2024 2242  Gross per 24 hour   Intake 228.11 ml   Output 2175 ml   Net -1946.89 ml       Lines/Drains/Airways       Peripheral Intravenous Line  Duration                  Peripheral IV - Single Lumen 04/02/24 0813 20 G Posterior;Right Forearm 2 days         Peripheral IV - Single Lumen 04/03/24 1700 22 G Posterior;Right Hand <1 day                       Physical Exam  Vitals reviewed.   Constitutional:       Appearance: He is well-developed.   Neck:      Vascular: No carotid bruit.   Cardiovascular:      Rate and Rhythm: Tachycardia present. Rhythm irregular.      Pulses: Intact distal pulses.      Heart sounds: Normal heart sounds. No murmur heard.  Pulmonary:      Breath sounds: Normal breath sounds.   Neurological:      Mental Status: He is oriented to person, place, and time.            Significant Labs: All pertinent lab results from the last 24 hours have been reviewed. and   Recent Lab Results         04/04/24  0520   04/03/24  1631   04/03/24  1335        Albumin 3.2           ALP 58           ALT 15           Anion Gap 11           PTT 40.3  Comment: Refer to local heparin nomogram for intensity/dose specific   therapeutic   range.             AST 17           Baso #  0.02           Basophil % 0.5           BILIRUBIN TOTAL 0.9  Comment: For infants and newborns, interpretation of results should be based  on gestational age, weight and in agreement with clinical  observations.    Premature Infant recommended reference ranges:  Up to 24 hours.............<8.0 mg/dL  Up to 48 hours............<12.0 mg/dL  3-5 days..................<15.0 mg/dL  6-29 days.................<15.0 mg/dL             BUN 29           Calcium 9.3           Chloride 97           CO2 32           Creatinine 1.4           Differential Method Automated           eGFR 51           Eos # 0.1           Eos % 1.6           Glucose 88           Gran # (ANC) 2.5           Gran % 58.5           Hematocrit 41.8           Hemoglobin 13.8           Immature Grans (Abs) 0.01  Comment: Mild elevation in immature granulocytes is non specific and   can be seen in a variety of conditions including stress response,   acute inflammation, trauma and pregnancy. Correlation with other   laboratory and clinical findings is essential.             Immature Granulocytes 0.2           Lymph # 1.2           Lymph % 27.1           Magnesium  1.8           MCH 32.0           MCHC 33.0           MCV 97           Mono # 0.5           Mono % 12.1           MPV 10.7           nRBC 0           QRS Duration     142       [facility] QTC Calculation     510       Phosphorus Level 4.2           Platelet Count 179           POCT Glucose   107         Potassium 3.2           PROTEIN TOTAL 6.7           RBC 4.31           RDW 13.4           Sodium 140           WBC 4.28                   Significant Imaging: Echocardiogram: Transthoracic echo (TTE) complete (Cupid Only):   Results for orders placed or performed during the hospital encounter of 04/02/24   Echo Saline Bubble? No   Result Value Ref Range    BSA 2.55 m2    LVOT stroke volume 47.18 cm3    LVIDd 5.79 3.5 - 6.0 cm    LV Systolic Volume 112.00 mL    LV Systolic Volume Index 45.5 mL/m2    LVIDs 4.88  (A) 2.1 - 4.0 cm    LV Diastolic Volume 165.91 mL    LV Diastolic Volume Index 67.44 mL/m2    IVS 1.29 (A) 0.6 - 1.1 cm    LVOT diameter 2.11 cm    LVOT area 3.5 cm2    FS 16 (A) 28 - 44 %    Left Ventricle Relative Wall Thickness 0.35 cm    Posterior Wall 1.02 0.6 - 1.1 cm    LV mass 281.26 g    LV Mass Index 114 g/m2    MV Peak E Guru 1.27 m/s    TDI LATERAL 0.11 m/s    TDI SEPTAL 0.09 m/s    E/E' ratio 12.70 m/s    MV Peak A Guru 1.01 m/s    TR Max Guru 2.74 m/s    E/A ratio 1.26     IVRT 53.28 msec    E wave deceleration time 102.45 msec    LV SEPTAL E/E' RATIO 14.11 m/s    LV LATERAL E/E' RATIO 11.55 m/s    LVOT peak guru 0.88 m/s    Left Ventricular Outflow Tract Mean Velocity 0.76 cm/s    Left Ventricular Outflow Tract Mean Gradient 2.34 mmHg    RVDD 3.32 cm    RVOT peak VTI 7.4 cm    TAPSE 1.17 cm    LA size 4.81 cm    Left Atrium Minor Axis 7.08 cm    Left Atrium Major Axis 7.13 cm    RA Major Axis 6.17 cm    AV regurgitation pressure 1/2 time [id number] ms    AR Max Guru 2.74 m/s    AV mean gradient 5 mmHg    AV peak gradient 8 mmHg    Ao peak guru 1.43 m/s    Ao VTI 19.90 cm    LVOT peak VTI 13.50 cm    AV valve area 2.37 cm²    AV Velocity Ratio 0.62     AV index (prosthetic) 0.68     ZENIA by Velocity Ratio 2.15 cm²    Mr max guru 6.01 m/s    MV stenosis pressure 1/2 time 29.71 ms    MV valve area p 1/2 method 7.40 cm2    TV mean gradient 28 mmHg    Triscuspid Valve Regurgitation Peak Gradient 30 mmHg    PV mean gradient 1 mmHg    RVOT peak guru 0.57 m/s    Ao root annulus 3.73 cm    STJ 3.46 cm    Ascending aorta 3.42 cm    IVC diameter 2.33 cm    Mean e' 0.10 m/s    ZLVIDS -3.39     ZLVIDD -7.76     LA Volume Index 59.0 mL/m2    LA volume 145.24 cm3    LA WIDTH 5.0 cm    RA Width 3.7 cm    TV resting pulmonary artery pressure 45 mmHg    RV TB RVSP 18 mmHg    Est. RA pres 15 mmHg    Narrative      Left Ventricle: The left ventricle is moderately dilated. Mildly   increased wall thickness. There is  severely reduced systolic function with   a visually estimated ejection fraction of 20 - 25%. Grade II diastolic   dysfunction.    Right Ventricle: Normal right ventricular cavity size. Wall thickness   is normal. Right ventricle wall motion  is normal. Systolic function is   moderately reduced.    Left Atrium: Left atrium is severely dilated.    Aortic Valve: There is moderate aortic valve sclerosis. There is mild   aortic regurgitation.    Mitral Valve: There is severe regurgitation.    Tricuspid Valve: There is mild regurgitation.    Pulmonary Artery: The estimated pulmonary artery systolic pressure is   45 mmHg.    IVC/SVC: Elevated venous pressure at 15 mmHg.     EKG showing atrial flutter

## 2024-04-04 NOTE — ANESTHESIA POSTPROCEDURE EVALUATION
Anesthesia Post Evaluation    Patient: Chucho Clark    Procedure(s) Performed: Procedure(s) (LRB):  Transesophageal echo (RODERICK) intra-procedure log documentation (N/A)    Final Anesthesia Type: MAC      Patient location during evaluation: PACU  Patient participation: Yes- Able to Participate  Level of consciousness: awake and alert and oriented  Post-procedure vital signs: reviewed and stable  Pain management: adequate  Airway patency: patent  NANCY mitigation strategies: Multimodal analgesia and Extubation and recovery carried out in lateral, semiupright, or other nonsupine position  PONV status at discharge: No PONV  Anesthetic complications: no      Cardiovascular status: blood pressure returned to baseline and hemodynamically stable  Respiratory status: unassisted and spontaneous ventilation  Hydration status: euvolemic  Follow-up not needed.  Comments: Report given to PACU RN. Hand Off Tool Used. RN given opportunity to ask questions or clarify concerns. No Concerns verbalized. RN was asked if ready to assume care of patient. RN verbally confirmed. Pt. Left in stable condition. SV. Vital Signs Return to Near Baseline. No s/s of distress noted.                 No case tracking events are documented in the log.      Pain/Cordelia Score: Cordelia Score: 9 (4/4/2024  1:51 PM)

## 2024-04-04 NOTE — ASSESSMENT & PLAN NOTE
Chronic, . Latest blood pressure and vitals reviewed-     Temp:  [97.5 °F (36.4 °C)-98.6 °F (37 °C)]   Pulse:  []   Resp:  [17-20]   BP: ()/(53-66)   SpO2:  [92 %-96 %] .   Home meds for hypertension were reviewed and noted below.   Hypertension Medications               metoprolol succinate (TOPROL-XL) 25 MG 24 hr tablet Take 1 tablet (25 mg total) by mouth once daily.    torsemide (DEMADEX) 20 MG Tab Take 1 tablet (20 mg total) by mouth 2 (two) times a day.            While in the hospital, will manage blood pressure as follows; Continue home antihypertensive regimen    Will utilize p.r.n. blood pressure medication only if patient's blood pressure greater than 160/100 and he develops symptoms such as worsening chest pain or shortness of breath.

## 2024-04-04 NOTE — SUBJECTIVE & OBJECTIVE
Interval History: f/u CHF feeling better, diuresing well. Discussed likely heart cath.     Review of Systems  Objective:     Vital Signs (Most Recent):  Temp: 97.5 °F (36.4 °C) (04/04/24 1131)  Pulse: (!) 112 (04/04/24 1131)  Resp: 20 (04/04/24 1131)  BP: 100/63 (04/04/24 1131)  SpO2: 95 % (04/04/24 1131) Vital Signs (24h Range):  Temp:  [97.5 °F (36.4 °C)-98.6 °F (37 °C)] 97.5 °F (36.4 °C)  Pulse:  [] 112  Resp:  [17-20] 20  SpO2:  [92 %-96 %] 95 %  BP: ()/(53-66) 100/63     Weight: 123.4 kg (272 lb 0.8 oz)  Body mass index is 37.94 kg/m².    Intake/Output Summary (Last 24 hours) at 4/4/2024 1318  Last data filed at 4/3/2024 2242  Gross per 24 hour   Intake 228.11 ml   Output 2175 ml   Net -1946.89 ml         Physical Exam  HENT:      Head: Normocephalic and atraumatic.   Cardiovascular:      Rate and Rhythm: Normal rate and regular rhythm.      Heart sounds: No murmur heard.  Pulmonary:      Effort: Pulmonary effort is normal. No respiratory distress.      Breath sounds: Normal breath sounds. No wheezing.   Abdominal:      General: Bowel sounds are normal. There is no distension.      Palpations: Abdomen is soft.      Tenderness: There is no abdominal tenderness.   Musculoskeletal:         General: Swelling present.      Right lower leg: Edema present.      Left lower leg: Edema present.   Skin:     General: Skin is warm and dry.   Neurological:      Mental Status: He is alert and oriented to person, place, and time. Mental status is at baseline.             Significant Labs: All pertinent labs within the past 24 hours have been reviewed.  CBC:   Recent Labs   Lab 04/03/24  0558 04/04/24  0520   WBC 4.24 4.28   HGB 14.1 13.8*   HCT 43.0 41.8    179     CMP:   Recent Labs   Lab 04/03/24  0558 04/04/24  0520    140   K 3.6 3.2*    97   CO2 29 32*   GLU 88 88   BUN 20 29*   CREATININE 1.0 1.4   CALCIUM 9.4 9.3   PROT 6.9 6.7   ALBUMIN 3.2* 3.2*   BILITOT 1.2* 0.9   ALKPHOS 61 58   AST  16 17   ALT 15 15   ANIONGAP 9 11       Significant Imaging: I have reviewed all pertinent imaging results/findings within the past 24 hours.

## 2024-04-04 NOTE — CONSULTS
Attempted to provide nutrition education, pt not in the room    Attached the following documents to pt's discharge papers:  -Low Salt Diet  -Fluid Restricted Diet    Left the following handouts on pt's bedside table:   (Per the [facility])  -Low Sodium Nutrition Therapy  -Fluid Restriction Nutrition Therapy    Will discuss nutrition education at RD follow up. Please encourage pt to use RD contact information provided on handouts with any questions/concerns the pt may have.     *Please re-consult as needed    Thank you,     Cherelle Ramirez, Registration Eligible, Provisional LDN

## 2024-04-05 LAB
ANION GAP SERPL CALC-SCNC: 9 MMOL/L (ref 8–16)
AORTIC ROOT ANNULUS: 3.51 CM
APTT PPP: 37 SEC (ref 21–32)
ASCENDING AORTA: 3.26 CM
AV INDEX (PROSTH): 0.57
AV MEAN GRADIENT: 8 MMHG
AV PEAK GRADIENT: 13 MMHG
AV REGURGITATION PRESSURE HALF TIME: 709.29 MS
AV VALVE AREA BY VELOCITY RATIO: 1.81 CM²
AV VALVE AREA: 1.85 CM²
AV VELOCITY RATIO: 0.56
BASOPHILS # BLD AUTO: 0.02 K/UL (ref 0–0.2)
BASOPHILS # BLD AUTO: 0.02 K/UL (ref 0–0.2)
BASOPHILS NFR BLD: 0.5 % (ref 0–1.9)
BASOPHILS NFR BLD: 0.5 % (ref 0–1.9)
BSA FOR ECHO PROCEDURE: 2.55 M2
BUN SERPL-MCNC: 25 MG/DL (ref 8–23)
CALCIUM SERPL-MCNC: 9 MG/DL (ref 8.7–10.5)
CHLORIDE SERPL-SCNC: 100 MMOL/L (ref 95–110)
CO2 SERPL-SCNC: 27 MMOL/L (ref 23–29)
CREAT SERPL-MCNC: 1.3 MG/DL (ref 0.5–1.4)
CV ECHO LV RWT: 0.49 CM
DIFFERENTIAL METHOD BLD: ABNORMAL
DIFFERENTIAL METHOD BLD: ABNORMAL
DOP CALC AO PEAK VEL: 1.81 M/S
DOP CALC AO VTI: 33 CM
DOP CALC LVOT AREA: 3.2 CM2
DOP CALC LVOT DIAMETER: 2.03 CM
DOP CALC LVOT PEAK VEL: 1.01 M/S
DOP CALC LVOT STROKE VOLUME: 61.14 CM3
DOP CALC MV VTI: 36.9 CM
DOP CALCLVOT PEAK VEL VTI: 18.9 CM
E WAVE DECELERATION TIME: 154.04 MSEC
E/A RATIO: 2.63
ECHO LV POSTERIOR WALL: 1.35 CM (ref 0.6–1.1)
EOSINOPHIL # BLD AUTO: 0.1 K/UL (ref 0–0.5)
EOSINOPHIL # BLD AUTO: 0.1 K/UL (ref 0–0.5)
EOSINOPHIL NFR BLD: 1.1 % (ref 0–8)
EOSINOPHIL NFR BLD: 1.2 % (ref 0–8)
ERYTHROCYTE [DISTWIDTH] IN BLOOD BY AUTOMATED COUNT: 13.6 % (ref 11.5–14.5)
ERYTHROCYTE [DISTWIDTH] IN BLOOD BY AUTOMATED COUNT: 13.7 % (ref 11.5–14.5)
EST. GFR  (NO RACE VARIABLE): 56 ML/MIN/1.73 M^2
FRACTIONAL SHORTENING: 18 % (ref 28–44)
GLUCOSE SERPL-MCNC: 92 MG/DL (ref 70–110)
HCT VFR BLD AUTO: 41 % (ref 40–54)
HCT VFR BLD AUTO: 41.3 % (ref 40–54)
HGB BLD-MCNC: 13.7 G/DL (ref 14–18)
HGB BLD-MCNC: 13.7 G/DL (ref 14–18)
IMM GRANULOCYTES # BLD AUTO: 0.01 K/UL (ref 0–0.04)
IMM GRANULOCYTES # BLD AUTO: 0.01 K/UL (ref 0–0.04)
IMM GRANULOCYTES NFR BLD AUTO: 0.2 % (ref 0–0.5)
IMM GRANULOCYTES NFR BLD AUTO: 0.2 % (ref 0–0.5)
INTERVENTRICULAR SEPTUM: 1.7 CM (ref 0.6–1.1)
IVC DIAMETER: 2.72 CM
LA MAJOR: 7.28 CM
LA MINOR: 7.53 CM
LA WIDTH: 5.4 CM
LACTATE SERPL-SCNC: 1.1 MMOL/L (ref 0.5–2.2)
LEFT ATRIUM SIZE: 5.05 CM
LEFT ATRIUM VOLUME INDEX: 69.8 ML/M2
LEFT ATRIUM VOLUME: 171.6 CM3
LEFT INTERNAL DIMENSION IN SYSTOLE: 4.52 CM (ref 2.1–4)
LEFT VENTRICLE DIASTOLIC VOLUME INDEX: 59.91 ML/M2
LEFT VENTRICLE DIASTOLIC VOLUME: 147.39 ML
LEFT VENTRICLE MASS INDEX: 155 G/M2
LEFT VENTRICLE SYSTOLIC VOLUME INDEX: 38 ML/M2
LEFT VENTRICLE SYSTOLIC VOLUME: 93.48 ML
LEFT VENTRICULAR INTERNAL DIMENSION IN DIASTOLE: 5.5 CM (ref 3.5–6)
LEFT VENTRICULAR MASS: 382.2 G
LVOT MG: 3.53 MMHG
LVOT MV: 0.94 CM/S
LYMPHOCYTES # BLD AUTO: 0.6 K/UL (ref 1–4.8)
LYMPHOCYTES # BLD AUTO: 0.7 K/UL (ref 1–4.8)
LYMPHOCYTES NFR BLD: 12.4 % (ref 18–48)
LYMPHOCYTES NFR BLD: 17.8 % (ref 18–48)
MAGNESIUM SERPL-MCNC: 1.8 MG/DL (ref 1.6–2.6)
MCH RBC QN AUTO: 32.3 PG (ref 27–31)
MCH RBC QN AUTO: 32.4 PG (ref 27–31)
MCHC RBC AUTO-ENTMCNC: 33.2 G/DL (ref 32–36)
MCHC RBC AUTO-ENTMCNC: 33.4 G/DL (ref 32–36)
MCV RBC AUTO: 97 FL (ref 82–98)
MCV RBC AUTO: 98 FL (ref 82–98)
MONOCYTES # BLD AUTO: 0.4 K/UL (ref 0.3–1)
MONOCYTES # BLD AUTO: 0.5 K/UL (ref 0.3–1)
MONOCYTES NFR BLD: 10.8 % (ref 4–15)
MONOCYTES NFR BLD: 8.6 % (ref 4–15)
MV MEAN GRADIENT: 4 MMHG
MV PEAK A VEL: 0.49 M/S
MV PEAK E VEL: 1.29 M/S
MV PEAK GRADIENT: 12 MMHG
MV STENOSIS PRESSURE HALF TIME: 44.67 MS
MV VALVE AREA BY CONTINUITY EQUATION: 1.66 CM2
MV VALVE AREA P 1/2 METHOD: 4.93 CM2
NEUTROPHILS # BLD AUTO: 2.9 K/UL (ref 1.8–7.7)
NEUTROPHILS # BLD AUTO: 3.4 K/UL (ref 1.8–7.7)
NEUTROPHILS NFR BLD: 69.5 % (ref 38–73)
NEUTROPHILS NFR BLD: 77.2 % (ref 38–73)
NRBC BLD-RTO: 0 /100 WBC
NRBC BLD-RTO: 0 /100 WBC
OHS QRS DURATION: 130 MS
OHS QRS DURATION: 144 MS
OHS QRS DURATION: 148 MS
OHS QTC CALCULATION: 472 MS
OHS QTC CALCULATION: 519 MS
OHS QTC CALCULATION: 526 MS
PHOSPHATE SERPL-MCNC: 4 MG/DL (ref 2.7–4.5)
PISA AR MAX VEL: 2.83 M/S
PISA MRMAX VEL: 4.9 M/S
PISA TR MAX VEL: 3.07 M/S
PLATELET # BLD AUTO: 170 K/UL (ref 150–450)
PLATELET # BLD AUTO: 182 K/UL (ref 150–450)
PMV BLD AUTO: 10.6 FL (ref 9.2–12.9)
PMV BLD AUTO: 11.5 FL (ref 9.2–12.9)
POC ACTIVATED CLOTTING TIME K: 233 SEC (ref 74–137)
POC ACTIVATED CLOTTING TIME K: 244 SEC (ref 74–137)
POC ACTIVATED CLOTTING TIME K: 244 SEC (ref 74–137)
POTASSIUM SERPL-SCNC: 3.6 MMOL/L (ref 3.5–5.1)
RA MAJOR: 5.96 CM
RA PRESSURE ESTIMATED: 3 MMHG
RA WIDTH: 3.8 CM
RBC # BLD AUTO: 4.23 M/UL (ref 4.6–6.2)
RBC # BLD AUTO: 4.24 M/UL (ref 4.6–6.2)
RV TB RVSP: 6 MMHG
SAMPLE: ABNORMAL
SODIUM SERPL-SCNC: 136 MMOL/L (ref 136–145)
STJ: 3.32 CM
TR MAX PG: 38 MMHG
TR MEAN GRADIENT: 33 MMHG
TRICUSPID ANNULAR PLANE SYSTOLIC EXCURSION: 2.25 CM
TV REST PULMONARY ARTERY PRESSURE: 41 MMHG
WBC # BLD AUTO: 4.16 K/UL (ref 3.9–12.7)
WBC # BLD AUTO: 4.43 K/UL (ref 3.9–12.7)
Z-SCORE OF LEFT VENTRICULAR DIMENSION IN END DIASTOLE: -8.28
Z-SCORE OF LEFT VENTRICULAR DIMENSION IN END SYSTOLE: -4

## 2024-04-05 PROCEDURE — 99152 MOD SED SAME PHYS/QHP 5/>YRS: CPT | Performed by: INTERNAL MEDICINE

## 2024-04-05 PROCEDURE — 63600175 PHARM REV CODE 636 W HCPCS: Performed by: INTERNAL MEDICINE

## 2024-04-05 PROCEDURE — 20000000 HC ICU ROOM

## 2024-04-05 PROCEDURE — 25000003 PHARM REV CODE 250: Performed by: PHYSICIAN ASSISTANT

## 2024-04-05 PROCEDURE — C1761 OPTIME CATH, TRANSLUMINAL INTRAVASC LITHO, CORONARY: HCPCS | Performed by: INTERNAL MEDICINE

## 2024-04-05 PROCEDURE — 5A02210 ASSISTANCE WITH CARDIAC OUTPUT USING BALLOON PUMP, CONTINUOUS: ICD-10-PCS | Performed by: INTERNAL MEDICINE

## 2024-04-05 PROCEDURE — 36415 COLL VENOUS BLD VENIPUNCTURE: CPT | Mod: XB | Performed by: INTERNAL MEDICINE

## 2024-04-05 PROCEDURE — 80048 BASIC METABOLIC PNL TOTAL CA: CPT | Performed by: INTERNAL MEDICINE

## 2024-04-05 PROCEDURE — 25000003 PHARM REV CODE 250: Performed by: SPECIALIST

## 2024-04-05 PROCEDURE — 02F03ZZ FRAGMENTATION IN CORONARY ARTERY, ONE ARTERY, PERCUTANEOUS APPROACH: ICD-10-PCS | Performed by: INTERNAL MEDICINE

## 2024-04-05 PROCEDURE — 84100 ASSAY OF PHOSPHORUS: CPT | Performed by: NURSE PRACTITIONER

## 2024-04-05 PROCEDURE — 25000003 PHARM REV CODE 250: Performed by: INTERNAL MEDICINE

## 2024-04-05 PROCEDURE — B2111ZZ FLUOROSCOPY OF MULTIPLE CORONARY ARTERIES USING LOW OSMOLAR CONTRAST: ICD-10-PCS | Performed by: INTERNAL MEDICINE

## 2024-04-05 PROCEDURE — C1894 INTRO/SHEATH, NON-LASER: HCPCS | Performed by: INTERNAL MEDICINE

## 2024-04-05 PROCEDURE — 93005 ELECTROCARDIOGRAM TRACING: CPT

## 2024-04-05 PROCEDURE — 27000190 HC CPAP FULL FACE MASK W/VALVE

## 2024-04-05 PROCEDURE — 25000003 PHARM REV CODE 250: Performed by: NURSE PRACTITIONER

## 2024-04-05 PROCEDURE — 63600175 PHARM REV CODE 636 W HCPCS: Performed by: NURSE PRACTITIONER

## 2024-04-05 PROCEDURE — 27201423 OPTIME MED/SURG SUP & DEVICES STERILE SUPPLY: Performed by: INTERNAL MEDICINE

## 2024-04-05 PROCEDURE — C1887 CATHETER, GUIDING: HCPCS | Performed by: INTERNAL MEDICINE

## 2024-04-05 PROCEDURE — 85347 COAGULATION TIME ACTIVATED: CPT | Performed by: INTERNAL MEDICINE

## 2024-04-05 PROCEDURE — 25000242 PHARM REV CODE 250 ALT 637 W/ HCPCS: Performed by: INTERNAL MEDICINE

## 2024-04-05 PROCEDURE — 92972 PERQ TRLUML CORONRY LITHOTRP: CPT | Performed by: INTERNAL MEDICINE

## 2024-04-05 PROCEDURE — 25500020 PHARM REV CODE 255: Performed by: INTERNAL MEDICINE

## 2024-04-05 PROCEDURE — 4A023N7 MEASUREMENT OF CARDIAC SAMPLING AND PRESSURE, LEFT HEART, PERCUTANEOUS APPROACH: ICD-10-PCS | Performed by: INTERNAL MEDICINE

## 2024-04-05 PROCEDURE — C1874 STENT, COATED/COV W/DEL SYS: HCPCS | Performed by: INTERNAL MEDICINE

## 2024-04-05 PROCEDURE — 83605 ASSAY OF LACTIC ACID: CPT | Performed by: INTERNAL MEDICINE

## 2024-04-05 PROCEDURE — 85730 THROMBOPLASTIN TIME PARTIAL: CPT | Performed by: INTERNAL MEDICINE

## 2024-04-05 PROCEDURE — 99900035 HC TECH TIME PER 15 MIN (STAT)

## 2024-04-05 PROCEDURE — 85025 COMPLETE CBC W/AUTO DIFF WBC: CPT | Performed by: INTERNAL MEDICINE

## 2024-04-05 PROCEDURE — 93010 ELECTROCARDIOGRAM REPORT: CPT | Mod: 76,59,ICN, | Performed by: INTERNAL MEDICINE

## 2024-04-05 PROCEDURE — 33967 INSERT I-AORT PERCUT DEVICE: CPT | Performed by: INTERNAL MEDICINE

## 2024-04-05 PROCEDURE — 94660 CPAP INITIATION&MGMT: CPT

## 2024-04-05 PROCEDURE — 83735 ASSAY OF MAGNESIUM: CPT | Performed by: NURSE PRACTITIONER

## 2024-04-05 PROCEDURE — 99291 CRITICAL CARE FIRST HOUR: CPT | Mod: 25,,, | Performed by: INTERNAL MEDICINE

## 2024-04-05 PROCEDURE — C1769 GUIDE WIRE: HCPCS | Performed by: INTERNAL MEDICINE

## 2024-04-05 PROCEDURE — 027135Z DILATION OF CORONARY ARTERY, TWO ARTERIES WITH TWO DRUG-ELUTING INTRALUMINAL DEVICES, PERCUTANEOUS APPROACH: ICD-10-PCS | Performed by: INTERNAL MEDICINE

## 2024-04-05 PROCEDURE — 33967 INSERT I-AORT PERCUT DEVICE: CPT | Mod: 51,,, | Performed by: INTERNAL MEDICINE

## 2024-04-05 PROCEDURE — C9600 PERC DRUG-EL COR STENT SING: HCPCS | Mod: LC | Performed by: INTERNAL MEDICINE

## 2024-04-05 PROCEDURE — 85730 THROMBOPLASTIN TIME PARTIAL: CPT | Mod: 91 | Performed by: SPECIALIST

## 2024-04-05 PROCEDURE — 36415 COLL VENOUS BLD VENIPUNCTURE: CPT | Mod: XB | Performed by: SPECIALIST

## 2024-04-05 PROCEDURE — 92972 PERQ TRLUML CORONRY LITHOTRP: CPT | Mod: ,,, | Performed by: INTERNAL MEDICINE

## 2024-04-05 PROCEDURE — 94761 N-INVAS EAR/PLS OXIMETRY MLT: CPT

## 2024-04-05 PROCEDURE — 27100171 HC OXYGEN HIGH FLOW UP TO 24 HOURS

## 2024-04-05 PROCEDURE — 93010 ELECTROCARDIOGRAM REPORT: CPT | Mod: XS,,, | Performed by: INTERNAL MEDICINE

## 2024-04-05 PROCEDURE — 99152 MOD SED SAME PHYS/QHP 5/>YRS: CPT | Mod: ,,, | Performed by: INTERNAL MEDICINE

## 2024-04-05 PROCEDURE — 99153 MOD SED SAME PHYS/QHP EA: CPT | Performed by: INTERNAL MEDICINE

## 2024-04-05 PROCEDURE — 92928 PRQ TCAT PLMT NTRAC ST 1 LES: CPT | Mod: LC,59,51, | Performed by: INTERNAL MEDICINE

## 2024-04-05 PROCEDURE — C1725 CATH, TRANSLUMIN NON-LASER: HCPCS | Performed by: INTERNAL MEDICINE

## 2024-04-05 DEVICE — EVEROLIMUS-ELUTING PLATINUM CHROMIUM CORONARY STENT SYSTEM
Type: IMPLANTABLE DEVICE | Site: HEART | Status: FUNCTIONAL
Brand: SYNERGY™ XD

## 2024-04-05 RX ORDER — ACETAMINOPHEN 325 MG/1
650 TABLET ORAL EVERY 4 HOURS PRN
Status: DISCONTINUED | OUTPATIENT
Start: 2024-04-05 | End: 2024-04-08 | Stop reason: SDUPTHER

## 2024-04-05 RX ORDER — HEPARIN SODIUM,PORCINE/D5W 25000/250
0-40 INTRAVENOUS SOLUTION INTRAVENOUS CONTINUOUS
Status: DISCONTINUED | OUTPATIENT
Start: 2024-04-05 | End: 2024-04-09

## 2024-04-05 RX ORDER — ISOSORBIDE MONONITRATE 30 MG/1
30 TABLET, EXTENDED RELEASE ORAL DAILY
Status: DISCONTINUED | OUTPATIENT
Start: 2024-04-05 | End: 2024-04-12 | Stop reason: HOSPADM

## 2024-04-05 RX ORDER — AMIODARONE HYDROCHLORIDE 200 MG/1
200 TABLET ORAL 2 TIMES DAILY
Status: DISCONTINUED | OUTPATIENT
Start: 2024-04-05 | End: 2024-04-07

## 2024-04-05 RX ORDER — AMIODARONE HYDROCHLORIDE 100 MG/1
200 TABLET ORAL 2 TIMES DAILY
Status: DISCONTINUED | OUTPATIENT
Start: 2024-04-05 | End: 2024-04-05

## 2024-04-05 RX ORDER — CLOPIDOGREL BISULFATE 300 MG/1
TABLET, FILM COATED ORAL
Status: DISCONTINUED | OUTPATIENT
Start: 2024-04-05 | End: 2024-04-05 | Stop reason: HOSPADM

## 2024-04-05 RX ORDER — MUPIROCIN 20 MG/G
OINTMENT TOPICAL 2 TIMES DAILY
Status: DISPENSED | OUTPATIENT
Start: 2024-04-05 | End: 2024-04-10

## 2024-04-05 RX ORDER — CLOPIDOGREL BISULFATE 75 MG/1
75 TABLET ORAL DAILY
Status: DISCONTINUED | OUTPATIENT
Start: 2024-04-06 | End: 2024-04-12 | Stop reason: HOSPADM

## 2024-04-05 RX ORDER — HEPARIN SODIUM 1000 [USP'U]/ML
INJECTION INTRAVENOUS; SUBCUTANEOUS
Status: DISCONTINUED | OUTPATIENT
Start: 2024-04-05 | End: 2024-04-05 | Stop reason: HOSPADM

## 2024-04-05 RX ORDER — SODIUM CHLORIDE 9 MG/ML
INJECTION, SOLUTION INTRAVENOUS CONTINUOUS
Status: DISCONTINUED | OUTPATIENT
Start: 2024-04-05 | End: 2024-04-05

## 2024-04-05 RX ORDER — CEFAZOLIN SODIUM 1 G/3ML
INJECTION, POWDER, FOR SOLUTION INTRAMUSCULAR; INTRAVENOUS
Status: DISCONTINUED | OUTPATIENT
Start: 2024-04-05 | End: 2024-04-05 | Stop reason: HOSPADM

## 2024-04-05 RX ORDER — ONDANSETRON 8 MG/1
8 TABLET, ORALLY DISINTEGRATING ORAL EVERY 8 HOURS PRN
Status: DISCONTINUED | OUTPATIENT
Start: 2024-04-05 | End: 2024-04-08 | Stop reason: SDUPTHER

## 2024-04-05 RX ORDER — LIDOCAINE HYDROCHLORIDE 20 MG/ML
INJECTION, SOLUTION EPIDURAL; INFILTRATION; INTRACAUDAL; PERINEURAL
Status: DISCONTINUED | OUTPATIENT
Start: 2024-04-05 | End: 2024-04-05 | Stop reason: HOSPADM

## 2024-04-05 RX ORDER — FUROSEMIDE 10 MG/ML
40 INJECTION INTRAMUSCULAR; INTRAVENOUS EVERY 8 HOURS
Status: DISCONTINUED | OUTPATIENT
Start: 2024-04-05 | End: 2024-04-06

## 2024-04-05 RX ORDER — FUROSEMIDE 10 MG/ML
40 INJECTION INTRAMUSCULAR; INTRAVENOUS ONCE
Status: COMPLETED | OUTPATIENT
Start: 2024-04-05 | End: 2024-04-05

## 2024-04-05 RX ORDER — NITROGLYCERIN 0.4 MG/1
0.4 TABLET SUBLINGUAL EVERY 5 MIN PRN
Status: DISCONTINUED | OUTPATIENT
Start: 2024-04-05 | End: 2024-04-12 | Stop reason: HOSPADM

## 2024-04-05 RX ORDER — SODIUM CHLORIDE 9 MG/ML
INJECTION, SOLUTION INTRAVENOUS
Status: DISCONTINUED | OUTPATIENT
Start: 2024-04-05 | End: 2024-04-10

## 2024-04-05 RX ORDER — ALPRAZOLAM 0.25 MG/1
0.25 TABLET ORAL EVERY 8 HOURS PRN
Status: DISCONTINUED | OUTPATIENT
Start: 2024-04-05 | End: 2024-04-10

## 2024-04-05 RX ORDER — MIDAZOLAM HYDROCHLORIDE 1 MG/ML
INJECTION, SOLUTION INTRAMUSCULAR; INTRAVENOUS
Status: DISCONTINUED | OUTPATIENT
Start: 2024-04-05 | End: 2024-04-05 | Stop reason: HOSPADM

## 2024-04-05 RX ORDER — FAMOTIDINE 20 MG/1
20 TABLET, FILM COATED ORAL 2 TIMES DAILY
Status: DISCONTINUED | OUTPATIENT
Start: 2024-04-06 | End: 2024-04-12 | Stop reason: HOSPADM

## 2024-04-05 RX ORDER — HEPARIN SODIUM 1000 [USP'U]/ML
INJECTION, SOLUTION INTRAVENOUS; SUBCUTANEOUS
Status: DISCONTINUED | OUTPATIENT
Start: 2024-04-05 | End: 2024-04-05 | Stop reason: HOSPADM

## 2024-04-05 RX ORDER — FENTANYL CITRATE 50 UG/ML
INJECTION, SOLUTION INTRAMUSCULAR; INTRAVENOUS
Status: DISCONTINUED | OUTPATIENT
Start: 2024-04-05 | End: 2024-04-05 | Stop reason: HOSPADM

## 2024-04-05 RX ADMIN — ISOSORBIDE MONONITRATE 30 MG: 30 TABLET, EXTENDED RELEASE ORAL at 05:04

## 2024-04-05 RX ADMIN — NITROGLYCERIN 0.4 MG: 0.4 TABLET SUBLINGUAL at 03:04

## 2024-04-05 RX ADMIN — FUROSEMIDE 40 MG: 10 INJECTION, SOLUTION INTRAMUSCULAR; INTRAVENOUS at 08:04

## 2024-04-05 RX ADMIN — METOPROLOL SUCCINATE 25 MG: 25 TABLET, EXTENDED RELEASE ORAL at 08:04

## 2024-04-05 RX ADMIN — SODIUM CHLORIDE: 9 INJECTION, SOLUTION INTRAVENOUS at 04:04

## 2024-04-05 RX ADMIN — MUPIROCIN: 20 OINTMENT TOPICAL at 08:04

## 2024-04-05 RX ADMIN — HEPARIN SODIUM 12 UNITS/KG/HR: 10000 INJECTION, SOLUTION INTRAVENOUS at 04:04

## 2024-04-05 RX ADMIN — HEPARIN SODIUM 12 UNITS/KG/HR: 10000 INJECTION, SOLUTION INTRAVENOUS at 07:04

## 2024-04-05 RX ADMIN — MORPHINE SULFATE 2 MG: 4 INJECTION INTRAVENOUS at 09:04

## 2024-04-05 RX ADMIN — HEPARIN SODIUM 14 UNITS/KG/HR: 10000 INJECTION, SOLUTION INTRAVENOUS at 08:04

## 2024-04-05 RX ADMIN — AMIODARONE HYDROCHLORIDE 200 MG: 200 TABLET ORAL at 08:04

## 2024-04-05 RX ADMIN — ALPRAZOLAM 0.25 MG: 0.25 TABLET ORAL at 08:04

## 2024-04-05 RX ADMIN — FUROSEMIDE 40 MG: 10 INJECTION, SOLUTION INTRAMUSCULAR; INTRAVENOUS at 10:04

## 2024-04-05 RX ADMIN — FUROSEMIDE 40 MG: 10 INJECTION, SOLUTION INTRAMUSCULAR; INTRAVENOUS at 03:04

## 2024-04-05 RX ADMIN — ASPIRIN 81 MG: 81 TABLET, COATED ORAL at 10:04

## 2024-04-05 NOTE — HPI
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  Of note, patient had a first-time visit with Cardiology (Dr. Corrales) on 04/1 and was initiated on Eliquis for a flutter and was stated on metoprolol. In the ED, vitals: 130/69, 121, 22, 97.8F, 95% RA. Significant Labs: BNP: 560, Trop: 0.064, Bili: 1.1. CXR: interstitial edema. EKG: Neg for STEMI. Cardiology consulted in ED. Treated with ASA, Nitro, BB, diuretic. Initiated on Heparin Infusion. Placed was observation under the care of Hospital Medicine for management of elevated troponin, ACS rule out.  During hospital stay patient underwent diuresis and is-6.5 L as of 4/5.  On 04/04 patient was taken to the cath lab and underwent RODERICK with DCCV as well as a left-to-right heart catheterization which revealed severe three-vessel disease and a consultation by CT surgery was recommended; per documentation it appears discussions were had an incision was made to take the patient back to the cath lab on 04/05 were underwent another C with PCI to the mid left circ x1 in the mid LAD x1.  In the PACU patient had an episode of flash pulmonary edema for which he required NIPPV as well as diuretics.  Balloon pump was also placed prior to transfer to the ICU. It also appears that life vest is being arranged for discharge.

## 2024-04-05 NOTE — ASSESSMENT & PLAN NOTE
-Currently in aflutter HR low 100's  -Contributing to volume status/cardiomyopathy  -Continue BB  -Start amiodarone drip  -Continue heparin drip  -Will need RODERICK/DCCV post ischemic workup as well as EP evaluation to discuss ablation in future    4.4.2024  RODERICK cardioversion today after heart catheterization.    4/5/24  -Remains in SR s/p RODERICK/DCCV  -Amiodarone switched to po  -Continue BB  -Heparin gtt for now, will need Eliquis upon d/c

## 2024-04-05 NOTE — ASSESSMENT & PLAN NOTE
- s/p RODERICK with DCCV and R/LHC 4/4 that revealed severe 3 vessel disease, discussions were had with CT surgery and Life Vest arranged  - s/p repeat LHC 4/5 with PCI to mid left circ x1 and mid LAD x1, placement of balloon pump  - plavix, ASA, statin, BB  - mgmt per cards

## 2024-04-05 NOTE — SUBJECTIVE & OBJECTIVE
Interval History: f/u CAD patient reports he is feeling better. Awaiting repeat [facility]    Review of Systems  Objective:     Vital Signs (Most Recent):  Temp: 97.3 °F (36.3 °C) (04/05/24 0742)  Pulse: 75 (04/05/24 0742)  Resp: 18 (04/05/24 0742)  BP: 104/66 (04/05/24 0742)  SpO2: (!) 94 % (04/05/24 0742) Vital Signs (24h Range):  Temp:  [97.1 °F (36.2 °C)-97.9 °F (36.6 °C)] 97.3 °F (36.3 °C)  Pulse:  [70-84] 75  Resp:  [17-21] 18  SpO2:  [93 %-99 %] 94 %  BP: ()/(46-77) 104/66     Weight: 129.7 kg (286 lb)  Body mass index is 39.89 kg/m².    Intake/Output Summary (Last 24 hours) at 4/5/2024 1322  Last data filed at 4/5/2024 0414  Gross per 24 hour   Intake 0 ml   Output 650 ml   Net -650 ml         Physical Exam  HENT:      Head: Normocephalic and atraumatic.   Cardiovascular:      Rate and Rhythm: Normal rate and regular rhythm.      Heart sounds: No murmur heard.  Pulmonary:      Effort: Pulmonary effort is normal. No respiratory distress.      Breath sounds: Normal breath sounds. No wheezing.   Abdominal:      General: Bowel sounds are normal. There is no distension.      Palpations: Abdomen is soft.      Tenderness: There is no abdominal tenderness.   Musculoskeletal:         General: Swelling present.      Right lower leg: Edema present.      Left lower leg: Edema present.   Skin:     General: Skin is warm and dry.   Neurological:      Mental Status: He is alert and oriented to person, place, and time. Mental status is at baseline.             Significant Labs: All pertinent labs within the past 24 hours have been reviewed.  CBC:   Recent Labs   Lab 04/04/24 0520 04/05/24 0537   WBC 4.28 4.16   HGB 13.8* 13.7*   HCT 41.8 41.0    182     CMP:   Recent Labs   Lab 04/04/24 0520 04/04/24  1744 04/05/24 0537    136 136   K 3.2* 3.8 3.6   CL 97 100 100   CO2 32* 26 27   GLU 88 91 92   BUN 29* 23 25*   CREATININE 1.4 1.2 1.3   CALCIUM 9.3 8.7 9.0   PROT 6.7  --   --    ALBUMIN 3.2*  --   --     BILITOT 0.9  --   --    ALKPHOS 58  --   --    AST 17  --   --    ALT 15  --   --    ANIONGAP 11 10 9       Significant Imaging: I have reviewed all pertinent imaging results/findings within the past 24 hours.

## 2024-04-05 NOTE — CONSULTS
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  Of note, patient had a first-time visit with Cardiology (Dr. Corrales) on 04/1 and was initiated on Eliquis for a flutter and was stated on metoprolol. In the ED, vitals: 130/69, 121, 22, 97.8F, 95% RA. Significant Labs: BNP: 560, Trop: 0.064, Bili: 1.1. CXR: interstitial edema. EKG: Neg for STEMI. Cardiology consulted in ED. Treated with ASA, Nitro, BB, diuretic. Initiated on Heparin Infusion. Placed was observation under the care of Hospital Medicine for management of elevated troponin, ACS rule out.  During hospital stay patient underwent diuresis and is-6.5 L as of 4/5.  On 04/04 patient was taken to the cath lab and underwent RODERICK with DCCV as well as a left-to-right heart catheterization which revealed severe three-vessel disease and a consultation by CT surgery was recommended; per documentation it appears discussions were had an incision was made to take the patient back to the cath lab on 04/05 were underwent another C with PCI to the mid left circ x1 in the mid LAD x1.  In the PACU patient had an episode of flash pulmonary edema for which he required NIPPV as well as diuretics.  Balloon pump was also placed prior to transfer to  the ICU. It also appears that life vest is being arranged for discharge.     Hospital/ICU Course:  No notes on file    Past Medical History:   Diagnosis Date    CHF (congestive heart failure)     DVT (deep venous thrombosis)     Hypertension     Renal disorder        Past Surgical History:   Procedure Laterality Date    ABDOMINAL AORTOGRAPHY  4/4/2024    Procedure: ANGIOGRAM, ABDOMINAL AORTA;  Surgeon: Suzanna Hernandez MD;  Location: [facility] CATH LAB;  Service: Cardiology;;    ANGIOGRAM, CORONARY, WITH LEFT HEART CATHETERIZATION N/A 4/4/2024    Procedure: Angiogram, Coronary, with Left Heart Cath;  Surgeon: Suzanna Hernandez MD;  Location: [facility] CATH LAB;  Service: Cardiology;  Laterality: N/A;    ARTERIOGRAPHY OF SUBCLAVIAN ARTERY  4/4/2024    Procedure: ARTERIOGRAM, SUBCLAVIAN;  Surgeon: Suzanna Hernandez MD;  Location: [facility] CATH LAB;  Service: Cardiology;;    RIGHT HEART CATHETERIZATION Right 4/4/2024    Procedure: INSERTION, CATHETER, RIGHT HEART;  Surgeon: Suzanna Hernandez MD;  Location: [facility] CATH LAB;  Service: Cardiology;  Laterality: Right;    TRANSESOPHAGEAL ECHOCARDIOGRAM WITH POSSIBLE CARDIOVERSION (RODERICK W/ POSS CARDIOVERSION) N/A 4/4/2024    Procedure: Transesophageal echo (RODERICK) intra-procedure log documentation;  Surgeon: Suzanna Hernandez MD;  Location: [facility] CATH LAB;  Service: Cardiology;  Laterality: N/A;  After [facility]       Review of patient's allergies indicates:   Allergen Reactions    Vancomycin Hives     Patient given vancomycin on 7/7/2020 developed hives.  Vancomycin added to allergy list.       Family History       Problem Relation (Age of Onset)    Cancer Mother    Diabetes Mother          Tobacco Use    Smoking status: Former    Smokeless tobacco: Former   Substance and Sexual Activity    Alcohol use: Yes    Drug use: Never    Sexual activity: Not on file         Review of Systems   Respiratory:  Negative for cough, shortness of breath and wheezing.    Cardiovascular:  Positive for leg  swelling. Negative for chest pain.   Gastrointestinal:  Negative for abdominal pain, nausea and vomiting.   Genitourinary:         Dry mouth from NIPPV   All other systems reviewed and are negative.    Objective:     Vital Signs (Most Recent):  Temp: 97.9 °F (36.6 °C) (04/05/24 1645)  Pulse: 98 (04/05/24 1516)  Resp: (!) 42 (04/05/24 1516)  BP: (!) 183/101 (04/05/24 1516)  SpO2: (!) 92 % (04/05/24 1516) Vital Signs (24h Range):  Temp:  [97.1 °F (36.2 °C)-97.9 °F (36.6 °C)] 97.9 °F (36.6 °C)  Pulse:  [70-98] 98  Resp:  [17-42] 42  SpO2:  [88 %-97 %] 92 %  BP: (104-183)/() 183/101     Weight: 129.7 kg (286 lb)  Body mass index is 39.89 kg/m².      Intake/Output Summary (Last 24 hours) at 4/5/2024 1700  Last data filed at 4/5/2024 1652  Gross per 24 hour   Intake 0 ml   Output 2560 ml   Net -2560 ml        Physical Exam  Vitals reviewed.   Constitutional:       General: He is awake. He is not in acute distress.     Appearance: He is obese.   Cardiovascular:      Rate and Rhythm: Normal rate.      Pulses:           Radial pulses are 2+ on the right side and 2+ on the left side.      Comments: IABP in place  Pulmonary:      Effort: Pulmonary effort is normal.      Breath sounds: Normal breath sounds. No wheezing.      Comments: On NIPPV, weaned to 35% FiO2 on my exam with sat of 97%  Abdominal:      General: There is no distension.      Palpations: Abdomen is soft.      Comments: Oebse abd   Musculoskeletal:      Right lower leg: Edema present.      Left lower leg: Edema present.      Comments: ALEXANDER GONZALEZ immobilized in place for IABP   Skin:     General: Skin is warm and dry.   Neurological:      General: No focal deficit present.      Mental Status: He is alert.   Psychiatric:         Behavior: Behavior is cooperative.          Vents:  Oxygen Concentration (%): 50 (04/05/24 1542)    Lines/Drains/Airways       Drain  Duration                  Urethral Catheter 04/05/24 1600 <1 day              Line  Duration                   IABP 04/05/24 1546 <1 day              Peripheral Intravenous Line  Duration                  Peripheral IV - Single Lumen 04/02/24 0813 20 G Posterior;Right Forearm 3 days         Peripheral IV - Single Lumen 04/03/24 1700 22 G Posterior;Right Hand 2 days         Peripheral IV - Single Lumen 04/05/24 1609 20 G Anterior;Left Forearm <1 day                    Significant Labs:    CBC/Anemia Profile:  Recent Labs   Lab 04/04/24  0520 04/05/24  0537   WBC 4.28 4.16   HGB 13.8* 13.7*   HCT 41.8 41.0    182   MCV 97 97   RDW 13.4 13.6        Chemistries:  Recent Labs   Lab 04/04/24  0520 04/04/24  1744 04/05/24  0537    136 136   K 3.2* 3.8 3.6   CL 97 100 100   CO2 32* 26 27   BUN 29* 23 25*   CREATININE 1.4 1.2 1.3   CALCIUM 9.3 8.7 9.0   ALBUMIN 3.2*  --   --    PROT 6.7  --   --    BILITOT 0.9  --   --    ALKPHOS 58  --   --    ALT 15  --   --    AST 17  --   --    MG 1.8  --  1.8   PHOS 4.2  --  4.0       All pertinent labs within the past 24 hours have been reviewed.    Significant Imaging:   I have reviewed all pertinent imaging results/findings within the past 24 hours.    ABG  Recent Labs   Lab 04/04/24  1246   PH 7.418   PO2 34*   PCO2 50.3   HCO3 32.5*   BE 8*     Assessment/Plan:     Cardiac/Vascular  * Acute on chronic congestive heart failure  - history of combined HF (EF 30-35% with diastolic heart failure, severe mitral regurg and a pulmonary artery pressure 41)  - underwent successful diuresis this admit and is currently 6.5L negative for stay  - continue lasix and spironolactone  - after heart catheterization on 04/05 patient had an episode of flash pulmonary edema with required BiPAP and diuresis    Atrial flutter  - s/p RODERICK and DCCV 4/4  - on amio and BB  - on heparin gtt  - tele    Elevated troponin  - see plan for CAD    Venous stasis dermatitis of both lower extremities  - diuresis, elevate extremities, supportive care    Essential hypertension  - mgmt per cards  - currently  on BB, lasix, and spironolactone   - trend hemodynamics and adjust meds as needed    Coronary artery disease involving native coronary artery of native heart  - s/p RODERICK with DCCV and R/LHC 4/4 that revealed severe 3 vessel disease, discussions were had with CT surgery and Life Vest arranged  - s/p repeat LHC 4/5 with PCI to mid left circ x1 and mid LAD x1, placement of balloon pump  - plavix, ASA, statin, BB  - mgmt per cards    Endocrine  BMI 39.0-39.9,adult  - affecting medical decision making and complicating the above   - pt would benefit greatly from weight loss and lifestyle modifications      Prophylaxis Measures:  GI ppx: Famotidine  VTE ppx: Heparin  Glucose control: Monitor blood glucose    Code Status: Full Code    Critical Care Time: 32 minutes  Critical secondary to Patient has a condition that poses threat to life and bodily function: IABP and on NIPPV for flash pulm edema      Critical care was time spent personally by me on the following activities: development of treatment plan with patient or surrogate and bedside caregivers, discussions with consultants, evaluation of patient's response to treatment, examination of patient, ordering and performing treatments and interventions, ordering and review of laboratory studies, ordering and review of radiographic studies, pulse oximetry, re-evaluation of patient's condition. This critical care time did not overlap with that of any other provider or involve time for any procedures.    Thank you for your consult. I will follow-up with patient. Please contact us if you have any additional questions.     Raghu Nicole NP  Critical Care Medicine  [facility] ([facility])

## 2024-04-05 NOTE — NURSING
Transported to cath lab A & O X 3.  State a little more comfortable now.  SR per monitor.  RN X 2 and RT transporting patient.

## 2024-04-05 NOTE — ASSESSMENT & PLAN NOTE
Patient is identified as having Combined Systolic and Diastolic heart failure that is Acute on chronic. CHF is currently uncontrolled due to >3 pillow orthopnea and Rales/crackles on pulmonary exam. Latest ECHO performed and demonstrates- Results for orders placed during the hospital encounter of 04/02/24    Echo Saline Bubble? No    Interpretation Summary    Left Ventricle: The left ventricle is moderately dilated. Mildly increased wall thickness. There is severely reduced systolic function with a visually estimated ejection fraction of 20 - 25%. Grade II diastolic dysfunction.    Right Ventricle: Normal right ventricular cavity size. Wall thickness is normal. Right ventricle wall motion  is normal. Systolic function is moderately reduced.    Left Atrium: Left atrium is severely dilated.    Aortic Valve: There is moderate aortic valve sclerosis. There is mild aortic regurgitation.    Mitral Valve: There is severe regurgitation.    Tricuspid Valve: There is mild regurgitation.    Pulmonary Artery: The estimated pulmonary artery systolic pressure is 45 mmHg.    IVC/SVC: Elevated venous pressure at 15 mmHg.  . Continue Beta Blocker and Furosemide and monitor clinical status closely. Monitor on telemetry. Patient is on CHF pathway.  Monitor strict Is&Os and daily weights.  Place on fluid restriction of 1.5 L. Cardiology has been consulted. Continue to stress to patient importance of self efficacy and  on diet for CHF. Last BNP reviewed- and noted below   Recent Labs   Lab 04/02/24  0817   *       -continue diuretic

## 2024-04-05 NOTE — ASSESSMENT & PLAN NOTE
-Flat  -Likely secondary to demand ischemia from new onset atrial flutter and combined CHF  -Continue ASA, BB, heparin gtt, statin  -Ischemic workup with LHC once better compensated    4/5/24  -s/p LHC which showed triple vessel disease  -Case discussed with CTS and patient/family, repeat LHC today with PCI. Dr. Hernandez explained all risks, benefits, and treatment alternatives. All questions answered. Patient agreeable with proceeding.

## 2024-04-05 NOTE — BRIEF OP NOTE
[facility] - Cath Lab (Hospital)  Brief Operative Note  Cardiology    SUMMARY     Surgery Date: 4/5/2024     Surgeon(s) and Role:     * Suzanna Hernandez MD - Primary    Assisting Surgeon: None    Pre-op Diagnosis:  Congestive heart failure, unspecified HF chronicity, unspecified heart failure type [I50.9]    Post-op Diagnosis: Post-Op Diagnosis Codes:     * Congestive heart failure, unspecified HF chronicity, unspecified heart failure type [I50.9]    Procedure Performed:     Procedure(s) (LRB):  Placement, IABP (N/A)  ANGIOGRAM, CORONARY ARTERY (N/A)    Technical Procedures Used:     Operative Findings:   Status post intra-aortic balloon pump insertion.    Hemodynamics improved.    On balloon pump.  Doing better.      In angiogram was repeated on the left side with patent stents, no changes compared to earlier today    We will monitor in the ICU.  Continue with aspirin, Plavix, IV heparin.        Estimated Blood Loss: * No values recorded between 4/5/2024  3:46 PM and 4/5/2024  4:12 PM *         Specimens:   Specimen (24h ago, onward)      None

## 2024-04-05 NOTE — NURSING
Rec'd to CVRU via bed NADN  A & O X 3  Neuro intact, assessment performed.  NS resumed at 150/ml/hr  Lymphadema noted to both legs but L> R  (+4pitting edema.  Leg swollen/red.  R femoral  w/sheath in place w/clear opsite in place.  Will check ACT in 90 min.  Instructed to keep r leg straight and flat.  Verbalized understanding.

## 2024-04-05 NOTE — NURSING
"Began to c/o "pressure on my chest) placing hand under sternum    Resp audible w/o stethescope  Sat dropped from95 to 88 within a minute.  O2 increased to 10L/min Dr Hernandez notified and now at bedside performing PE.  1510 Resp therapist at bedside placing bipap.   Ann [id number] placed.  Lasix and NTG  given earlier.  "

## 2024-04-05 NOTE — BRIEF OP NOTE
[facility] Lab (Hospital)  Brief Operative Note  Cardiology    SUMMARY     Surgery Date: 4/5/2024     Surgeon(s) and Role:     * Suzanna Hernandez MD - Primary    Assisting Surgeon: None    Pre-op Diagnosis:  Coronary artery disease involving native heart, unspecified vessel or lesion type, unspecified whether angina present [I25.10]  Acute congestive heart failure, unspecified heart failure type [I50.9]  Elevated troponin [R79.89]  Unstable angina pectoris [I20.0]  CAD S/P percutaneous coronary angioplasty [I25.10, Z98.61]    Post-op Diagnosis: Post-Op Diagnosis Codes:     * Coronary artery disease involving native heart, unspecified vessel or lesion type, unspecified whether angina present [I25.10]     * Acute congestive heart failure, unspecified heart failure type [I50.9]     * Elevated troponin [R79.89]     * Unstable angina pectoris [I20.0]     * CAD S/P percutaneous coronary angioplasty [I25.10, Z98.61]    Procedure Performed:     Procedure(s) (LRB):  Angiogram, Coronary, with Left Heart Cath (N/A)  Percutaneous coronary intervention (N/A)  LITHOTRIPSY, CORONARY TRANSLUMINAL, PERCUTANEOUS  Stent, Drug Eluting, Multi Vessel, Coronary    Technical Procedures Used:     Operative Findings:   STATUS POST PCI TO THE MID LEFT CIRC X1.  PATENT BRANCHING OM.    STATUS POST PCI TO THE MID LAD STATUS POST 1.  PATENT BRANCHING 2ND DIAGONAL.  APPEARED ACTUALLY TODAY TO HAVE AT THE MOST ABOUT 40-50% MID STENOSIS.  LEFT-TO-RIGHT COLLATERALS MAINTAINED AND INTACT AFTER END OF THE PROCEDURE.    NO COMPLICATIONS    START PLAVIX, WE WILL NEED ELIQUIS ON DISCHARGE.    FOR NOW RESUME HEPARIN 2 HOURS AFTER SHEATH PULL.    SUCCESSFUL PCI.    CONTINUE WITH ASPIRIN.        Estimated Blood Loss: * No values recorded between 4/5/2024 12:50 PM and 4/5/2024  1:55 PM *         Specimens:   Specimen (24h ago, onward)      None

## 2024-04-05 NOTE — CONSULTS
Life Vest approved per Kenn with [employer]. Requested to have patient fitted tomorrow after confirming with cardiology.

## 2024-04-05 NOTE — ASSESSMENT & PLAN NOTE
- mgmt per cards  - currently on BB, lasix, and spironolactone   - trend hemodynamics and adjust meds as needed

## 2024-04-05 NOTE — ASSESSMENT & PLAN NOTE
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

## 2024-04-05 NOTE — PLAN OF CARE
Ongoing (interventions implemented as appropriate)  Pt is alert and oriented.   VSS  Pt able to make needs known.  Pt remained afebrile throughout this shift.   Pt remained free of falls this shift.   Pt denies pain this shift.  Plan of care reviewed. Patient verbalizes understanding.   Pt moving/turing independent. Frequent weight shifting encouraged.  Patient normal sinus rhythm with 1st degree AVB lt. BBB on monitor.   Bed low, side rails up x 2, wheels locked, call light in reach.   Hourly rounding completed.   Will continue to observe.

## 2024-04-05 NOTE — PROGRESS NOTES
[facility] ([facility] Medicine  Progress Note    Patient Name: Chucho Clark  MRN: [id number]  Patient Class: IP- Inpatient   Admission Date: 4/2/2024  Length of Stay: 2 days  Attending Physician: Francy Bruno MD  Primary Care Provider: Ale, Primary Doctor        Subjective:     Principal Problem:Acute on chronic congestive heart failure        HPI:  78 y.o. male patient, PMHx: Diastolic HF, HTN, DVT, CAD. Presented to the Emergency Department for evaluation of CP which onset night PTA. Pt visited Dr. Corrales (Cardiology) for the first time on 4/1/24 and was initiated on Eliquis. Pt notes that he also felt some upper abd pain PTA that felt like acid reflux. Pt and son originally thought sxs were cold/flu sxs, but tested negative in the clinic. Symptoms are constant and moderate in severity. No mitigating or exacerbating factors reported. Associated sxs include SOB, palpitations, and increased HR. Patient denies any cough, numbness, HA, fever, n/v/d, and all other sxs at this time. Pt has not had a stress test. In the ED, vitals: 130/69, 121, 22, 97.8F, 95% RA. Significant Labs: BNP: 560, Trop: 0.064, Bili: 1.1. CXR: interstitial edema. EKG: Neg for STEMI. Cardiology consulted in ED. Treated with ASA, Nitro, BB, diuretic. Initiated on Heparin Infusion. Patient is a full code. Placed in observation under the care of Hospital Medicine for management of elevated troponin, ACS rule out.     Overview/Hospital Course:  The patient presented with chest pain and shortness of breath. He reported he had been unable to lay flat for 2 weeks prior to presentation. He was noted to have bilateral LE edema as well. Workup revealed elevated troponin and volume overload. He was placed on IV diuretics. Cardiology was consulted. Echo revealed reduced EF. He diuresed well. Left heart cath revealed severe triple vessel disease. He underwent RODERICK/DCCV and sinus rhythm restored. The patient going for repeat [facility]  4/5.    Interval History: f/u CAD patient reports he is feeling better. Awaiting repeat [facility]    Review of Systems  Objective:     Vital Signs (Most Recent):  Temp: 97.3 °F (36.3 °C) (04/05/24 0742)  Pulse: 75 (04/05/24 0742)  Resp: 18 (04/05/24 0742)  BP: 104/66 (04/05/24 0742)  SpO2: (!) 94 % (04/05/24 0742) Vital Signs (24h Range):  Temp:  [97.1 °F (36.2 °C)-97.9 °F (36.6 °C)] 97.3 °F (36.3 °C)  Pulse:  [70-84] 75  Resp:  [17-21] 18  SpO2:  [93 %-99 %] 94 %  BP: ()/(46-77) 104/66     Weight: 129.7 kg (286 lb)  Body mass index is 39.89 kg/m².    Intake/Output Summary (Last 24 hours) at 4/5/2024 1322  Last data filed at 4/5/2024 0414  Gross per 24 hour   Intake 0 ml   Output 650 ml   Net -650 ml         Physical Exam  HENT:      Head: Normocephalic and atraumatic.   Cardiovascular:      Rate and Rhythm: Normal rate and regular rhythm.      Heart sounds: No murmur heard.  Pulmonary:      Effort: Pulmonary effort is normal. No respiratory distress.      Breath sounds: Normal breath sounds. No wheezing.   Abdominal:      General: Bowel sounds are normal. There is no distension.      Palpations: Abdomen is soft.      Tenderness: There is no abdominal tenderness.   Musculoskeletal:         General: Swelling present.      Right lower leg: Edema present.      Left lower leg: Edema present.   Skin:     General: Skin is warm and dry.   Neurological:      Mental Status: He is alert and oriented to person, place, and time. Mental status is at baseline.             Significant Labs: All pertinent labs within the past 24 hours have been reviewed.  CBC:   Recent Labs   Lab 04/04/24 0520 04/05/24 0537   WBC 4.28 4.16   HGB 13.8* 13.7*   HCT 41.8 41.0    182     CMP:   Recent Labs   Lab 04/04/24  0520 04/04/24  1744 04/05/24 0537    136 136   K 3.2* 3.8 3.6   CL 97 100 100   CO2 32* 26 27   GLU 88 91 92   BUN 29* 23 25*   CREATININE 1.4 1.2 1.3   CALCIUM 9.3 8.7 9.0   PROT 6.7  --   --    ALBUMIN 3.2*  --   --     BILITOT 0.9  --   --    ALKPHOS 58  --   --    AST 17  --   --    ALT 15  --   --    ANIONGAP 11 10 9       Significant Imaging: I have reviewed all pertinent imaging results/findings within the past 24 hours.    Assessment/Plan:      * Acute on chronic congestive heart failure  Patient is identified as having Combined Systolic and Diastolic heart failure that is Acute on chronic. CHF is currently uncontrolled due to >3 pillow orthopnea and Rales/crackles on pulmonary exam. Latest ECHO performed and demonstrates- Results for orders placed during the hospital encounter of 04/02/24    Echo Saline Bubble? No    Interpretation Summary    Left Ventricle: The left ventricle is moderately dilated. Mildly increased wall thickness. There is severely reduced systolic function with a visually estimated ejection fraction of 20 - 25%. Grade II diastolic dysfunction.    Right Ventricle: Normal right ventricular cavity size. Wall thickness is normal. Right ventricle wall motion  is normal. Systolic function is moderately reduced.    Left Atrium: Left atrium is severely dilated.    Aortic Valve: There is moderate aortic valve sclerosis. There is mild aortic regurgitation.    Mitral Valve: There is severe regurgitation.    Tricuspid Valve: There is mild regurgitation.    Pulmonary Artery: The estimated pulmonary artery systolic pressure is 45 mmHg.    IVC/SVC: Elevated venous pressure at 15 mmHg.  . Continue Beta Blocker and Furosemide and monitor clinical status closely. Monitor on telemetry. Patient is on CHF pathway.  Monitor strict Is&Os and daily weights.  Place on fluid restriction of 1.5 L. Cardiology has been consulted. Continue to stress to patient importance of self efficacy and  on diet for CHF. Last BNP reviewed- and noted below   Recent Labs   Lab 04/02/24  0817   *       -continue diuretic        Atrial flutter  S/p RODERICK/DCCV      Orthopnea  --Continue diuresis    Chest pain  -continue  diuresis  -currently chest pain free  -plan LHC with PCI placement today      Elevated troponin  --repeat LHC with PCI planned 4/5        Venous stasis dermatitis of both lower extremities  Chronic venous stasis. Worsened appearance of BLE, likely secondary to inability to elevate legs over the past 2 weeks due to orthopnea.     --Cont diuresis      Essential hypertension  Chronic, . Latest blood pressure and vitals reviewed-     Temp:  [97.5 °F (36.4 °C)-98.6 °F (37 °C)]   Pulse:  []   Resp:  [17-20]   BP: ()/(53-66)   SpO2:  [92 %-96 %] .   Home meds for hypertension were reviewed and noted below.   Hypertension Medications               metoprolol succinate (TOPROL-XL) 25 MG 24 hr tablet Take 1 tablet (25 mg total) by mouth once daily.    torsemide (DEMADEX) 20 MG Tab Take 1 tablet (20 mg total) by mouth 2 (two) times a day.            While in the hospital, will manage blood pressure as follows; Continue home antihypertensive regimen    Will utilize p.r.n. blood pressure medication only if patient's blood pressure greater than 160/100 and he develops symptoms such as worsening chest pain or shortness of breath.      VTE Risk Mitigation (From admission, onward)           Ordered     heparin, porcine (PF) injection  As needed (PRN)         04/05/24 1255     heparin 25,000 units in dextrose 5% (100 units/ml) IV bolus from bag LOW INTENSITY nomogram - [facility]  As needed (PRN)        Question:  Heparin Infusion Adjustment (DO NOT MODIFY ANSWER)  Answer:  \\[email or web address]\epic\Images\Pharmacy\HeparinInfusions\heparin LOW INTENSITY nomogram for [facility] [id number]    04/02/24 1109     heparin 25,000 units in dextrose 5% (100 units/ml) IV bolus from bag LOW INTENSITY nomogram - [facility]  As needed (PRN)        Question:  Heparin Infusion Adjustment (DO NOT MODIFY ANSWER)  Answer:  \\[email or web address]\epic\Images\Pharmacy\HeparinInfusions\heparin LOW INTENSITY nomogram for [facility] [id number]    04/02/24 1109     Reason for No  Pharmacological VTE Prophylaxis  Once        Comments: Heparin Infusion   Question:  Reasons:  Answer:  Physician Provided (leave comment)    04/02/24 1153     IP VTE HIGH RISK PATIENT  Once         04/02/24 1153     Place sequential compression device  Until discontinued         04/02/24 1153     heparin 25,000 units in dextrose 5% 250 mL (100 units/mL) infusion LOW INTENSITY nomogram - [facility]  Continuous        Question:  Begin at (units/kg/hr)  Answer:  12    04/02/24 1109                    Discharge Planning   ZOEY:      Code Status: Full Code   Is the patient medically ready for discharge?:     Reason for patient still in hospital (select all that apply): Patient trending condition, Treatment, and Consult recommendations  Discharge Plan A: Home with family                  Francy Bruno MD  Department of Hospital Medicine   [facility] ([facility])

## 2024-04-05 NOTE — INTERVAL H&P NOTE
The patient has been examined and the H&P has been reviewed:    I concur with the findings and no changes have occurred since H&P was written.    Anesthesia/Surgery risks, benefits and alternative options discussed and understood by patient/family.      DECOMPENSATED IN RECOVERY WITH PULMONARY EDEMA  EKG NO NEW DYNAMIC CHANGES  DISCUSSED WITH FAMILY TO PLACE AN IABP   IV LASIX GIVEN, NITRO SL AND PLACED ON BIPAP       Active Hospital Problems    Diagnosis  POA    *Acute on chronic congestive heart failure [I50.9]  Yes    Atrial flutter [I48.92]  Yes    Elevated troponin [R79.89]  Yes    Chest pain [R07.9]  Yes    Orthopnea [R06.01]  Yes    Essential hypertension [I10]  Yes    Venous stasis dermatitis of both lower extremities [I87.2]  Yes      Resolved Hospital Problems   No resolved problems to display.

## 2024-04-05 NOTE — ASSESSMENT & PLAN NOTE
Patient is identified as having Diastolic (HFpEF) heart failure that is Acute. CHF is currently uncontrolled due to Pulmonary edema/pleural effusion on CXR. Latest ECHO performed and demonstrates- No results found for this or any previous visit.  . Continue Furosemide and monitor clinical status closely. Monitor on telemetry. Patient is on CHF pathway.  Monitor strict Is&Os and daily weights.  Place on fluid restriction of 2 L. Cardiology has been consulted. Continue to stress to patient importance of self efficacy and  on diet for CHF. Last BNP reviewed- and noted below   Recent Labs   Lab 04/02/24  0817   *     Cw iv lasix     4/3/24  -TTE with EF of 20-25%, moderately reduced RV function, pulmonary HTN, severe MR  -Continue IV diuresis  -Continue BB  -Will add ARB vs Entresto pending creatinine/BP trend  -Strict I's/O's  -Probable R/LHC tmw pending reassessment    4.4.2024  Right and left heart catheterization today.    Lasix held due to drop in pressure yesterday evening.    Discussed risks and benefits with the family, son and daughter at bedside.    4/5/24  -More SOB this AM with cough/orthopnea  -IV Lasix x one dose  -Continue Aldactone, BB

## 2024-04-05 NOTE — NURSING
Cath team at bedside  Dr Hernandez taking patient back to cath lab to place on balloon pump.  MD spoke w/ son and dtr to explain/update.  Noting clear pale urine in murray bag.  Sat 98

## 2024-04-05 NOTE — ASSESSMENT & PLAN NOTE
- affecting medical decision making and complicating the above   - pt would benefit greatly from weight loss and lifestyle modifications

## 2024-04-05 NOTE — SUBJECTIVE & OBJECTIVE
Review of Systems   Constitutional: Positive for malaise/fatigue.   HENT: Negative.     Eyes: Negative.    Cardiovascular:  Positive for dyspnea on exertion, leg swelling and orthopnea.   Respiratory:  Positive for cough and shortness of breath.    Endocrine: Negative.    Hematologic/Lymphatic: Negative.    Skin: Negative.    Musculoskeletal: Negative.    Gastrointestinal: Negative.    Genitourinary: Negative.    Neurological: Negative.    Psychiatric/Behavioral: Negative.     Allergic/Immunologic: Negative.      Objective:     Vital Signs (Most Recent):  Temp: 97.3 °F (36.3 °C) (04/05/24 0742)  Pulse: 75 (04/05/24 0742)  Resp: 18 (04/05/24 0742)  BP: 104/66 (04/05/24 0742)  SpO2: (!) 94 % (04/05/24 0742) Vital Signs (24h Range):  Temp:  [97.1 °F (36.2 °C)-97.9 °F (36.6 °C)] 97.3 °F (36.3 °C)  Pulse:  [70-84] 75  Resp:  [17-21] 18  SpO2:  [93 %-99 %] 94 %  BP: ()/(46-77) 104/66     Weight: 129.7 kg (286 lb)  Body mass index is 39.89 kg/m².     SpO2: (!) 94 %         Intake/Output Summary (Last 24 hours) at 4/5/2024 1220  Last data filed at 4/5/2024 0414  Gross per 24 hour   Intake 0 ml   Output 650 ml   Net -650 ml       Lines/Drains/Airways       Peripheral Intravenous Line  Duration                  Peripheral IV - Single Lumen 04/02/24 0813 20 G Posterior;Right Forearm 3 days         Peripheral IV - Single Lumen 04/03/24 1700 22 G Posterior;Right Hand 1 day                       Physical Exam  Vitals and nursing note reviewed.   Constitutional:       General: He is not in acute distress.     Appearance: Normal appearance. He is well-developed. He is not diaphoretic.   HENT:      Head: Normocephalic and atraumatic.   Eyes:      General:         Right eye: No discharge.         Left eye: No discharge.      Pupils: Pupils are equal, round, and reactive to light.   Neck:      Thyroid: No thyromegaly.      Vascular: No JVD.      Trachea: No tracheal deviation.   Cardiovascular:      Rate and Rhythm: Normal  "rate and regular rhythm.      Heart sounds: S1 normal and S2 normal. Murmur heard.      High-pitched blowing holosystolic murmur is present at the apex.   Pulmonary:      Effort: Pulmonary effort is normal.   Abdominal:      General: There is no distension.   Musculoskeletal:      Cervical back: Neck supple.      Right lower leg: Edema present.      Left lower leg: Edema present.   Skin:     General: Skin is warm and dry.      Findings: No erythema.   Neurological:      General: No focal deficit present.      Mental Status: He is alert and oriented to person, place, and time.   Psychiatric:         Mood and Affect: Mood normal.         Behavior: Behavior normal.            Significant Labs: CMP   Recent Labs   Lab 04/04/24  0520 04/04/24  1744 04/05/24  0537    136 136   K 3.2* 3.8 3.6   CL 97 100 100   CO2 32* 26 27   GLU 88 91 92   BUN 29* 23 25*   CREATININE 1.4 1.2 1.3   CALCIUM 9.3 8.7 9.0   PROT 6.7  --   --    ALBUMIN 3.2*  --   --    BILITOT 0.9  --   --    ALKPHOS 58  --   --    AST 17  --   --    ALT 15  --   --    ANIONGAP 11 10 9   , CBC   Recent Labs   Lab 04/04/24  0520 04/05/24  0537   WBC 4.28 4.16   HGB 13.8* 13.7*   HCT 41.8 41.0    182   , and Troponin No results for input(s): "TROPONINI" in the last 48 hours.    Significant Imaging: Echocardiogram: Transthoracic echo (TTE) complete (Cupid Only):   Results for orders placed or performed during the hospital encounter of 04/02/24   Echo   Result Value Ref Range    BSA 2.55 m2    LVOT stroke volume 61.14 cm3    LVIDd 5.50 3.5 - 6.0 cm    LV Systolic Volume 93.48 mL    LV Systolic Volume Index 38.0 mL/m2    LVIDs 4.52 (A) 2.1 - 4.0 cm    LV Diastolic Volume 147.39 mL    LV Diastolic Volume Index 59.91 mL/m2    IVS 1.70 (A) 0.6 - 1.1 cm    LVOT diameter 2.03 cm    LVOT area 3.2 cm2    FS 18 (A) 28 - 44 %    Left Ventricle Relative Wall Thickness 0.49 cm    Posterior Wall 1.35 (A) 0.6 - 1.1 cm    LV mass 382.20 g    LV Mass Index 155 g/m2 "    MV Peak E Guru 1.29 m/s    MV Peak A Guru 0.49 m/s    TR Max Guru 3.07 m/s    E/A ratio 2.63     E wave deceleration time 154.04 msec    LVOT peak guru 1.01 m/s    Left Ventricular Outflow Tract Mean Velocity 0.94 cm/s    Left Ventricular Outflow Tract Mean Gradient 3.53 mmHg    TAPSE 2.25 cm    LA size 5.05 cm    Left Atrium Minor Axis 7.53 cm    Left Atrium Major Axis 7.28 cm    RA Major Axis 5.96 cm    AV regurgitation pressure 1/2 time [id number] ms    AR Max Guru 2.83 m/s    AV mean gradient 8 mmHg    AV peak gradient 13 mmHg    Ao peak guru 1.81 m/s    Ao VTI 33.00 cm    LVOT peak VTI 18.90 cm    AV valve area 1.85 cm²    AV Velocity Ratio 0.56     AV index (prosthetic) 0.57     ZENIA by Velocity Ratio 1.81 cm²    Mr max guru 4.90 m/s    MV mean gradient 4 mmHg    MV peak gradient 12 mmHg    MV stenosis pressure 1/2 time 44.67 ms    MV valve area p 1/2 method 4.93 cm2    MV valve area by continuity eq 1.66 cm2    MV VTI 36.9 cm    TV mean gradient 33 mmHg    Triscuspid Valve Regurgitation Peak Gradient 38 mmHg    Ao root annulus 3.51 cm    STJ 3.32 cm    Ascending aorta 3.26 cm    IVC diameter 2.72 cm    ZLVIDS -4.00     ZLVIDD -8.28     LA Volume Index 69.8 mL/m2    LA volume 171.60 cm3    LA WIDTH 5.4 cm    RA Width 3.8 cm    TV resting pulmonary artery pressure 41 mmHg    RV TB RVSP 6 mmHg    Est. RA pres 3 mmHg    Narrative      Left Ventricle: The left ventricle is severely dilated. Mildly   increased wall thickness. There is moderately reduced systolic function   with a visually estimated ejection fraction of 30 - 35%. There is   diastolic dysfunction.    Right Ventricle: Normal right ventricular cavity size. Wall thickness   is normal. Right ventricle wall motion  is normal. Systolic function is   normal.    Aortic Valve: There is mild aortic regurgitation.    Mitral Valve: There is severe regurgitation.    Pulmonary Artery: The estimated pulmonary artery systolic pressure is   41 mmHg.    IVC/SVC:  Normal venous pressure at 3 mmHg.      and EKG: Reviewed

## 2024-04-05 NOTE — SUBJECTIVE & OBJECTIVE
Past Medical History:   Diagnosis Date    CHF (congestive heart failure)     DVT (deep venous thrombosis)     Hypertension     Renal disorder        Past Surgical History:   Procedure Laterality Date    ABDOMINAL AORTOGRAPHY  4/4/2024    Procedure: ANGIOGRAM, ABDOMINAL AORTA;  Surgeon: Suzanna Hernandez MD;  Location: [facility] CATH LAB;  Service: Cardiology;;    ANGIOGRAM, CORONARY, WITH LEFT HEART CATHETERIZATION N/A 4/4/2024    Procedure: Angiogram, Coronary, with Left Heart Cath;  Surgeon: Suzanna Hernandez MD;  Location: [facility] CATH LAB;  Service: Cardiology;  Laterality: N/A;    ARTERIOGRAPHY OF SUBCLAVIAN ARTERY  4/4/2024    Procedure: ARTERIOGRAM, SUBCLAVIAN;  Surgeon: Suzanna Hernandez MD;  Location: [facility] CATH LAB;  Service: Cardiology;;    RIGHT HEART CATHETERIZATION Right 4/4/2024    Procedure: INSERTION, CATHETER, RIGHT HEART;  Surgeon: Suzanna Hernandez MD;  Location: [facility] CATH LAB;  Service: Cardiology;  Laterality: Right;    TRANSESOPHAGEAL ECHOCARDIOGRAM WITH POSSIBLE CARDIOVERSION (RODERICK W/ POSS CARDIOVERSION) N/A 4/4/2024    Procedure: Transesophageal echo (RODERICK) intra-procedure log documentation;  Surgeon: Suzanna Hernandez MD;  Location: [facility] CATH LAB;  Service: Cardiology;  Laterality: N/A;  After [facility]       Review of patient's allergies indicates:   Allergen Reactions    Vancomycin Hives     Patient given vancomycin on 7/7/2020 developed hives.  Vancomycin added to allergy list.       Family History       Problem Relation (Age of Onset)    Cancer Mother    Diabetes Mother          Tobacco Use    Smoking status: Former    Smokeless tobacco: Former   Substance and Sexual Activity    Alcohol use: Yes    Drug use: Never    Sexual activity: Not on file         Review of Systems   Respiratory:  Negative for cough, shortness of breath and wheezing.    Cardiovascular:  Positive for leg swelling. Negative for chest pain.   Gastrointestinal:  Negative for abdominal pain, nausea and vomiting.   Genitourinary:          Dry mouth from NIPPV   All other systems reviewed and are negative.    Objective:     Vital Signs (Most Recent):  Temp: 97.9 °F (36.6 °C) (04/05/24 1645)  Pulse: 98 (04/05/24 1516)  Resp: (!) 42 (04/05/24 1516)  BP: (!) 183/101 (04/05/24 1516)  SpO2: (!) 92 % (04/05/24 1516) Vital Signs (24h Range):  Temp:  [97.1 °F (36.2 °C)-97.9 °F (36.6 °C)] 97.9 °F (36.6 °C)  Pulse:  [70-98] 98  Resp:  [17-42] 42  SpO2:  [88 %-97 %] 92 %  BP: (104-183)/() 183/101     Weight: 129.7 kg (286 lb)  Body mass index is 39.89 kg/m².      Intake/Output Summary (Last 24 hours) at 4/5/2024 1700  Last data filed at 4/5/2024 1652  Gross per 24 hour   Intake 0 ml   Output 2560 ml   Net -2560 ml        Physical Exam  Vitals reviewed.   Constitutional:       General: He is awake. He is not in acute distress.     Appearance: He is obese.   Cardiovascular:      Rate and Rhythm: Normal rate.      Pulses:           Radial pulses are 2+ on the right side and 2+ on the left side.      Comments: IABP in place  Pulmonary:      Effort: Pulmonary effort is normal.      Breath sounds: Normal breath sounds. No wheezing.      Comments: On NIPPV, weaned to 35% FiO2 on my exam with sat of 97%  Abdominal:      General: There is no distension.      Palpations: Abdomen is soft.      Comments: Oebse abd   Musculoskeletal:      Right lower leg: Edema present.      Left lower leg: Edema present.      Comments: GONZALEZ, RLE immobilized in place for IABP   Skin:     General: Skin is warm and dry.   Neurological:      General: No focal deficit present.      Mental Status: He is alert.   Psychiatric:         Behavior: Behavior is cooperative.          Vents:  Oxygen Concentration (%): 50 (04/05/24 1542)    Lines/Drains/Airways       Drain  Duration                  Urethral Catheter 04/05/24 1600 <1 day              Line  Duration                  IABP 04/05/24 1546 <1 day              Peripheral Intravenous Line  Duration                  Peripheral IV -  Single Lumen 04/02/24 0813 20 G Posterior;Right Forearm 3 days         Peripheral IV - Single Lumen 04/03/24 1700 22 G Posterior;Right Hand 2 days         Peripheral IV - Single Lumen 04/05/24 1609 20 G Anterior;Left Forearm <1 day                    Significant Labs:    CBC/Anemia Profile:  Recent Labs   Lab 04/04/24  0520 04/05/24  0537   WBC 4.28 4.16   HGB 13.8* 13.7*   HCT 41.8 41.0    182   MCV 97 97   RDW 13.4 13.6        Chemistries:  Recent Labs   Lab 04/04/24  0520 04/04/24  1744 04/05/24  0537    136 136   K 3.2* 3.8 3.6   CL 97 100 100   CO2 32* 26 27   BUN 29* 23 25*   CREATININE 1.4 1.2 1.3   CALCIUM 9.3 8.7 9.0   ALBUMIN 3.2*  --   --    PROT 6.7  --   --    BILITOT 0.9  --   --    ALKPHOS 58  --   --    ALT 15  --   --    AST 17  --   --    MG 1.8  --  1.8   PHOS 4.2  --  4.0       All pertinent labs within the past 24 hours have been reviewed.    Significant Imaging:   I have reviewed all pertinent imaging results/findings within the past 24 hours.

## 2024-04-05 NOTE — ASSESSMENT & PLAN NOTE
Heparin iv   Trend trop   Echo   Aspirin   Will need ischemic work up prior to discharge  Cw toprol    4/3/24  -Stable, troponin flat  -Continue ASA, BB, statin, heparin gtt  -Probable LHC tmw pending reassessment of volume status    4/5/24  -Multivessel CAD on cath

## 2024-04-05 NOTE — PROGRESS NOTES
[facility] - Med Surg 3  Cardiology  Progress Note    Patient Name: Chucho Clark  MRN: [id number]  Admission Date: 4/2/2024  Hospital Length of Stay: 2 days  Code Status: Full Code   Attending Physician: Francy Bruno MD   Primary Care Physician: Ale, Primary Doctor  Expected Discharge Date:   Principal Problem:Acute on chronic congestive heart failure    Subjective:   HPI:  78 y.o. male patient, PMHx: Diastolic HF, HTN, DVT, CAD. Presented to the Emergency Department for evaluation of CP which onset night PTA. Pt visited Dr. Corrales (Cardiology) for the first time on 4/1/24 and was initiated on Eliquis. Pt notes that he also felt some upper abd pain PTA that felt like acid reflux. Pt and son originally thought sxs were cold/flu sxs, but tested negative in the clinic. Symptoms are constant and moderate in severity. No mitigating or exacerbating factors reported. Associated sxs include SOB, palpitations, and increased HR. Patient denies any cough, numbness, HA, fever, n/v/d, and all other sxs at this time. Pt has not had a stress test. In the ED, vitals: 130/69, 121, 22, 97.8F, 95% RA. Significant Labs: BNP: 560, Trop: 0.064, Bili: 1.1. CXR: interstitial edema. EKG: Neg for STEMI. Cardiology consulted in ED. Treated with ASA, Nitro, BB, diuretic. Initiated on Heparin Infusion. Patient is a full code. Placed in observation under the care of Hospital Medicine for management of elevated troponin, ACS rule out.     Hospital Course:   4/3/24-Patient seen and examined today, resting in bed. Feeling better. Less SOB, diuresed well overnight. Still has significant BLE edema. Labs reviewed/stable. EKG with aflutter. TTE with EF of 20-25%, decreased RV function, severe MR. Troponin flat. Discussed ischemic workup possibly tmw pending clinical condition.     4.4.2024  Still in aflutter, swelling significantly improved   In bed, laying comfortably    4/5/24-Patient seen and examined today, s/p R/LHC yesterday which showed severe  triple vessel disease. Underwent RODERICK/DCCV with restoration of SR. Feels ok. Does have some increase in SOB/orthopnea. No CP symptoms. Remains in SR. Reviewed case with patient/CTS, will proceed with repeat LHC today and PCI. LifeVest ordered for SCD prevention.        Review of Systems   Constitutional: Positive for malaise/fatigue.   HENT: Negative.     Eyes: Negative.    Cardiovascular:  Positive for dyspnea on exertion, leg swelling and orthopnea.   Respiratory:  Positive for cough and shortness of breath.    Endocrine: Negative.    Hematologic/Lymphatic: Negative.    Skin: Negative.    Musculoskeletal: Negative.    Gastrointestinal: Negative.    Genitourinary: Negative.    Neurological: Negative.    Psychiatric/Behavioral: Negative.     Allergic/Immunologic: Negative.      Objective:     Vital Signs (Most Recent):  Temp: 97.3 °F (36.3 °C) (04/05/24 0742)  Pulse: 75 (04/05/24 0742)  Resp: 18 (04/05/24 0742)  BP: 104/66 (04/05/24 0742)  SpO2: (!) 94 % (04/05/24 0742) Vital Signs (24h Range):  Temp:  [97.1 °F (36.2 °C)-97.9 °F (36.6 °C)] 97.3 °F (36.3 °C)  Pulse:  [70-84] 75  Resp:  [17-21] 18  SpO2:  [93 %-99 %] 94 %  BP: ()/(46-77) 104/66     Weight: 129.7 kg (286 lb)  Body mass index is 39.89 kg/m².     SpO2: (!) 94 %         Intake/Output Summary (Last 24 hours) at 4/5/2024 1220  Last data filed at 4/5/2024 0414  Gross per 24 hour   Intake 0 ml   Output 650 ml   Net -650 ml       Lines/Drains/Airways       Peripheral Intravenous Line  Duration                  Peripheral IV - Single Lumen 04/02/24 0813 20 G Posterior;Right Forearm 3 days         Peripheral IV - Single Lumen 04/03/24 1700 22 G Posterior;Right Hand 1 day                       Physical Exam  Vitals and nursing note reviewed.   Constitutional:       General: He is not in acute distress.     Appearance: Normal appearance. He is well-developed. He is not diaphoretic.   HENT:      Head: Normocephalic and atraumatic.   Eyes:      General:          "Right eye: No discharge.         Left eye: No discharge.      Pupils: Pupils are equal, round, and reactive to light.   Neck:      Thyroid: No thyromegaly.      Vascular: No JVD.      Trachea: No tracheal deviation.   Cardiovascular:      Rate and Rhythm: Normal rate and regular rhythm.      Heart sounds: S1 normal and S2 normal. Murmur heard.      High-pitched blowing holosystolic murmur is present at the apex.   Pulmonary:      Effort: Pulmonary effort is normal.   Abdominal:      General: There is no distension.   Musculoskeletal:      Cervical back: Neck supple.      Right lower leg: Edema present.      Left lower leg: Edema present.   Skin:     General: Skin is warm and dry.      Findings: No erythema.   Neurological:      General: No focal deficit present.      Mental Status: He is alert and oriented to person, place, and time.   Psychiatric:         Mood and Affect: Mood normal.         Behavior: Behavior normal.            Significant Labs: CMP   Recent Labs   Lab 04/04/24  0520 04/04/24  1744 04/05/24  0537    136 136   K 3.2* 3.8 3.6   CL 97 100 100   CO2 32* 26 27   GLU 88 91 92   BUN 29* 23 25*   CREATININE 1.4 1.2 1.3   CALCIUM 9.3 8.7 9.0   PROT 6.7  --   --    ALBUMIN 3.2*  --   --    BILITOT 0.9  --   --    ALKPHOS 58  --   --    AST 17  --   --    ALT 15  --   --    ANIONGAP 11 10 9   , CBC   Recent Labs   Lab 04/04/24  0520 04/05/24  0537   WBC 4.28 4.16   HGB 13.8* 13.7*   HCT 41.8 41.0    182   , and Troponin No results for input(s): "TROPONINI" in the last 48 hours.    Significant Imaging: Echocardiogram: Transthoracic echo (TTE) complete (Cupid Only):   Results for orders placed or performed during the hospital encounter of 04/02/24   Echo   Result Value Ref Range    BSA 2.55 m2    LVOT stroke volume 61.14 cm3    LVIDd 5.50 3.5 - 6.0 cm    LV Systolic Volume 93.48 mL    LV Systolic Volume Index 38.0 mL/m2    LVIDs 4.52 (A) 2.1 - 4.0 cm    LV Diastolic Volume 147.39 mL    LV " Diastolic Volume Index 59.91 mL/m2    IVS 1.70 (A) 0.6 - 1.1 cm    LVOT diameter 2.03 cm    LVOT area 3.2 cm2    FS 18 (A) 28 - 44 %    Left Ventricle Relative Wall Thickness 0.49 cm    Posterior Wall 1.35 (A) 0.6 - 1.1 cm    LV mass 382.20 g    LV Mass Index 155 g/m2    MV Peak E Guru 1.29 m/s    MV Peak A Guru 0.49 m/s    TR Max Guru 3.07 m/s    E/A ratio 2.63     E wave deceleration time 154.04 msec    LVOT peak guru 1.01 m/s    Left Ventricular Outflow Tract Mean Velocity 0.94 cm/s    Left Ventricular Outflow Tract Mean Gradient 3.53 mmHg    TAPSE 2.25 cm    LA size 5.05 cm    Left Atrium Minor Axis 7.53 cm    Left Atrium Major Axis 7.28 cm    RA Major Axis 5.96 cm    AV regurgitation pressure 1/2 time 709.28172530779419 ms    AR Max Guru 2.83 m/s    AV mean gradient 8 mmHg    AV peak gradient 13 mmHg    Ao peak guru 1.81 m/s    Ao VTI 33.00 cm    LVOT peak VTI 18.90 cm    AV valve area 1.85 cm²    AV Velocity Ratio 0.56     AV index (prosthetic) 0.57     ZENIA by Velocity Ratio 1.81 cm²    Mr max guru 4.90 m/s    MV mean gradient 4 mmHg    MV peak gradient 12 mmHg    MV stenosis pressure 1/2 time 44.67 ms    MV valve area p 1/2 method 4.93 cm2    MV valve area by continuity eq 1.66 cm2    MV VTI 36.9 cm    TV mean gradient 33 mmHg    Triscuspid Valve Regurgitation Peak Gradient 38 mmHg    Ao root annulus 3.51 cm    STJ 3.32 cm    Ascending aorta 3.26 cm    IVC diameter 2.72 cm    ZLVIDS -4.00     ZLVIDD -8.28     LA Volume Index 69.8 mL/m2    LA volume 171.60 cm3    LA WIDTH 5.4 cm    RA Width 3.8 cm    TV resting pulmonary artery pressure 41 mmHg    RV TB RVSP 6 mmHg    Est. RA pres 3 mmHg    Narrative      Left Ventricle: The left ventricle is severely dilated. Mildly   increased wall thickness. There is moderately reduced systolic function   with a visually estimated ejection fraction of 30 - 35%. There is   diastolic dysfunction.    Right Ventricle: Normal right ventricular cavity size. Wall thickness   is normal.  Right ventricle wall motion  is normal. Systolic function is   normal.    Aortic Valve: There is mild aortic regurgitation.    Mitral Valve: There is severe regurgitation.    Pulmonary Artery: The estimated pulmonary artery systolic pressure is   41 mmHg.    IVC/SVC: Normal venous pressure at 3 mmHg.      and EKG: Reviewed  Assessment and Plan:   Patient who presents with acute CHF/cardiomyopathy and new onset atrial flutter. Multivessel CAD on cath yesterday. Underwent RODERICK/DCCV with restoration of SR. Repeat LHC/PCI planned today by Dr. Hernandez.     * Acute on chronic congestive heart failure  Patient is identified as having Diastolic (HFpEF) heart failure that is Acute. CHF is currently uncontrolled due to Pulmonary edema/pleural effusion on CXR. Latest ECHO performed and demonstrates- No results found for this or any previous visit.  . Continue Furosemide and monitor clinical status closely. Monitor on telemetry. Patient is on CHF pathway.  Monitor strict Is&Os and daily weights.  Place on fluid restriction of 2 L. Cardiology has been consulted. Continue to stress to patient importance of self efficacy and  on diet for CHF. Last BNP reviewed- and noted below   Recent Labs   Lab 04/02/24  0817   *     Cw iv lasix     4/3/24  -TTE with EF of 20-25%, moderately reduced RV function, pulmonary HTN, severe MR  -Continue IV diuresis  -Continue BB  -Will add ARB vs Entresto pending creatinine/BP trend  -Strict I's/O's  -Probable R/LHC tmw pending reassessment    4.4.2024  Right and left heart catheterization today.    Lasix held due to drop in pressure yesterday evening.    Discussed risks and benefits with the family, son and daughter at bedside.    4/5/24  -More SOB this AM with cough/orthopnea  -IV Lasix x one dose  -Continue Aldactone, BB    Atrial flutter  -Currently in aflutter HR low 100's  -Contributing to volume status/cardiomyopathy  -Continue BB  -Start amiodarone drip  -Continue heparin  drip  -Will need RODERICK/DCCV post ischemic workup as well as EP evaluation to discuss ablation in future    4.4.2024  RODERICK cardioversion today after heart catheterization.    4/5/24  -Remains in SR s/p RODERICK/DCCV  -Amiodarone switched to po  -Continue BB  -Heparin gtt for now, will need Eliquis upon d/c    Orthopnea  -IV Lasix x one dose    Chest pain  Heparin iv   Trend trop   Echo   Aspirin   Will need ischemic work up prior to discharge  Cw toprol    4/3/24  -Stable, troponin flat  -Continue ASA, BB, statin, heparin gtt  -Probable LHC tmw pending reassessment of volume status    4/5/24  -Multivessel CAD on cath    Elevated troponin  -Flat  -Likely secondary to demand ischemia from new onset atrial flutter and combined CHF  -Continue ASA, BB, heparin gtt, statin  -Ischemic workup with LHC once better compensated    4/5/24  -s/p LHC which showed triple vessel disease  -Case discussed with CTS and patient/family, repeat LHC today with PCI. Dr. Hernandez explained all risks, benefits, and treatment alternatives. All questions answered. Patient agreeable with proceeding.     Essential hypertension  -Continue BB  -Monitor BP trend        VTE Risk Mitigation (From admission, onward)           Ordered     heparin 25,000 units in dextrose 5% (100 units/ml) IV bolus from bag LOW INTENSITY nomogram - [facility]  As needed (PRN)        Question:  Heparin Infusion Adjustment (DO NOT MODIFY ANSWER)  Answer:  [email or web address]\Images\[email or web address]\HeparinInfusions\heparin LOW INTENSITY nomogram for [facility] [id number]    04/02/24 1109     heparin 25,000 units in dextrose 5% (100 units/ml) IV bolus from bag LOW INTENSITY nomogram - [facility]  As needed (PRN)        Question:  Heparin Infusion Adjustment (DO NOT MODIFY ANSWER)  Answer:  \\[email or web address]\Images\Pharmacy\HeparinInfusions\heparin LOW INTENSITY nomogram for [facility] [id number]    04/02/24 1109     Reason for No Pharmacological VTE Prophylaxis  Once        Comments: Heparin Infusion   Question:   Reasons:  Answer:  Physician Provided (leave comment)    04/02/24 1153     IP VTE HIGH RISK PATIENT  Once         04/02/24 1153     Place sequential compression device  Until discontinued         04/02/24 1153     heparin 25,000 units in dextrose 5% 250 mL (100 units/mL) infusion LOW INTENSITY nomogram - [facility]  Continuous        Question:  Begin at (units/kg/hr)  Answer:  12    04/02/24 1109                    Roxi De Leon PA-C  Cardiology  [facility] - Med S[facility]

## 2024-04-05 NOTE — ASSESSMENT & PLAN NOTE
-continue diuresis  -currently chest pain free  -plan [facility] with PCI placement today

## 2024-04-05 NOTE — PROGRESS NOTES
Pharmacist Renal Dose Adjustment Note    Chucho Clark is a 78 y.o. male being treated with the medication famotidine.     Patient Data:    Vital Signs (Most Recent):  Temp: 97.9 °F (36.6 °C) (04/05/24 1645)  Pulse: (!) 218 (04/05/24 1800)  Resp: (!) 26 (04/05/24 1800)  BP: 135/81 (04/05/24 1800)  SpO2: (!) 93 % (04/05/24 1800) Vital Signs (72h Range):  Temp:  [97.1 °F (36.2 °C)-98.6 °F (37 °C)]   Pulse:  []   Resp:  [17-42]   BP: ()/()   SpO2:  [88 %-99 %]      Recent Labs   Lab 04/04/24  0520 04/04/24  1744 04/05/24  0537   CREATININE 1.4 1.2 1.3     Serum creatinine: 1.3 mg/dL 04/05/24 0537  Estimated creatinine clearance: 64.3 mL/min    Famotidine 20 mg oral daily will be changed to famotidine 20 mg oral twice daily per renal dose protocol for CrCl > 50 ml/min.     Thank you,  Pharmacist's Name: Simone Love

## 2024-04-05 NOTE — INTERVAL H&P NOTE
The patient has been examined and the H&P has been reviewed:    I concur with the findings and no changes have occurred since H&P was written.    Anesthesia/Surgery risks, benefits and alternative options discussed and understood by patient/family.  HIGH MORTALITY RISK FOR VALVE AND CABG SURGERY  DISCUSSED WITH PATIENT AND FAMILY   WILL PROCEED WITH PCI OF LAD AND CIRCUMFLEX         Active Hospital Problems    Diagnosis  POA    *Acute on chronic congestive heart failure [I50.9]  Yes    Atrial flutter [I48.92]  Yes    Elevated troponin [R79.89]  Yes    Chest pain [R07.9]  Yes    Orthopnea [R06.01]  Yes    Essential hypertension [I10]  Yes    Venous stasis dermatitis of both lower extremities [I87.2]  Yes      Resolved Hospital Problems   No resolved problems to display.

## 2024-04-06 PROBLEM — I44.7 LEFT BUNDLE BRANCH BLOCK: Status: ACTIVE | Noted: 2024-04-06

## 2024-04-06 PROBLEM — Z98.890 ON INTRA-AORTIC BALLOON PUMP ASSIST: Status: ACTIVE | Noted: 2024-04-06

## 2024-04-06 LAB
ALLENS TEST: ABNORMAL
ANION GAP SERPL CALC-SCNC: 10 MMOL/L (ref 8–16)
ANION GAP SERPL CALC-SCNC: 7 MMOL/L (ref 8–16)
APTT PPP: 41.8 SEC (ref 21–32)
APTT PPP: 49.5 SEC (ref 21–32)
APTT PPP: 89.1 SEC (ref 21–32)
BASOPHILS # BLD AUTO: 0.01 K/UL (ref 0–0.2)
BASOPHILS NFR BLD: 0.2 % (ref 0–1.9)
BUN SERPL-MCNC: 21 MG/DL (ref 8–23)
BUN SERPL-MCNC: 21 MG/DL (ref 8–23)
CALCIUM SERPL-MCNC: 9 MG/DL (ref 8.7–10.5)
CALCIUM SERPL-MCNC: 9.1 MG/DL (ref 8.7–10.5)
CHLORIDE SERPL-SCNC: 98 MMOL/L (ref 95–110)
CHLORIDE SERPL-SCNC: 99 MMOL/L (ref 95–110)
CO2 SERPL-SCNC: 30 MMOL/L (ref 23–29)
CO2 SERPL-SCNC: 32 MMOL/L (ref 23–29)
CREAT SERPL-MCNC: 1.1 MG/DL (ref 0.5–1.4)
CREAT SERPL-MCNC: 1.1 MG/DL (ref 0.5–1.4)
DELSYS: ABNORMAL
DIFFERENTIAL METHOD BLD: ABNORMAL
EOSINOPHIL # BLD AUTO: 0 K/UL (ref 0–0.5)
EOSINOPHIL NFR BLD: 0.9 % (ref 0–8)
ERYTHROCYTE [DISTWIDTH] IN BLOOD BY AUTOMATED COUNT: 13.6 % (ref 11.5–14.5)
EST. GFR  (NO RACE VARIABLE): >60 ML/MIN/1.73 M^2
EST. GFR  (NO RACE VARIABLE): >60 ML/MIN/1.73 M^2
FIO2: 28
FLOW: 2
GLUCOSE SERPL-MCNC: 87 MG/DL (ref 70–110)
GLUCOSE SERPL-MCNC: 96 MG/DL (ref 70–110)
HCO3 UR-SCNC: 35.6 MMOL/L (ref 24–28)
HCT VFR BLD AUTO: 37.2 % (ref 40–54)
HGB BLD-MCNC: 12.3 G/DL (ref 14–18)
IMM GRANULOCYTES # BLD AUTO: 0.01 K/UL (ref 0–0.04)
IMM GRANULOCYTES NFR BLD AUTO: 0.2 % (ref 0–0.5)
LACTATE SERPL-SCNC: 0.8 MMOL/L (ref 0.5–2.2)
LYMPHOCYTES # BLD AUTO: 0.6 K/UL (ref 1–4.8)
LYMPHOCYTES NFR BLD: 13.7 % (ref 18–48)
MAGNESIUM SERPL-MCNC: 1.8 MG/DL (ref 1.6–2.6)
MCH RBC QN AUTO: 31.9 PG (ref 27–31)
MCHC RBC AUTO-ENTMCNC: 33.1 G/DL (ref 32–36)
MCV RBC AUTO: 97 FL (ref 82–98)
MODE: ABNORMAL
MONOCYTES # BLD AUTO: 0.5 K/UL (ref 0.3–1)
MONOCYTES NFR BLD: 12.2 % (ref 4–15)
NEUTROPHILS # BLD AUTO: 3.2 K/UL (ref 1.8–7.7)
NEUTROPHILS NFR BLD: 72.8 % (ref 38–73)
NRBC BLD-RTO: 0 /100 WBC
OHS QRS DURATION: 148 MS
OHS QTC CALCULATION: 549 MS
PCO2 BLDA: 52.6 MMHG (ref 35–45)
PH SMN: 7.44 [PH] (ref 7.35–7.45)
PLATELET # BLD AUTO: 153 K/UL (ref 150–450)
PMV BLD AUTO: 9.1 FL (ref 9.2–12.9)
PO2 BLDA: 31 MMHG (ref 40–60)
POC BE: 11 MMOL/L
POC SATURATED O2: 60 % (ref 95–100)
POTASSIUM SERPL-SCNC: 3.3 MMOL/L (ref 3.5–5.1)
POTASSIUM SERPL-SCNC: 3.9 MMOL/L (ref 3.5–5.1)
RBC # BLD AUTO: 3.85 M/UL (ref 4.6–6.2)
SAMPLE: ABNORMAL
SITE: ABNORMAL
SODIUM SERPL-SCNC: 137 MMOL/L (ref 136–145)
SODIUM SERPL-SCNC: 139 MMOL/L (ref 136–145)
WBC # BLD AUTO: 4.44 K/UL (ref 3.9–12.7)

## 2024-04-06 PROCEDURE — 85025 COMPLETE CBC W/AUTO DIFF WBC: CPT | Performed by: INTERNAL MEDICINE

## 2024-04-06 PROCEDURE — 63600175 PHARM REV CODE 636 W HCPCS: Performed by: NURSE PRACTITIONER

## 2024-04-06 PROCEDURE — 85730 THROMBOPLASTIN TIME PARTIAL: CPT | Performed by: SPECIALIST

## 2024-04-06 PROCEDURE — 02HV33Z INSERTION OF INFUSION DEVICE INTO SUPERIOR VENA CAVA, PERCUTANEOUS APPROACH: ICD-10-PCS | Performed by: INTERNAL MEDICINE

## 2024-04-06 PROCEDURE — 25000003 PHARM REV CODE 250: Performed by: PHYSICIAN ASSISTANT

## 2024-04-06 PROCEDURE — 93005 ELECTROCARDIOGRAM TRACING: CPT

## 2024-04-06 PROCEDURE — 25000003 PHARM REV CODE 250: Performed by: NURSE PRACTITIONER

## 2024-04-06 PROCEDURE — 27000221 HC OXYGEN, UP TO 24 HOURS

## 2024-04-06 PROCEDURE — 93010 ELECTROCARDIOGRAM REPORT: CPT | Mod: ,,, | Performed by: INTERNAL MEDICINE

## 2024-04-06 PROCEDURE — 63600175 PHARM REV CODE 636 W HCPCS: Performed by: INTERNAL MEDICINE

## 2024-04-06 PROCEDURE — 25000003 PHARM REV CODE 250: Performed by: INTERNAL MEDICINE

## 2024-04-06 PROCEDURE — 36415 COLL VENOUS BLD VENIPUNCTURE: CPT | Performed by: SPECIALIST

## 2024-04-06 PROCEDURE — 99291 CRITICAL CARE FIRST HOUR: CPT | Mod: 25,,, | Performed by: INTERNAL MEDICINE

## 2024-04-06 PROCEDURE — 99900035 HC TECH TIME PER 15 MIN (STAT)

## 2024-04-06 PROCEDURE — 83605 ASSAY OF LACTIC ACID: CPT | Performed by: INTERNAL MEDICINE

## 2024-04-06 PROCEDURE — 83735 ASSAY OF MAGNESIUM: CPT | Performed by: INTERNAL MEDICINE

## 2024-04-06 PROCEDURE — C1751 CATH, INF, PER/CENT/MIDLINE: HCPCS

## 2024-04-06 PROCEDURE — 82803 BLOOD GASES ANY COMBINATION: CPT

## 2024-04-06 PROCEDURE — 25000003 PHARM REV CODE 250: Performed by: SPECIALIST

## 2024-04-06 PROCEDURE — 36415 COLL VENOUS BLD VENIPUNCTURE: CPT | Performed by: INTERNAL MEDICINE

## 2024-04-06 PROCEDURE — 94761 N-INVAS EAR/PLS OXIMETRY MLT: CPT | Mod: XB

## 2024-04-06 PROCEDURE — 80048 BASIC METABOLIC PNL TOTAL CA: CPT | Mod: 91 | Performed by: INTERNAL MEDICINE

## 2024-04-06 PROCEDURE — 36573 INSJ PICC RS&I 5 YR+: CPT

## 2024-04-06 PROCEDURE — 20000000 HC ICU ROOM

## 2024-04-06 RX ORDER — METOPROLOL TARTRATE 25 MG/1
25 TABLET, FILM COATED ORAL 2 TIMES DAILY
Status: DISCONTINUED | OUTPATIENT
Start: 2024-04-06 | End: 2024-04-12 | Stop reason: HOSPADM

## 2024-04-06 RX ORDER — DIGOXIN 0.25 MG/ML
250 INJECTION INTRAMUSCULAR; INTRAVENOUS EVERY 6 HOURS
Status: DISCONTINUED | OUTPATIENT
Start: 2024-04-06 | End: 2024-04-06

## 2024-04-06 RX ORDER — LANOLIN ALCOHOL/MO/W.PET/CERES
800 CREAM (GRAM) TOPICAL
Status: DISCONTINUED | OUTPATIENT
Start: 2024-04-06 | End: 2024-04-10

## 2024-04-06 RX ORDER — DIGOXIN 0.25 MG/ML
250 INJECTION INTRAMUSCULAR; INTRAVENOUS EVERY 6 HOURS
Status: COMPLETED | OUTPATIENT
Start: 2024-04-06 | End: 2024-04-07

## 2024-04-06 RX ORDER — BENZONATATE 100 MG/1
100 CAPSULE ORAL 3 TIMES DAILY PRN
Status: DISCONTINUED | OUTPATIENT
Start: 2024-04-06 | End: 2024-04-10

## 2024-04-06 RX ORDER — SODIUM CHLORIDE 0.9 % (FLUSH) 0.9 %
10 SYRINGE (ML) INJECTION
Status: DISCONTINUED | OUTPATIENT
Start: 2024-04-06 | End: 2024-04-12 | Stop reason: HOSPADM

## 2024-04-06 RX ORDER — SODIUM CHLORIDE 0.9 % (FLUSH) 0.9 %
10 SYRINGE (ML) INJECTION EVERY 6 HOURS
Status: DISCONTINUED | OUTPATIENT
Start: 2024-04-06 | End: 2024-04-12 | Stop reason: HOSPADM

## 2024-04-06 RX ORDER — HYDROCODONE BITARTRATE AND ACETAMINOPHEN 5; 325 MG/1; MG/1
1 TABLET ORAL EVERY 8 HOURS PRN
Status: DISCONTINUED | OUTPATIENT
Start: 2024-04-06 | End: 2024-04-12 | Stop reason: HOSPADM

## 2024-04-06 RX ADMIN — Medication 800 MG: at 04:04

## 2024-04-06 RX ADMIN — POTASSIUM BICARBONATE 35 MEQ: 391 TABLET, EFFERVESCENT ORAL at 04:04

## 2024-04-06 RX ADMIN — AMIODARONE HYDROCHLORIDE 200 MG: 200 TABLET ORAL at 08:04

## 2024-04-06 RX ADMIN — ASPIRIN 81 MG: 81 TABLET, COATED ORAL at 08:04

## 2024-04-06 RX ADMIN — MORPHINE SULFATE 2 MG: 4 INJECTION INTRAVENOUS at 06:04

## 2024-04-06 RX ADMIN — BENZONATATE 100 MG: 100 CAPSULE ORAL at 08:04

## 2024-04-06 RX ADMIN — AMIODARONE HYDROCHLORIDE 1 MG/MIN: 1.8 INJECTION, SOLUTION INTRAVENOUS at 10:04

## 2024-04-06 RX ADMIN — HEPARIN SODIUM 9 UNITS/KG/HR: 10000 INJECTION, SOLUTION INTRAVENOUS at 06:04

## 2024-04-06 RX ADMIN — POTASSIUM BICARBONATE 35 MEQ: 391 TABLET, EFFERVESCENT ORAL at 06:04

## 2024-04-06 RX ADMIN — METOPROLOL SUCCINATE 25 MG: 25 TABLET, EXTENDED RELEASE ORAL at 08:04

## 2024-04-06 RX ADMIN — DIGOXIN 250 MCG: 250 INJECTION, SOLUTION INTRAMUSCULAR; INTRAVENOUS at 12:04

## 2024-04-06 RX ADMIN — Medication 800 MG: at 08:04

## 2024-04-06 RX ADMIN — CLOPIDOGREL BISULFATE 75 MG: 75 TABLET ORAL at 08:04

## 2024-04-06 RX ADMIN — FAMOTIDINE 20 MG: 20 TABLET ORAL at 08:04

## 2024-04-06 RX ADMIN — MUPIROCIN: 20 OINTMENT TOPICAL at 08:04

## 2024-04-06 RX ADMIN — AMIODARONE HYDROCHLORIDE 0.5 MG/MIN: 1.8 INJECTION, SOLUTION INTRAVENOUS at 03:04

## 2024-04-06 RX ADMIN — AMIODARONE HYDROCHLORIDE 150 MG: 1.5 INJECTION, SOLUTION INTRAVENOUS at 10:04

## 2024-04-06 RX ADMIN — FUROSEMIDE 40 MG: 10 INJECTION, SOLUTION INTRAMUSCULAR; INTRAVENOUS at 02:04

## 2024-04-06 RX ADMIN — DIGOXIN 250 MCG: 250 INJECTION, SOLUTION INTRAMUSCULAR; INTRAVENOUS at 11:04

## 2024-04-06 RX ADMIN — FUROSEMIDE 40 MG: 10 INJECTION, SOLUTION INTRAMUSCULAR; INTRAVENOUS at 05:04

## 2024-04-06 RX ADMIN — ATORVASTATIN CALCIUM 40 MG: 40 TABLET, FILM COATED ORAL at 08:04

## 2024-04-06 RX ADMIN — MORPHINE SULFATE 2 MG: 4 INJECTION INTRAVENOUS at 11:04

## 2024-04-06 RX ADMIN — MORPHINE SULFATE 2 MG: 4 INJECTION INTRAVENOUS at 09:04

## 2024-04-06 RX ADMIN — ISOSORBIDE MONONITRATE 30 MG: 30 TABLET, EXTENDED RELEASE ORAL at 08:04

## 2024-04-06 RX ADMIN — ALPRAZOLAM 0.25 MG: 0.25 TABLET ORAL at 08:04

## 2024-04-06 RX ADMIN — METOPROLOL TARTRATE 25 MG: 25 TABLET, FILM COATED ORAL at 08:04

## 2024-04-06 RX ADMIN — DIGOXIN 250 MCG: 250 INJECTION, SOLUTION INTRAMUSCULAR; INTRAVENOUS at 05:04

## 2024-04-06 NOTE — PROGRESS NOTES
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  Of note, patient had a first-time visit with Cardiology (Dr. Corrales) on 04/1 and was initiated on Eliquis for a flutter and was stated on metoprolol. In the ED, vitals: 130/69, 121, 22, 97.8F, 95% RA. Significant Labs: BNP: 560, Trop: 0.064, Bili: 1.1. CXR: interstitial edema. EKG: Neg for STEMI. Cardiology consulted in ED. Treated with ASA, Nitro, BB, diuretic. Initiated on Heparin Infusion. Placed was observation under the care of Hospital Medicine for management of elevated troponin, ACS rule out.  During hospital stay patient underwent diuresis and is-6.5 L as of 4/5.  On 04/04 patient was taken to the cath lab and underwent RODERICK with DCCV as well as a left-to-right heart catheterization which revealed severe three-vessel disease and a consultation by CT surgery was recommended; per documentation it appears discussions were had an incision was made to take the patient back to the cath lab on 04/05 were underwent another C with PCI to the mid left circ x1 in the mid LAD x1.  In the PACU patient had an episode of flash pulmonary edema for which he required NIPPV as well as diuretics.  Balloon pump was also placed prior to transfer to the ICU. It also appears that life vest is being arranged for discharge.     Hospital/ICU Course:  4/6: no acute events overnight, pt awake and  alert, on 2L NC, denies CP or SOB or any other issues, just wants to be able to move his right leg    ROS complete and negative unless stated in the interval HPI     Objective:     Vital Signs (Most Recent):  Temp: 98.4 °F (36.9 °C) (04/06/24 0705)  Pulse: 73 (04/06/24 0705)  Resp: 19 (04/06/24 0705)  BP: (!) 97/55 (04/06/24 0705)  SpO2: 95 % (04/06/24 0705) Vital Signs (24h Range):  Temp:  [97.5 °F (36.4 °C)-98.4 °F (36.9 °C)] 98.4 °F (36.9 °C)  Pulse:  [] 73  Resp:  [17-42] 19  SpO2:  [88 %-97 %] 95 %  BP: ()/() 97/55     Weight: 129.7 kg (286 lb)  Body mass index is 39.89 kg/m².      Intake/Output Summary (Last 24 hours) at 4/6/2024 0835  Last data filed at 4/6/2024 0800  Gross per 24 hour   Intake 1273.45 ml   Output 5955 ml   Net -4681.55 ml        Physical Exam  Vitals reviewed.   Constitutional:       General: He is awake. He is not in acute distress.     Appearance: He is well-developed. He is not ill-appearing.   Cardiovascular:      Rate and Rhythm: Normal rate.      Pulses:           Radial pulses are 2+ on the right side and 2+ on the left side.      Comments: RLE immobilizer in place s/t IABP  Pulmonary:      Effort: Pulmonary effort is normal.      Breath sounds: Normal breath sounds.      Comments: On 2L NC  Abdominal:      General: There is no distension.      Palpations: Abdomen is soft.   Musculoskeletal:      Right lower leg: 3+ Edema present.      Left lower leg: 3+ Edema present.      Comments: RLE immobilizer in place s/t IABP   Skin:     General: Skin is warm and dry.   Neurological:      General: No focal deficit present.      Mental Status: He is alert.   Psychiatric:         Behavior: Behavior is cooperative.               Vents:  Oxygen Concentration (%): 50 (04/05/24 1542)    Lines/Drains/Airways       Drain  Duration                  Urethral Catheter 04/05/24 1600 <1 day              Line  Duration                  IABP 04/05/24 1546 <1 day              Peripheral  Intravenous Line  Duration                  Peripheral IV - Single Lumen 04/02/24 0813 20 G Posterior;Right Forearm 4 days         Peripheral IV - Single Lumen 04/03/24 1700 22 G Posterior;Right Hand 2 days         Peripheral IV - Single Lumen 04/05/24 1609 20 G Anterior;Left Forearm <1 day                    Significant Labs:    CBC/Anemia Profile:  Recent Labs   Lab 04/05/24  0537 04/05/24  1716 04/06/24  0310   WBC 4.16 4.43 4.44   HGB 13.7* 13.7* 12.3*   HCT 41.0 41.3 37.2*    170 153   MCV 97 98 97   RDW 13.6 13.7 13.6        Chemistries:  Recent Labs   Lab 04/04/24  1744 04/05/24  0537 04/06/24  0310    136 139   K 3.8 3.6 3.3*    100 99   CO2 26 27 30*   BUN 23 25* 21   CREATININE 1.2 1.3 1.1   CALCIUM 8.7 9.0 9.1   MG  --  1.8 1.8   PHOS  --  4.0  --        All pertinent labs within the past 24 hours have been reviewed.    Significant Imaging:  I have reviewed all pertinent imaging results/findings within the past 24 hours.    ABG  Recent Labs   Lab 04/04/24  1246   PH 7.418   PO2 34*   PCO2 50.3   HCO3 32.5*   BE 8*     Assessment/Plan:     Cardiac/Vascular  * Acute on chronic congestive heart failure  - history of combined HF (EF 30-35% with diastolic heart failure, severe mitral regurg and a pulmonary artery pressure 41)  - underwent successful diuresis this admit and is currently 6.5L negative for stay  - continue lasix and spironolactone  - after heart catheterization on 04/05 patient had an episode of flash pulmonary edema with required BiPAP and diuresis, now on 2L NC, continue to wean as able    Atrial flutter  - s/p RODERICK and DCCV 4/4  - on amio and BB  - on heparin gtt  - tele    Chest pain  - resolved    Elevated troponin  - see plan for CAD    Venous stasis dermatitis of both lower extremities  - diuresis, elevate extremities, supportive care as outlined elsewhere     Essential hypertension  - mgmt per cards  - currently on BB, lasix, and spironolactone   - trend hemodynamics and  adjust meds as needed, trend stable     Coronary artery disease involving native coronary artery of native heart  - s/p RODERICK with DCCV and R/LHC 4/4 that revealed severe 3 vessel disease, discussions were had with CT surgery and Life Vest arranged  - s/p repeat LHC 4/5 with PCI to mid left circ x1 and mid LAD x1, placement of balloon pump  - plavix, ASA, statin, BB  - mgmt per cards    Endocrine  BMI 39.0-39.9,adult  - affecting medical decision making and complicating the above   - pt would benefit greatly from weight loss and lifestyle modifications      Prophylaxis Measures:  GI ppx: Famotidine  VTE ppx: Heparin  Glucose control: Monitor blood glucose    Code Status: Full Code       Raghu Nicole NP  Critical Care Medicine  [facility] ([facility])

## 2024-04-06 NOTE — ASSESSMENT & PLAN NOTE
-Currently in aflutter HR low 100's  -Contributing to volume status/cardiomyopathy  -Continue BB  -Start amiodarone drip  -Continue heparin drip  -Will need RODERICK/DCCV post ischemic workup as well as EP evaluation to discuss ablation in future    4.4.2024  RODERICK cardioversion today after heart catheterization.    4/5/24  -Remains in SR s/p RODERICK/DCCV  -Amiodarone switched to po  -Continue BB  -Heparin gtt for now, will need Eliquis upon d/c    4.6.2024  BACK IN A FLUTTER THIS MORNING.  We will resume IV amiodarone.  Continue with p.o..    Add digoxin IV.    Continue with beta-blockers.    Continue with balloon pump for now.    He may need a repeat cardioversion if does not convert back to normal with medications.    Future plan for ablation as well.

## 2024-04-06 NOTE — PROGRESS NOTES
[facility] ([facility])  Cardiology  Progress Note    Patient Name: Chucho Clark  MRN: [id number]  Admission Date: 4/2/2024  Hospital Length of Stay: 3 days  Code Status: Full Code   Attending Physician: Raffi Gonzalez MD   Primary Care Physician: Ale, Primary Doctor  Expected Discharge Date:   Principal Problem:Acute on chronic congestive heart failure    Subjective:     Hospital Course:   4/3/24-Patient seen and examined today, resting in bed. Feeling better. Less SOB, diuresed well overnight. Still has significant BLE edema. Labs reviewed/stable. EKG with aflutter. TTE with EF of 20-25%, decreased RV function, severe MR. Troponin flat. Discussed ischemic workup possibly tmw pending clinical condition.     4.4.2024  Still in aflutter, swelling significantly improved   In bed, laying comfortably    4/5/24-Patient seen and examined today, s/p R/LHC yesterday which showed severe triple vessel disease. Underwent RODERICK/DCCV with restoration of SR. Feels ok. Does have some increase in SOB/orthopnea. No CP symptoms. Remains in SR. Reviewed case with patient/CTS, will proceed with repeat LHC today and PCI. LifeVest ordered for SCD prevention.        Review of Systems   Cardiovascular:  Positive for leg swelling.     Objective:     Vital Signs (Most Recent):  Temp: 98.7 °F (37.1 °C) (04/06/24 1105)  Pulse: 103 (04/06/24 1205)  Resp: (!) 28 (04/06/24 1205)  BP: 108/71 (04/06/24 1205)  SpO2: 95 % (04/06/24 1205) Vital Signs (24h Range):  Temp:  [97.5 °F (36.4 °C)-98.7 °F (37.1 °C)] 98.7 °F (37.1 °C)  Pulse:  [] 103  Resp:  [17-42] 28  SpO2:  [88 %-97 %] 95 %  BP: ()/() 108/71     Weight: 129.7 kg (286 lb)  Body mass index is 39.89 kg/m².     SpO2: 95 %         Intake/Output Summary (Last 24 hours) at 4/6/2024 1214  Last data filed at 4/6/2024 1200  Gross per 24 hour   Intake 1459.79 ml   Output 6250 ml   Net -4790.21 ml       Lines/Drains/Airways       Drain  Duration                   Urethral Catheter 04/05/24 1600 <1 day              Line  Duration                  IABP 04/05/24 1546 <1 day              Peripheral Intravenous Line  Duration                  Peripheral IV - Single Lumen 04/02/24 0813 20 G Posterior;Right Forearm 4 days         Peripheral IV - Single Lumen 04/03/24 1700 22 G Posterior;Right Hand 2 days         Peripheral IV - Single Lumen 04/05/24 1609 20 G Anterior;Left Forearm <1 day                       Physical Exam  Vitals reviewed.   Constitutional:       Appearance: He is well-developed.   Neck:      Vascular: No carotid bruit.   Cardiovascular:      Rate and Rhythm: Tachycardia present. Rhythm irregular.      Pulses: Intact distal pulses.      Heart sounds: Murmur heard.   Pulmonary:      Breath sounds: Normal breath sounds.   Musculoskeletal:         General: Swelling present.   Neurological:      Mental Status: He is oriented to person, place, and time.     Lying in bed.  Balloon pump in place.    Site dry, no hematoma or bleeding.    Warm lower extremities.             Significant Labs: All pertinent lab results from the last 24 hours have been reviewed. and   Recent Lab Results  (Last 5 results in the past 24 hours)        04/06/24  0821   04/06/24  0310   04/05/24  2334   04/05/24  1824   04/05/24  1716        Anion Gap   10             PTT 49.5  Comment: Refer to local heparin nomogram for intensity/dose specific   therapeutic   range.       89.1  Comment: Refer to local heparin nomogram for intensity/dose specific   therapeutic   range.  Results confirmed, test repeated             Baso #   0.01       0.02       Basophil %   0.2       0.5       BUN   21             Calcium   9.1             Chloride   99             CO2   30             Creatinine   1.1             Differential Method   Automated       Automated       eGFR   >60             Eos #   0.0       0.1       Eos %   0.9       1.1       Glucose   87             Gran # (ANC)   3.2       3.4       Gran %    72.8       77.2       Hematocrit   37.2       41.3       Hemoglobin   12.3       13.7       Immature Grans (Abs)   0.01  Comment: Mild elevation in immature granulocytes is non specific and   can be seen in a variety of conditions including stress response,   acute inflammation, trauma and pregnancy. Correlation with other   laboratory and clinical findings is essential.         0.01  Comment: Mild elevation in immature granulocytes is non specific and   can be seen in a variety of conditions including stress response,   acute inflammation, trauma and pregnancy. Correlation with other   laboratory and clinical findings is essential.         Immature Granulocytes   0.2       0.2       Lactic Acid Level   0.8  Comment: Falsely low lactic acid results can be found in samples   containing >=13.0 mg/dL total bilirubin and/or >=3.5 mg/dL   direct bilirubin.       1.1  Comment: Falsely low lactic acid results can be found in samples   containing >=13.0 mg/dL total bilirubin and/or >=3.5 mg/dL   direct bilirubin.           Lymph #   0.6       0.6       Lymph %   13.7       12.4       Magnesium    1.8             MCH   31.9       32.4       MCHC   33.1       33.2       MCV   97       98       Mono #   0.5       0.4       Mono %   12.2       8.6       MPV   9.1       10.6       nRBC   0       0       Platelet Count   153       170       Potassium   3.3             RBC   3.85       4.23       RDW   13.6       13.7       Sodium   139             WBC   4.44       4.43                                Assessment and Plan:     Brief HPI:     * Acute on chronic congestive heart failure  Patient is identified as having Diastolic (HFpEF) heart failure that is Acute. CHF is currently uncontrolled due to Pulmonary edema/pleural effusion on CXR. Latest ECHO performed and demonstrates- No results found for this or any previous visit.  . Continue Furosemide and monitor clinical status closely. Monitor on telemetry. Patient is on CHF  pathway.  Monitor strict Is&Os and daily weights.  Place on fluid restriction of 2 L. Cardiology has been consulted. Continue to stress to patient importance of self efficacy and  on diet for CHF. Last BNP reviewed- and noted below   Recent Labs   Lab 04/02/24  0817   *     Cw iv lasix     4/3/24  -TTE with EF of 20-25%, moderately reduced RV function, pulmonary HTN, severe MR  -Continue IV diuresis  -Continue BB  -Will add ARB vs Entresto pending creatinine/BP trend  -Strict I's/O's  -Probable R/LHC tmw pending reassessment    4.4.2024  Right and left heart catheterization today.    Lasix held due to drop in pressure yesterday evening.    Discussed risks and benefits with the family, son and daughter at bedside.    4/5/24  -More SOB this AM with cough/orthopnea  -IV Lasix x one dose  -Continue Aldactone, BB    4.6.2024  Continue with balloon pump for today.    We will get a central line.  Discussed with ICU staff.  Check CVP and mixed venous.    Lactic acid normal.    Continue with IV diuresis.        On intra-aortic balloon pump assist  Continue today.    Placed yesterday after flash pulmonary edema in recovery post PCI.        Left bundle branch block  We will have him evaluated by EP for possible CRT.        Atrial flutter  -Currently in aflutter HR low 100's  -Contributing to volume status/cardiomyopathy  -Continue BB  -Start amiodarone drip  -Continue heparin drip  -Will need RODERICK/DCCV post ischemic workup as well as EP evaluation to discuss ablation in future    4.4.2024  RODERICK cardioversion today after heart catheterization.    4/5/24  -Remains in SR s/p RODERICK/DCCV  -Amiodarone switched to po  -Continue BB  -Heparin gtt for now, will need Eliquis upon d/c    4.6.2024  BACK IN A FLUTTER THIS MORNING.  We will resume IV amiodarone.  Continue with p.o..    Add digoxin IV.    Continue with beta-blockers.    Continue with balloon pump for now.    He may need a repeat cardioversion if does not convert  back to normal with medications.    Future plan for ablation as well.        Orthopnea  -IV Lasix x one dose    Chest pain  Heparin iv   Trend trop   Echo   Aspirin   Will need ischemic work up prior to discharge  Cw toprol    4/3/24  -Stable, troponin flat  -Continue ASA, BB, statin, heparin gtt  -Probable LHC tmw pending reassessment of volume status    4/5/24  -Multivessel CAD on cath    Elevated troponin  -Flat  -Likely secondary to demand ischemia from new onset atrial flutter and combined CHF  -Continue ASA, BB, heparin gtt, statin  -Ischemic workup with [place] once better compensated    4/5/24  -s/p LHC which showed triple vessel disease  -Case discussed with CTS and patient/family, repeat LHC today with PCI. Dr. Hernandez explained all risks, benefits, and treatment alternatives. All questions answered. Patient agreeable with proceeding.     Essential hypertension  -Continue BB  -Monitor BP trend    Coronary artery disease involving native coronary artery of native heart  04/06/2024.    Status post PCI to LAD and circumflex yesterday.    He will eventually need at this point PCI of his RCA  to help improve his mitral valve function        VTE Risk Mitigation (From admission, onward)           Ordered     heparin 25,000 units in dextrose 5% (100 units/ml) IV bolus from bag LOW INTENSITY nomogram - [facility]  As needed (PRN)        Question:  Heparin Infusion Adjustment (DO NOT MODIFY ANSWER)  Answer:  [email or web address]\Images\Pharmacy\HeparinInfusions\heparin LOW INTENSITY nomogram for [facility] [id number]    04/05/24 1558     heparin 25,000 units in dextrose 5% (100 units/ml) IV bolus from bag LOW INTENSITY nomogram - [facility]  As needed (PRN)        Question:  Heparin Infusion Adjustment (DO NOT MODIFY ANSWER)  Answer:  [email or web address]\Images\Pharmacy\HeparinInfusions\heparin LOW INTENSITY nomogram for [facility] [id number]    04/05/24 1558     heparin 25,000 units in dextrose 5% (100 units/ml) IV bolus from bag LOW INTENSITY  nomogram - [facility]  Once        Question:  Heparin Infusion Adjustment (DO NOT MODIFY ANSWER)  Answer:  [email or web address]\Images\Pharmacy\HeparinInfusions\heparin LOW INTENSITY nomogram for [facility] [id number]    04/05/24 1558     heparin 25,000 units in dextrose 5% 250 mL (100 units/mL) infusion LOW INTENSITY nomogram - [facility]  Continuous        Question:  Begin at (units/kg/hr)  Answer:  12    04/05/24 1558     Reason for No Pharmacological VTE Prophylaxis  Once        Comments: Heparin Infusion   Question:  Reasons:  Answer:  Physician Provided (leave comment)    04/02/24 1153     IP VTE HIGH RISK PATIENT  Once         04/02/24 1153     Place sequential compression device  Until discontinued         04/02/24 1153                    Suzanna Hernandez MD  Cardiology  [facility] ([facility])

## 2024-04-06 NOTE — SUBJECTIVE & OBJECTIVE
Review of Systems   Cardiovascular:  Positive for leg swelling.     Objective:     Vital Signs (Most Recent):  Temp: 98.7 °F (37.1 °C) (04/06/24 1105)  Pulse: 103 (04/06/24 1205)  Resp: (!) 28 (04/06/24 1205)  BP: 108/71 (04/06/24 1205)  SpO2: 95 % (04/06/24 1205) Vital Signs (24h Range):  Temp:  [97.5 °F (36.4 °C)-98.7 °F (37.1 °C)] 98.7 °F (37.1 °C)  Pulse:  [] 103  Resp:  [17-42] 28  SpO2:  [88 %-97 %] 95 %  BP: ()/() 108/71     Weight: 129.7 kg (286 lb)  Body mass index is 39.89 kg/m².     SpO2: 95 %         Intake/Output Summary (Last 24 hours) at 4/6/2024 1214  Last data filed at 4/6/2024 1200  Gross per 24 hour   Intake 1459.79 ml   Output 6250 ml   Net -4790.21 ml       Lines/Drains/Airways       Drain  Duration                  Urethral Catheter 04/05/24 1600 <1 day              Line  Duration                  IABP 04/05/24 1546 <1 day              Peripheral Intravenous Line  Duration                  Peripheral IV - Single Lumen 04/02/24 0813 20 G Posterior;Right Forearm 4 days         Peripheral IV - Single Lumen 04/03/24 1700 22 G Posterior;Right Hand 2 days         Peripheral IV - Single Lumen 04/05/24 1609 20 G Anterior;Left Forearm <1 day                       Physical Exam  Vitals reviewed.   Constitutional:       Appearance: He is well-developed.   Neck:      Vascular: No carotid bruit.   Cardiovascular:      Rate and Rhythm: Tachycardia present. Rhythm irregular.      Pulses: Intact distal pulses.      Heart sounds: Murmur heard.   Pulmonary:      Breath sounds: Normal breath sounds.   Musculoskeletal:         General: Swelling present.   Neurological:      Mental Status: He is oriented to person, place, and time.     Lying in bed.  Balloon pump in place.    Site dry, no hematoma or bleeding.    Warm lower extremities.             Significant Labs: All pertinent lab results from the last 24 hours have been reviewed. and   Recent Lab Results  (Last 5 results in the past 24  hours)        04/06/24  0821   04/06/24  0310   04/05/24  2334   04/05/24  1824   04/05/24  1716        Anion Gap   10             PTT 49.5  Comment: Refer to local heparin nomogram for intensity/dose specific   therapeutic   range.       89.1  Comment: Refer to local heparin nomogram for intensity/dose specific   therapeutic   range.  Results confirmed, test repeated             Baso #   0.01       0.02       Basophil %   0.2       0.5       BUN   21             Calcium   9.1             Chloride   99             CO2   30             Creatinine   1.1             Differential Method   Automated       Automated       eGFR   >60             Eos #   0.0       0.1       Eos %   0.9       1.1       Glucose   87             Gran # (ANC)   3.2       3.4       Gran %   72.8       77.2       Hematocrit   37.2       41.3       Hemoglobin   12.3       13.7       Immature Grans (Abs)   0.01  Comment: Mild elevation in immature granulocytes is non specific and   can be seen in a variety of conditions including stress response,   acute inflammation, trauma and pregnancy. Correlation with other   laboratory and clinical findings is essential.         0.01  Comment: Mild elevation in immature granulocytes is non specific and   can be seen in a variety of conditions including stress response,   acute inflammation, trauma and pregnancy. Correlation with other   laboratory and clinical findings is essential.         Immature Granulocytes   0.2       0.2       Lactic Acid Level   0.8  Comment: Falsely low lactic acid results can be found in samples   containing >=13.0 mg/dL total bilirubin and/or >=3.5 mg/dL   direct bilirubin.       1.1  Comment: Falsely low lactic acid results can be found in samples   containing >=13.0 mg/dL total bilirubin and/or >=3.5 mg/dL   direct bilirubin.           Lymph #   0.6       0.6       Lymph %   13.7       12.4       Magnesium    1.8             MCH   31.9       32.4       MCHC   33.1       33.2        MCV   97       98       Mono #   0.5       0.4       Mono %   12.2       8.6       MPV   9.1       10.6       nRBC   0       0       Platelet Count   153       170       Potassium   3.3             RBC   3.85       4.23       RDW   13.6       13.7       Sodium   139             WBC   4.44       4.43

## 2024-04-06 NOTE — ASSESSMENT & PLAN NOTE
- mgmt per cards  - currently on BB, lasix, and spironolactone   - trend hemodynamics and adjust meds as needed, trend stable

## 2024-04-06 NOTE — PLAN OF CARE
Pt AAOx4. Aflutter on monitor. BP stable. Afebrile. 2L O2 NC. Tolerating cardiac diet. NPO after midnight for possible cardioversion. Balloon pump insertion site CDI, pulses +2. Immobilizer in place. Ann intact, good UOP. Pt weight shifted with pillows as tolerated. Waffle mattress in place. PIVs & PICC intact. Bed low, wheels locked, alarms audible. POC reviewed with pt and family at bedside.

## 2024-04-06 NOTE — HOSPITAL COURSE
4/6: No acute events overnight, pt awake and alert, on 2L NC, denies CP or SOB or any other issues, just wants to be able to move his right leg  4/7: No acute events, a fib on monitor with controlled rate, remains with IABP in place, on NC in NAD  4/8: Denies any chst pain/SOB. Complains of irritability to R thigh and difficulty getting in comfortable position. Plan for cath lab again today with Dr. Mayers.   4/9: Went to cath lab yesterday and stent x3. IABP pulled this am. Patient feels a lot more comfortable. Denies any chest pain/shortness of breath.   4/10: NAEON. Doing well. Sitting up in bedside chair. Denies any chest pain/shortness of breath.

## 2024-04-06 NOTE — ASSESSMENT & PLAN NOTE
Patient is identified as having Diastolic (HFpEF) heart failure that is Acute. CHF is currently uncontrolled due to Pulmonary edema/pleural effusion on CXR. Latest ECHO performed and demonstrates- No results found for this or any previous visit.  . Continue Furosemide and monitor clinical status closely. Monitor on telemetry. Patient is on CHF pathway.  Monitor strict Is&Os and daily weights.  Place on fluid restriction of 2 L. Cardiology has been consulted. Continue to stress to patient importance of self efficacy and  on diet for CHF. Last BNP reviewed- and noted below   Recent Labs   Lab 04/02/24  0817   *     Cw iv lasix     4/3/24  -TTE with EF of 20-25%, moderately reduced RV function, pulmonary HTN, severe MR  -Continue IV diuresis  -Continue BB  -Will add ARB vs Entresto pending creatinine/BP trend  -Strict I's/O's  -Probable R/LHC tmw pending reassessment    4.4.2024  Right and left heart catheterization today.    Lasix held due to drop in pressure yesterday evening.    Discussed risks and benefits with the family, son and daughter at bedside.    4/5/24  -More SOB this AM with cough/orthopnea  -IV Lasix x one dose  -Continue Aldactone, BB    4.6.2024  Continue with balloon pump for today.    We will get a central line.  Discussed with ICU staff.  Check CVP and mixed venous.    Lactic acid normal.    Continue with IV diuresis.

## 2024-04-06 NOTE — ASSESSMENT & PLAN NOTE
- history of combined HF (EF 30-35% with diastolic heart failure, severe mitral regurg and a pulmonary artery pressure 41)  - underwent successful diuresis this admit and is currently 6.5L negative for stay  - continue lasix and spironolactone  - after heart catheterization on 04/05 patient had an episode of flash pulmonary edema with required BiPAP and diuresis, now on 2L NC, continue to wean as able

## 2024-04-06 NOTE — PLAN OF CARE
PT AAOx4.  NSR on monitor, R fem IABP in place with no issues, Normotensive and afebrile.  EKG done this morning: NSR with LBBB.  Ann in place, responding well to diuretics. Total UOP for PM shift 2400ml.  Bed in reverse trendelenburg, right leg with immobilizer.  PRN electrolyte replacement given per orders.    Call light in reach, all alarms audible and appropriate.

## 2024-04-06 NOTE — PROCEDURES
"Chucho Clark is a 78 y.o. male patient.    Temp: 98.7 °F (37.1 °C) (04/06/24 1105)  Pulse: 102 (04/06/24 1405)  Resp: (!) 24 (04/06/24 1405)  BP: 106/64 (04/06/24 1405)  SpO2: 95 % (04/06/24 1405)  Weight: 129.7 kg (286 lb) (04/05/24 0742)  Height: 5' 11" (180.3 cm) (04/05/24 0742)    PICC  Date/Time: 4/6/2024 1:25 PM  Performed by: Efrain Lan RN  Consent Done: Yes  Time out: Immediately prior to procedure a time out was called to verify the correct patient, procedure, equipment, support staff and site/side marked as required  Indications: hemodynamic monitoring  Anesthesia: local infiltration  Local anesthetic: lidocaine 1% without epinephrine  Anesthetic Total (mL): 2  Preparation: skin prepped with chlorhexidine (without alcohol)  Skin prep agent dried: skin prep agent completely dried prior to procedure  Sterile barriers: all five maximum sterile barriers used - cap, mask, sterile gown, sterile gloves, and large sterile sheet  Hand hygiene: hand hygiene performed prior to central venous catheter insertion  Location details: right basilic  Catheter type: double lumen  Catheter size: 4 Fr  Catheter Length: 39cm    Ultrasound guidance: yes  Vessel Caliber: large and patent, compressibility normal  Needle advanced into vessel with real time Ultrasound guidance.  Guidewire confirmed in vessel.  Sterile sheath used.  Number of attempts: 1  Post-procedure: blood return through all ports, chlorhexidine patch and sterile dressing applied  Specimens: No  Implants: No  Assessment: placement verified by x-ray, free fluid flow, no pneumothorax on x-ray and successful placement          Efrain Lan RN  4/6/2024    "

## 2024-04-06 NOTE — ASSESSMENT & PLAN NOTE
04/06/2024.    Status post PCI to LAD and circumflex yesterday.    He will eventually need at this point PCI of his RCA  to help improve his mitral valve function

## 2024-04-07 LAB
ALLENS TEST: ABNORMAL
ALLENS TEST: ABNORMAL
ANION GAP SERPL CALC-SCNC: 7 MMOL/L (ref 8–16)
ANION GAP SERPL CALC-SCNC: 9 MMOL/L (ref 8–16)
APTT PPP: 37 SEC (ref 21–32)
APTT PPP: 48.7 SEC (ref 21–32)
APTT PPP: 58.6 SEC (ref 21–32)
BASOPHILS # BLD AUTO: 0.01 K/UL (ref 0–0.2)
BASOPHILS # BLD AUTO: 0.01 K/UL (ref 0–0.2)
BASOPHILS NFR BLD: 0.2 % (ref 0–1.9)
BASOPHILS NFR BLD: 0.2 % (ref 0–1.9)
BUN SERPL-MCNC: 21 MG/DL (ref 8–23)
BUN SERPL-MCNC: 21 MG/DL (ref 8–23)
CALCIUM SERPL-MCNC: 8.5 MG/DL (ref 8.7–10.5)
CALCIUM SERPL-MCNC: 8.8 MG/DL (ref 8.7–10.5)
CHLORIDE SERPL-SCNC: 95 MMOL/L (ref 95–110)
CHLORIDE SERPL-SCNC: 97 MMOL/L (ref 95–110)
CO2 SERPL-SCNC: 29 MMOL/L (ref 23–29)
CO2 SERPL-SCNC: 31 MMOL/L (ref 23–29)
CREAT SERPL-MCNC: 1 MG/DL (ref 0.5–1.4)
CREAT SERPL-MCNC: 1.1 MG/DL (ref 0.5–1.4)
DELSYS: ABNORMAL
DELSYS: ABNORMAL
DIFFERENTIAL METHOD BLD: ABNORMAL
DIFFERENTIAL METHOD BLD: ABNORMAL
EOSINOPHIL # BLD AUTO: 0.1 K/UL (ref 0–0.5)
EOSINOPHIL # BLD AUTO: 0.1 K/UL (ref 0–0.5)
EOSINOPHIL NFR BLD: 1.3 % (ref 0–8)
EOSINOPHIL NFR BLD: 1.3 % (ref 0–8)
ERYTHROCYTE [DISTWIDTH] IN BLOOD BY AUTOMATED COUNT: 13.2 % (ref 11.5–14.5)
ERYTHROCYTE [DISTWIDTH] IN BLOOD BY AUTOMATED COUNT: 13.2 % (ref 11.5–14.5)
EST. GFR  (NO RACE VARIABLE): >60 ML/MIN/1.73 M^2
EST. GFR  (NO RACE VARIABLE): >60 ML/MIN/1.73 M^2
FIO2: 40
FLOW: 2
FLOW: 5
GLUCOSE SERPL-MCNC: 102 MG/DL (ref 70–110)
GLUCOSE SERPL-MCNC: 96 MG/DL (ref 70–110)
HCO3 UR-SCNC: 34.1 MMOL/L (ref 24–28)
HCO3 UR-SCNC: 37 MMOL/L (ref 24–28)
HCT VFR BLD AUTO: 39 % (ref 40–54)
HCT VFR BLD AUTO: 39 % (ref 40–54)
HGB BLD-MCNC: 12.8 G/DL (ref 14–18)
HGB BLD-MCNC: 12.8 G/DL (ref 14–18)
IMM GRANULOCYTES # BLD AUTO: 0.01 K/UL (ref 0–0.04)
IMM GRANULOCYTES # BLD AUTO: 0.01 K/UL (ref 0–0.04)
IMM GRANULOCYTES NFR BLD AUTO: 0.2 % (ref 0–0.5)
IMM GRANULOCYTES NFR BLD AUTO: 0.2 % (ref 0–0.5)
LYMPHOCYTES # BLD AUTO: 0.7 K/UL (ref 1–4.8)
LYMPHOCYTES # BLD AUTO: 0.7 K/UL (ref 1–4.8)
LYMPHOCYTES NFR BLD: 15.1 % (ref 18–48)
LYMPHOCYTES NFR BLD: 15.1 % (ref 18–48)
MAGNESIUM SERPL-MCNC: 1.9 MG/DL (ref 1.6–2.6)
MCH RBC QN AUTO: 32 PG (ref 27–31)
MCH RBC QN AUTO: 32 PG (ref 27–31)
MCHC RBC AUTO-ENTMCNC: 32.8 G/DL (ref 32–36)
MCHC RBC AUTO-ENTMCNC: 32.8 G/DL (ref 32–36)
MCV RBC AUTO: 98 FL (ref 82–98)
MCV RBC AUTO: 98 FL (ref 82–98)
MODE: ABNORMAL
MODE: ABNORMAL
MONOCYTES # BLD AUTO: 0.5 K/UL (ref 0.3–1)
MONOCYTES # BLD AUTO: 0.5 K/UL (ref 0.3–1)
MONOCYTES NFR BLD: 10.1 % (ref 4–15)
MONOCYTES NFR BLD: 10.1 % (ref 4–15)
NEUTROPHILS # BLD AUTO: 3.3 K/UL (ref 1.8–7.7)
NEUTROPHILS # BLD AUTO: 3.3 K/UL (ref 1.8–7.7)
NEUTROPHILS NFR BLD: 73.1 % (ref 38–73)
NEUTROPHILS NFR BLD: 73.1 % (ref 38–73)
NRBC BLD-RTO: 0 /100 WBC
NRBC BLD-RTO: 0 /100 WBC
PCO2 BLDA: 52.1 MMHG (ref 35–45)
PCO2 BLDA: 63.2 MMHG (ref 35–45)
PH SMN: 7.38 [PH] (ref 7.35–7.45)
PH SMN: 7.42 [PH] (ref 7.35–7.45)
PLATELET # BLD AUTO: 155 K/UL (ref 150–450)
PLATELET # BLD AUTO: 155 K/UL (ref 150–450)
PMV BLD AUTO: 9.8 FL (ref 9.2–12.9)
PMV BLD AUTO: 9.8 FL (ref 9.2–12.9)
PO2 BLDA: 32 MMHG (ref 40–60)
PO2 BLDA: 33 MMHG (ref 40–60)
POC BE: 10 MMOL/L
POC BE: 12 MMOL/L
POC SATURATED O2: 59 % (ref 95–100)
POC SATURATED O2: 61 % (ref 95–100)
POTASSIUM SERPL-SCNC: 3.8 MMOL/L (ref 3.5–5.1)
POTASSIUM SERPL-SCNC: 3.8 MMOL/L (ref 3.5–5.1)
RBC # BLD AUTO: 4 M/UL (ref 4.6–6.2)
RBC # BLD AUTO: 4 M/UL (ref 4.6–6.2)
SAMPLE: ABNORMAL
SAMPLE: ABNORMAL
SITE: ABNORMAL
SITE: ABNORMAL
SODIUM SERPL-SCNC: 133 MMOL/L (ref 136–145)
SODIUM SERPL-SCNC: 135 MMOL/L (ref 136–145)
WBC # BLD AUTO: 4.57 K/UL (ref 3.9–12.7)
WBC # BLD AUTO: 4.57 K/UL (ref 3.9–12.7)

## 2024-04-07 PROCEDURE — 25000003 PHARM REV CODE 250: Performed by: NURSE PRACTITIONER

## 2024-04-07 PROCEDURE — 27000221 HC OXYGEN, UP TO 24 HOURS

## 2024-04-07 PROCEDURE — 83735 ASSAY OF MAGNESIUM: CPT | Performed by: INTERNAL MEDICINE

## 2024-04-07 PROCEDURE — 80048 BASIC METABOLIC PNL TOTAL CA: CPT | Performed by: INTERNAL MEDICINE

## 2024-04-07 PROCEDURE — 85025 COMPLETE CBC W/AUTO DIFF WBC: CPT | Performed by: INTERNAL MEDICINE

## 2024-04-07 PROCEDURE — 82803 BLOOD GASES ANY COMBINATION: CPT

## 2024-04-07 PROCEDURE — 25000003 PHARM REV CODE 250: Performed by: INTERNAL MEDICINE

## 2024-04-07 PROCEDURE — 85730 THROMBOPLASTIN TIME PARTIAL: CPT | Performed by: SPECIALIST

## 2024-04-07 PROCEDURE — 92960 CARDIOVERSION ELECTRIC EXT: CPT | Mod: ,,, | Performed by: INTERNAL MEDICINE

## 2024-04-07 PROCEDURE — 63600175 PHARM REV CODE 636 W HCPCS: Performed by: INTERNAL MEDICINE

## 2024-04-07 PROCEDURE — 5A2204Z RESTORATION OF CARDIAC RHYTHM, SINGLE: ICD-10-PCS | Performed by: INTERNAL MEDICINE

## 2024-04-07 PROCEDURE — 99152 MOD SED SAME PHYS/QHP 5/>YRS: CPT | Performed by: INTERNAL MEDICINE

## 2024-04-07 PROCEDURE — 25000003 PHARM REV CODE 250: Performed by: PHYSICIAN ASSISTANT

## 2024-04-07 PROCEDURE — 94761 N-INVAS EAR/PLS OXIMETRY MLT: CPT

## 2024-04-07 PROCEDURE — 63600175 PHARM REV CODE 636 W HCPCS: Performed by: NURSE PRACTITIONER

## 2024-04-07 PROCEDURE — 92960 CARDIOVERSION ELECTRIC EXT: CPT | Performed by: INTERNAL MEDICINE

## 2024-04-07 PROCEDURE — 20000000 HC ICU ROOM

## 2024-04-07 PROCEDURE — 99233 SBSQ HOSP IP/OBS HIGH 50: CPT | Mod: 25,,, | Performed by: INTERNAL MEDICINE

## 2024-04-07 PROCEDURE — 99900035 HC TECH TIME PER 15 MIN (STAT)

## 2024-04-07 PROCEDURE — 85730 THROMBOPLASTIN TIME PARTIAL: CPT | Mod: 91 | Performed by: SPECIALIST

## 2024-04-07 RX ORDER — FUROSEMIDE 10 MG/ML
40 INJECTION INTRAMUSCULAR; INTRAVENOUS EVERY 12 HOURS
Status: DISCONTINUED | OUTPATIENT
Start: 2024-04-07 | End: 2024-04-09

## 2024-04-07 RX ORDER — MAGNESIUM SULFATE HEPTAHYDRATE 40 MG/ML
2 INJECTION, SOLUTION INTRAVENOUS ONCE
Status: COMPLETED | OUTPATIENT
Start: 2024-04-07 | End: 2024-04-07

## 2024-04-07 RX ORDER — MIDAZOLAM HYDROCHLORIDE 1 MG/ML
2 INJECTION, SOLUTION INTRAMUSCULAR; INTRAVENOUS ONCE
Status: COMPLETED | OUTPATIENT
Start: 2024-04-07 | End: 2024-04-07

## 2024-04-07 RX ORDER — DIGOXIN 125 MCG
0.12 TABLET ORAL DAILY
Status: DISCONTINUED | OUTPATIENT
Start: 2024-04-07 | End: 2024-04-08

## 2024-04-07 RX ORDER — FUROSEMIDE 10 MG/ML
40 INJECTION INTRAMUSCULAR; INTRAVENOUS DAILY
Status: DISCONTINUED | OUTPATIENT
Start: 2024-04-07 | End: 2024-04-07

## 2024-04-07 RX ORDER — AMIODARONE HYDROCHLORIDE 200 MG/1
200 TABLET ORAL 2 TIMES DAILY
Status: DISCONTINUED | OUTPATIENT
Start: 2024-04-07 | End: 2024-04-12 | Stop reason: HOSPADM

## 2024-04-07 RX ORDER — FENTANYL CITRATE 50 UG/ML
50 INJECTION, SOLUTION INTRAMUSCULAR; INTRAVENOUS ONCE
Status: COMPLETED | OUTPATIENT
Start: 2024-04-07 | End: 2024-04-07

## 2024-04-07 RX ADMIN — CLOPIDOGREL BISULFATE 75 MG: 75 TABLET ORAL at 08:04

## 2024-04-07 RX ADMIN — FENTANYL CITRATE 50 MCG: 50 INJECTION INTRAMUSCULAR; INTRAVENOUS at 09:04

## 2024-04-07 RX ADMIN — HEPARIN SODIUM 11 UNITS/KG/HR: 10000 INJECTION, SOLUTION INTRAVENOUS at 05:04

## 2024-04-07 RX ADMIN — MAGNESIUM SULFATE HEPTAHYDRATE 2 G: 40 INJECTION, SOLUTION INTRAVENOUS at 08:04

## 2024-04-07 RX ADMIN — MUPIROCIN: 20 OINTMENT TOPICAL at 09:04

## 2024-04-07 RX ADMIN — AMIODARONE HYDROCHLORIDE 0.5 MG/MIN: 1.8 INJECTION, SOLUTION INTRAVENOUS at 02:04

## 2024-04-07 RX ADMIN — FAMOTIDINE 20 MG: 20 TABLET ORAL at 08:04

## 2024-04-07 RX ADMIN — FAMOTIDINE 20 MG: 20 TABLET ORAL at 09:04

## 2024-04-07 RX ADMIN — METOPROLOL TARTRATE 25 MG: 25 TABLET, FILM COATED ORAL at 08:04

## 2024-04-07 RX ADMIN — DIGOXIN 0.12 MG: 125 TABLET ORAL at 12:04

## 2024-04-07 RX ADMIN — ALPRAZOLAM 0.25 MG: 0.25 TABLET ORAL at 09:04

## 2024-04-07 RX ADMIN — AMIODARONE HYDROCHLORIDE 200 MG: 200 TABLET ORAL at 09:04

## 2024-04-07 RX ADMIN — FUROSEMIDE 40 MG: 10 INJECTION, SOLUTION INTRAMUSCULAR; INTRAVENOUS at 08:04

## 2024-04-07 RX ADMIN — MUPIROCIN: 20 OINTMENT TOPICAL at 08:04

## 2024-04-07 RX ADMIN — AMIODARONE HYDROCHLORIDE 200 MG: 200 TABLET ORAL at 08:04

## 2024-04-07 RX ADMIN — ISOSORBIDE MONONITRATE 30 MG: 30 TABLET, EXTENDED RELEASE ORAL at 08:04

## 2024-04-07 RX ADMIN — POTASSIUM BICARBONATE 50 MEQ: 978 TABLET, EFFERVESCENT ORAL at 11:04

## 2024-04-07 RX ADMIN — ASPIRIN 81 MG: 81 TABLET, COATED ORAL at 08:04

## 2024-04-07 RX ADMIN — FUROSEMIDE 40 MG: 10 INJECTION, SOLUTION INTRAMUSCULAR; INTRAVENOUS at 09:04

## 2024-04-07 RX ADMIN — ATORVASTATIN CALCIUM 40 MG: 40 TABLET, FILM COATED ORAL at 08:04

## 2024-04-07 RX ADMIN — MORPHINE SULFATE 2 MG: 4 INJECTION INTRAVENOUS at 05:04

## 2024-04-07 RX ADMIN — MIDAZOLAM 2 MG: 1 INJECTION INTRAMUSCULAR; INTRAVENOUS at 09:04

## 2024-04-07 RX ADMIN — METOPROLOL TARTRATE 25 MG: 25 TABLET, FILM COATED ORAL at 09:04

## 2024-04-07 RX ADMIN — DIGOXIN 250 MCG: 250 INJECTION, SOLUTION INTRAMUSCULAR; INTRAVENOUS at 05:04

## 2024-04-07 RX ADMIN — HYDROCODONE BITARTRATE AND ACETAMINOPHEN 1 TABLET: 5; 325 TABLET ORAL at 11:04

## 2024-04-07 NOTE — SUBJECTIVE & OBJECTIVE
Interval History:    Review of Systems   Cardiovascular:  Negative for chest pain, dyspnea on exertion, palpitations and syncope.   Genitourinary: Negative.    Neurological: Negative.      Objective:     Vital Signs (Most Recent):  Temp: 97.5 °F (36.4 °C) (04/07/24 0705)  Pulse: 97 (04/07/24 0908)  Resp: 20 (04/07/24 0908)  BP: 128/63 (04/07/24 0905)  SpO2: 99 % (04/07/24 0908) Vital Signs (24h Range):  Temp:  [97.5 °F (36.4 °C)-98.7 °F (37.1 °C)] 97.5 °F (36.4 °C)  Pulse:  [] 97  Resp:  [18-30] 20  SpO2:  [91 %-99 %] 99 %  BP: ()/(48-81) 128/63     Weight: 129.7 kg (286 lb)  Body mass index is 39.89 kg/m².     SpO2: 99 %         Intake/Output Summary (Last 24 hours) at 4/7/2024 0908  Last data filed at 4/7/2024 0900  Gross per 24 hour   Intake 1563.6 ml   Output 2840 ml   Net -1276.4 ml       Lines/Drains/Airways       Peripherally Inserted Central Catheter Line  Duration             PICC Double Lumen 04/06/24 1325 right basilic <1 day              Drain  Duration                  Urethral Catheter 04/05/24 1600 1 day              Line  Duration                  IABP 04/05/24 1546 1 day              Peripheral Intravenous Line  Duration                  Peripheral IV - Single Lumen 04/02/24 0813 20 G Posterior;Right Forearm 5 days         Peripheral IV - Single Lumen 04/03/24 1700 22 G Posterior;Right Hand 3 days         Peripheral IV - Single Lumen 04/05/24 1609 20 G Anterior;Left Forearm 1 day                       Physical Exam  Vitals reviewed.   Constitutional:       Appearance: He is well-developed.   Neck:      Vascular: No carotid bruit.   Cardiovascular:      Rate and Rhythm: Tachycardia present. Rhythm irregular.      Pulses: Intact distal pulses.      Heart sounds: Normal heart sounds. No murmur heard.  Pulmonary:      Breath sounds: Normal breath sounds.   Neurological:      Mental Status: He is oriented to person, place, and time.      Sensory: Sensory deficit: aflutter.             Significant Labs: All pertinent lab results from the last 24 hours have been reviewed. and   Recent Lab Results         04/07/24  0400   04/06/24 2012 04/06/24  1714   04/06/24  1443        Allens Test       N/A       Anion Gap 7   7           PTT 37.0  Comment: Refer to local heparin nomogram for intensity/dose specific   therapeutic   range.       41.8  Comment: Refer to local heparin nomogram for intensity/dose specific   therapeutic   range.           Baso # 0.01              0.01             Basophil % 0.2              0.2             Site       Other       BUN 21   21           Calcium 8.5   9.0           Chloride 97   98           CO2 31   32           Creatinine 1.1   1.1           DelSys       Nasal Can       Differential Method Automated              Automated             eGFR >60   >60           Eos # 0.1              0.1             Eos % 1.3              1.3             FiO2       28       Flow       2       Glucose 96   96           Gran # (ANC) 3.3              3.3             Gran % 73.1              73.1             Hematocrit 39.0              39.0             Hemoglobin 12.8              12.8             Immature Grans (Abs) 0.01  Comment: Mild elevation in immature granulocytes is non specific and   can be seen in a variety of conditions including stress response,   acute inflammation, trauma and pregnancy. Correlation with other   laboratory and clinical findings is essential.                0.01  Comment: Mild elevation in immature granulocytes is non specific and   can be seen in a variety of conditions including stress response,   acute inflammation, trauma and pregnancy. Correlation with other   laboratory and clinical findings is essential.               Immature Granulocytes 0.2              0.2             Lymph # 0.7              0.7             Lymph % 15.1              15.1             Magnesium  1.9             MCH 32.0              32.0             MCHC 32.8               32.8             MCV 98              98             Mode       SPONT       Mono # 0.5              0.5             Mono % 10.1              10.1             MPV 9.8              9.8             nRBC 0              0             Platelet Count 155              155             POC BE       11       POC HCO3       35.6       POC PCO2       52.6       POC PH       7.439       POC PO2       31       POC SATURATED O2       60       Potassium 3.8   3.9           RBC 4.00              4.00             RDW 13.2              13.2             Sample       VENOUS       Sodium 135   137           WBC 4.57              4.57                     Significant Imaging: EKG:

## 2024-04-07 NOTE — PROGRESS NOTES
[facility] ([facility])  Critical Care Medicine  Progress Note    Patient Name: Chucho Clark  MRN: [id number]  Admission Date: 4/2/2024  Hospital Length of Stay: 4 days  Code Status: Full Code  Attending Provider: Raffi Gonzalez MD  Primary Care Provider: Ale, Primary Doctor   Principal Problem: Acute on chronic congestive heart failure    Subjective:     HPI:  78-year-old male with a known past medical history of combined heart failure (EF 20-25% and grade II diastolic failure), hypertension, hyperlipidemia, DVT, CAD, and obesity that presented to the ER 4/2 for evaluation of chest pain that began the night prior to arrival.  Of note, patient had a first-time visit with Cardiology (Dr. Corrales) on 04/1 and was initiated on Eliquis for a flutter and was stated on metoprolol. In the ED, vitals: 130/69, 121, 22, 97.8F, 95% RA. Significant Labs: BNP: 560, Trop: 0.064, Bili: 1.1. CXR: interstitial edema. EKG: Neg for STEMI. Cardiology consulted in ED. Treated with ASA, Nitro, BB, diuretic. Initiated on Heparin Infusion. Placed was observation under the care of Hospital Medicine for management of elevated troponin, ACS rule out.  During hospital stay patient underwent diuresis and is-6.5 L as of 4/5.  On 04/04 patient was taken to the cath lab and underwent RODERICK with DCCV as well as a left-to-right heart catheterization which revealed severe three-vessel disease and a consultation by CT surgery was recommended; per documentation it appears discussions were had an incision was made to take the patient back to the cath lab on 04/05 were underwent another C with PCI to the mid left circ x1 in the mid LAD x1.  In the PACU patient had an episode of flash pulmonary edema for which he required NIPPV as well as diuretics.  Balloon pump was also placed prior to transfer to the ICU. It also appears that life vest is being arranged for discharge.     Hospital/ICU Course:  4/6: no acute events overnight, pt awake and  alert, on 2L NC, denies CP or SOB or any other issues, just wants to be able to move his right leg  4/7: no acute events, a fib on monitor with controlled rate, remains with IABP in place, on NC in NAD    ROS complete and negative unless stated in the interval HPI     Objective:     Vital Signs (Most Recent):  Temp: 97.5 °F (36.4 °C) (04/07/24 0705)  Pulse: 98 (04/07/24 0734)  Resp: 18 (04/07/24 0734)  BP: (!) 130/58 (04/07/24 0705)  SpO2: 98 % (04/07/24 0734) Vital Signs (24h Range):  Temp:  [97.5 °F (36.4 °C)-98.7 °F (37.1 °C)] 97.5 °F (36.4 °C)  Pulse:  [] 98  Resp:  [18-30] 18  SpO2:  [91 %-99 %] 98 %  BP: ()/(48-81) 130/58     Weight: 129.7 kg (286 lb)  Body mass index is 39.89 kg/m².      Intake/Output Summary (Last 24 hours) at 4/7/2024 0815  Last data filed at 4/7/2024 0705  Gross per 24 hour   Intake 1509.11 ml   Output 2500 ml   Net -990.89 ml        Physical Exam  Vitals reviewed.   Constitutional:       General: He is awake. He is not in acute distress.     Appearance: He is well-developed.   Cardiovascular:      Rate and Rhythm: Normal rate. Rhythm regularly irregular.      Pulses:           Radial pulses are 2+ on the right side and 2+ on the left side.      Comments: A fib on monitor that is rate controlled   Pulmonary:      Breath sounds: Decreased breath sounds present. No wheezing.      Comments: On NC  Abdominal:      General: There is no distension.      Palpations: Abdomen is soft.   Musculoskeletal:      Right lower leg: Edema present.      Left lower leg: Edema present.      Comments: RLE immobilizer in place s/t IABP   Skin:     General: Skin is dry.   Neurological:      General: No focal deficit present.      Mental Status: He is alert.   Psychiatric:         Behavior: Behavior is cooperative.               Vents:  Oxygen Concentration (%): 28 (04/07/24 0734)    Lines/Drains/Airways       Peripherally Inserted Central Catheter Line  Duration             PICC Double Lumen 04/06/24  1325 right basilic <1 day              Drain  Duration                  Urethral Catheter 04/05/24 1600 1 day              Line  Duration                  IABP 04/05/24 1546 1 day              Peripheral Intravenous Line  Duration                  Peripheral IV - Single Lumen 04/02/24 0813 20 G Posterior;Right Forearm 5 days         Peripheral IV - Single Lumen 04/03/24 1700 22 G Posterior;Right Hand 3 days         Peripheral IV - Single Lumen 04/05/24 1609 20 G Anterior;Left Forearm 1 day                    Significant Labs:    CBC/Anemia Profile:  Recent Labs   Lab 04/05/24  1716 04/06/24  0310 04/07/24  0400   WBC 4.43 4.44 4.57  4.57   HGB 13.7* 12.3* 12.8*  12.8*   HCT 41.3 37.2* 39.0*  39.0*    153 155  155   MCV 98 97 98  98   RDW 13.7 13.6 13.2  13.2        Chemistries:  Recent Labs   Lab 04/06/24  0310 04/06/24 2012 04/07/24  0400    137 135*   K 3.3* 3.9 3.8   CL 99 98 97   CO2 30* 32* 31*   BUN 21 21 21   CREATININE 1.1 1.1 1.1   CALCIUM 9.1 9.0 8.5*   MG 1.8  --  1.9       All pertinent labs within the past 24 hours have been reviewed.    Significant Imaging:  I have reviewed all pertinent imaging results/findings within the past 24 hours.    ABG  Recent Labs   Lab 04/06/24  1443   PH 7.439   PO2 31*   PCO2 52.6*   HCO3 35.6*   BE 11*     Assessment/Plan:     Cardiac/Vascular  * Acute on chronic congestive heart failure  - history of combined HF (EF 30-35% with diastolic heart failure, severe mitral regurg and a pulmonary artery pressure 41)  - successful diuresis this admit and is currently 11.5L negative   - after heart catheterization on 04/05 patient had an episode of flash pulmonary edema with required BiPAP and diuresis, now on 2L NC, continue to wean as able  - cards managing  - potential plans to return to cath lab today for removal of IABP and cardioversion   - on ASA, statin, plavix, BB, nitrate, lasix    Atrial flutter  - s/p RODERICK and DCCV 4/4, potential repeat DCCV today  per cards  - on amio and BB  - on heparin gtt  - tele    Chest pain  - resolved    Elevated troponin  - see plan for CAD    Venous stasis dermatitis of both lower extremities  - diuresis, elevate extremities, supportive care as outlined elsewhere     Essential hypertension  - mgmt per cards  - currently on BB, nitrate, lasix  - trend hemodynamics and adjust meds as needed, trend stable     Coronary artery disease involving native coronary artery of native heart  - s/p RODERICK with DCCV and R/LHC 4/4 that revealed severe 3 vessel disease, discussions were had with CT surgery and Life Vest arranged  - s/p repeat LHC 4/5 with PCI to mid left circ x1 and mid LAD x1, placement of balloon pump  - plavix, ASA, statin, BB  - mgmt per cards    Endocrine  BMI 39.0-39.9,adult  - affecting medical decision making and complicating the above   - pt would benefit greatly from weight loss and lifestyle modifications      Prophylaxis Measures:  GI ppx: Famotidine  VTE ppx: Heparin  Glucose control: Monitor blood glucose    Code Status: Full Code     Raghu Nicole NP  Critical Care Medicine  [facility] ([facility])

## 2024-04-07 NOTE — SUBJECTIVE & OBJECTIVE
ROS complete and negative unless stated in the interval HPI     Objective:     Vital Signs (Most Recent):  Temp: 97.5 °F (36.4 °C) (04/07/24 0705)  Pulse: 98 (04/07/24 0734)  Resp: 18 (04/07/24 0734)  BP: (!) 130/58 (04/07/24 0705)  SpO2: 98 % (04/07/24 0734) Vital Signs (24h Range):  Temp:  [97.5 °F (36.4 °C)-98.7 °F (37.1 °C)] 97.5 °F (36.4 °C)  Pulse:  [] 98  Resp:  [18-30] 18  SpO2:  [91 %-99 %] 98 %  BP: ()/(48-81) 130/58     Weight: 129.7 kg (286 lb)  Body mass index is 39.89 kg/m².      Intake/Output Summary (Last 24 hours) at 4/7/2024 0815  Last data filed at 4/7/2024 0705  Gross per 24 hour   Intake 1509.11 ml   Output 2500 ml   Net -990.89 ml        Physical Exam  Vitals reviewed.   Constitutional:       General: He is awake. He is not in acute distress.     Appearance: He is well-developed.   Cardiovascular:      Rate and Rhythm: Normal rate. Rhythm regularly irregular.      Pulses:           Radial pulses are 2+ on the right side and 2+ on the left side.      Comments: A fib on monitor that is rate controlled   Pulmonary:      Breath sounds: Decreased breath sounds present. No wheezing.      Comments: On NC  Abdominal:      General: There is no distension.      Palpations: Abdomen is soft.   Musculoskeletal:      Right lower leg: Edema present.      Left lower leg: Edema present.      Comments: RLE immobilizer in place s/t IABP   Skin:     General: Skin is dry.   Neurological:      General: No focal deficit present.      Mental Status: He is alert.   Psychiatric:         Behavior: Behavior is cooperative.               Vents:  Oxygen Concentration (%): 28 (04/07/24 0734)    Lines/Drains/Airways       Peripherally Inserted Central Catheter Line  Duration             PICC Double Lumen 04/06/24 1325 right basilic <1 day              Drain  Duration                  Urethral Catheter 04/05/24 1600 1 day              Line  Duration                  IABP 04/05/24 1546 1 day              Peripheral  Intravenous Line  Duration                  Peripheral IV - Single Lumen 04/02/24 0813 20 G Posterior;Right Forearm 5 days         Peripheral IV - Single Lumen 04/03/24 1700 22 G Posterior;Right Hand 3 days         Peripheral IV - Single Lumen 04/05/24 1609 20 G Anterior;Left Forearm 1 day                    Significant Labs:    CBC/Anemia Profile:  Recent Labs   Lab 04/05/24  1716 04/06/24 0310 04/07/24  0400   WBC 4.43 4.44 4.57  4.57   HGB 13.7* 12.3* 12.8*  12.8*   HCT 41.3 37.2* 39.0*  39.0*    153 155  155   MCV 98 97 98  98   RDW 13.7 13.6 13.2  13.2        Chemistries:  Recent Labs   Lab 04/06/24 0310 04/06/24 2012 04/07/24  0400    137 135*   K 3.3* 3.9 3.8   CL 99 98 97   CO2 30* 32* 31*   BUN 21 21 21   CREATININE 1.1 1.1 1.1   CALCIUM 9.1 9.0 8.5*   MG 1.8  --  1.9       All pertinent labs within the past 24 hours have been reviewed.    Significant Imaging:  I have reviewed all pertinent imaging results/findings within the past 24 hours.

## 2024-04-07 NOTE — PLAN OF CARE
Pt AAOx4. NSR on monitor after cardioversion. BP stable. Afebrile. Tolerating diet. NPO at midnight for PCI. Ann intact, adequate UOP. Pt turned with pillows as tolerated. Heels floated. Waffle mattress intact. Bed low, wheels locked, alarms audible. POC reviewed with family.

## 2024-04-07 NOTE — BRIEF OP NOTE
[facility] (H[facility])  Brief Operative Note  Cardiology    SUMMARY     Surgery Date: 4/5/2024     Surgeon(s) and Role:     * Suzanna Hernandez MD - Primary    Assisting Surgeon: None    Pre-op Diagnosis:  Congestive heart failure, unspecified HF chronicity, unspecified heart failure type [I50.9]    Post-op Diagnosis: Post-Op Diagnosis Codes:     * Congestive heart failure, unspecified HF chronicity, unspecified heart failure type [I50.9]    Procedure Performed: CARDIOVERSION  910 am   Family consented and agreeable       Technical Procedures Used:     Operative Findings:   DCCV performed with moderate sedation , fentanyl 50 mcg in total and 2 mg versed total   Went into afib after first shock but rate controlled   Second shock still afib despite manual pressure on pads  Third shock again with forced manual pressure was successful with PAC's and PVC with sinus bradycardia     Continue amio drip , change digoxin to po     Cvp at 6 yesterday, lasix was held for evening dose   Cvp 9 this morning , lasix resumed    Npo after midnight for possible RCA  PCI with Dr Mayers   Cw heparin  Will remain on iabp today  Will repeat VBG     Pedal pulse pounding 2+   Estimated Blood Loss: * No values recorded between 4/5/2024  3:46 PM and 4/5/2024  4:12 PM *         Specimens:   Specimen (24h ago, onward)      None

## 2024-04-07 NOTE — ASSESSMENT & PLAN NOTE
- history of combined HF (EF 30-35% with diastolic heart failure, severe mitral regurg and a pulmonary artery pressure 41)  - successful diuresis this admit and is currently 11.5L negative   - after heart catheterization on 04/05 patient had an episode of flash pulmonary edema with required BiPAP and diuresis, now on 2L NC, continue to wean as able  - cards managing  - potential plans to return to cath lab today for removal of IABP and cardioversion   - on ASA, statin, plavix, BB, nitrate, lasix

## 2024-04-07 NOTE — ASSESSMENT & PLAN NOTE
- mgmt per cards  - currently on BB, nitrate, lasix  - trend hemodynamics and adjust meds as needed, trend stable

## 2024-04-07 NOTE — PROGRESS NOTES
[facility] - Intensive Care ([facility])  Cardiology  Progress Note    Patient Name: Chucho Clark  MRN: [id number]  Admission Date: 4/2/2024  Hospital Length of Stay: 4 days  Code Status: Full Code   Attending Physician: Raffi Gonzalez MD   Primary Care Physician: Ale, Primary Doctor  Expected Discharge Date:   Principal Problem:Acute on chronic congestive heart failure    Subjective:     Hospital Course:   4/3/24-Patient seen and examined today, resting in bed. Feeling better. Less SOB, diuresed well overnight. Still has significant BLE edema. Labs reviewed/stable. EKG with aflutter. TTE with EF of 20-25%, decreased RV function, severe MR. Troponin flat. Discussed ischemic workup possibly tmw pending clinical condition.     4.4.2024  Still in aflutter, swelling significantly improved   In bed, laying comfortably    4/5/24-Patient seen and examined today, s/p R/LHC yesterday which showed severe triple vessel disease. Underwent RODERICK/DCCV with restoration of SR. Feels ok. Does have some increase in SOB/orthopnea. No CP symptoms. Remains in SR. Reviewed case with patient/CTS, will proceed with repeat LHC today and PCI. LifeVest ordered for SCD prevention.    4.7.2024  Still in aflutter  Discussed with son and patient , sister   Agreeable with dccv    Interval History:    Review of Systems   Cardiovascular:  Negative for chest pain, dyspnea on exertion, palpitations and syncope.   Genitourinary: Negative.    Neurological: Negative.      Objective:     Vital Signs (Most Recent):  Temp: 97.5 °F (36.4 °C) (04/07/24 0705)  Pulse: 97 (04/07/24 0908)  Resp: 20 (04/07/24 0908)  BP: 128/63 (04/07/24 0905)  SpO2: 99 % (04/07/24 0908) Vital Signs (24h Range):  Temp:  [97.5 °F (36.4 °C)-98.7 °F (37.1 °C)] 97.5 °F (36.4 °C)  Pulse:  [] 97  Resp:  [18-30] 20  SpO2:  [91 %-99 %] 99 %  BP: ()/(48-81) 128/63     Weight: 129.7 kg (286 lb)  Body mass index is 39.89 kg/m².     SpO2: 99 %         Intake/Output Summary (Last 24  hours) at 4/7/2024 0908  Last data filed at 4/7/2024 0900  Gross per 24 hour   Intake 1563.6 ml   Output 2840 ml   Net -1276.4 ml       Lines/Drains/Airways       Peripherally Inserted Central Catheter Line  Duration             PICC Double Lumen 04/06/24 1325 right basilic <1 day              Drain  Duration                  Urethral Catheter 04/05/24 1600 1 day              Line  Duration                  IABP 04/05/24 1546 1 day              Peripheral Intravenous Line  Duration                  Peripheral IV - Single Lumen 04/02/24 0813 20 G Posterior;Right Forearm 5 days         Peripheral IV - Single Lumen 04/03/24 1700 22 G Posterior;Right Hand 3 days         Peripheral IV - Single Lumen 04/05/24 1609 20 G Anterior;Left Forearm 1 day                       Physical Exam  Vitals reviewed.   Constitutional:       Appearance: He is well-developed.   Neck:      Vascular: No carotid bruit.   Cardiovascular:      Rate and Rhythm: Tachycardia present. Rhythm irregular.      Pulses: Intact distal pulses.      Heart sounds: Normal heart sounds. No murmur heard.  Pulmonary:      Breath sounds: Normal breath sounds.   Neurological:      Mental Status: He is oriented to person, place, and time.      Sensory: Sensory deficit: aflutter.            Significant Labs: All pertinent lab results from the last 24 hours have been reviewed. and   Recent Lab Results         04/07/24  0400   04/06/24  2012   04/06/24  1714   04/06/24  1443        Allens Test       N/A       Anion Gap 7   7           PTT 37.0  Comment: Refer to local heparin nomogram for intensity/dose specific   therapeutic   range.       41.8  Comment: Refer to local heparin nomogram for intensity/dose specific   therapeutic   range.           Baso # 0.01              0.01             Basophil % 0.2              0.2             Site       Other       BUN 21   21           Calcium 8.5   9.0           Chloride 97   98           CO2 31   32           Creatinine 1.1    1.1           DelSys       Nasal Can       Differential Method Automated              Automated             eGFR >60   >60           Eos # 0.1              0.1             Eos % 1.3              1.3             FiO2       28       Flow       2       Glucose 96   96           Gran # (ANC) 3.3              3.3             Gran % 73.1              73.1             Hematocrit 39.0              39.0             Hemoglobin 12.8              12.8             Immature Grans (Abs) 0.01  Comment: Mild elevation in immature granulocytes is non specific and   can be seen in a variety of conditions including stress response,   acute inflammation, trauma and pregnancy. Correlation with other   laboratory and clinical findings is essential.                0.01  Comment: Mild elevation in immature granulocytes is non specific and   can be seen in a variety of conditions including stress response,   acute inflammation, trauma and pregnancy. Correlation with other   laboratory and clinical findings is essential.               Immature Granulocytes 0.2              0.2             Lymph # 0.7              0.7             Lymph % 15.1              15.1             Magnesium  1.9             MCH 32.0              32.0             MCHC 32.8              32.8             MCV 98              98             Mode       SPONT       Mono # 0.5              0.5             Mono % 10.1              10.1             MPV 9.8              9.8             nRBC 0              0             Platelet Count 155              155             POC BE       11       POC HCO3       35.6       POC PCO2       52.6       POC PH       7.439       POC PO2       31       POC SATURATED O2       60       Potassium 3.8   3.9           RBC 4.00              4.00             RDW 13.2              13.2             Sample       VENOUS       Sodium 135   137           WBC 4.57              4.57                     Significant Imaging: EKG:    Assessment and Plan:     Brief  HPI:     * Acute on chronic congestive heart failure  Patient is identified as having Diastolic (HFpEF) heart failure that is Acute. CHF is currently uncontrolled due to Pulmonary edema/pleural effusion on CXR. Latest ECHO performed and demonstrates- No results found for this or any previous visit.  . Continue Furosemide and monitor clinical status closely. Monitor on telemetry. Patient is on CHF pathway.  Monitor strict Is&Os and daily weights.  Place on fluid restriction of 2 L. Cardiology has been consulted. Continue to stress to patient importance of self efficacy and  on diet for CHF. Last BNP reviewed- and noted below   Recent Labs   Lab 04/02/24  0817   *     Cw iv lasix     4/3/24  -TTE with EF of 20-25%, moderately reduced RV function, pulmonary HTN, severe MR  -Continue IV diuresis  -Continue BB  -Will add ARB vs Entresto pending creatinine/BP trend  -Strict I's/O's  -Probable R/LHC tmw pending reassessment    4.4.2024  Right and left heart catheterization today.    Lasix held due to drop in pressure yesterday evening.    Discussed risks and benefits with the family, son and daughter at bedside.    4/5/24  -More SOB this AM with cough/orthopnea  -IV Lasix x one dose  -Continue Aldactone, BB    4.6.2024  Continue with balloon pump for today.    We will get a central line.  Discussed with ICU staff.  Check CVP and mixed venous.    Lactic acid normal.    Continue with IV diuresis.        On intra-aortic balloon pump assist  Continue today.    Placed yesterday after flash pulmonary edema in recovery post PCI.        Left bundle branch block  We will have him evaluated by EP for possible CRT.        Atrial flutter  -Currently in aflutter HR low 100's  -Contributing to volume status/cardiomyopathy  -Continue BB  -Start amiodarone drip  -Continue heparin drip  -Will need RODERICK/DCCV post ischemic workup as well as EP evaluation to discuss ablation in future    4.4.2024  RODERICK cardioversion today after  heart catheterization.    4/5/24  -Remains in SR s/p RODERICK/DCCV  -Amiodarone switched to po  -Continue BB  -Heparin gtt for now, will need Eliquis upon d/c    4.6.2024  BACK IN A FLUTTER THIS MORNING.  We will resume IV amiodarone.  Continue with p.o..    Add digoxin IV.    Continue with beta-blockers.    Continue with balloon pump for now.    He may need a repeat cardioversion if does not convert back to normal with medications.    Future plan for ablation as well.      4.7.2024  Dccv today   Family and patient agreeable       Orthopnea  -IV Lasix x one dose    Chest pain  Heparin iv   Trend trop   Echo   Aspirin   Will need ischemic work up prior to discharge  Cw toprol    4/3/24  -Stable, troponin flat  -Continue ASA, BB, statin, heparin gtt  -Probable LHC tmw pending reassessment of volume status    4/5/24  -Multivessel CAD on cath    Elevated troponin  -Flat  -Likely secondary to demand ischemia from new onset atrial flutter and combined CHF  -Continue ASA, BB, heparin gtt, statin  -Ischemic workup with LHC once better compensated    4/5/24  -s/p LHC which showed triple vessel disease  -Case discussed with CTS and patient/family, repeat LHC today with PCI. Dr. Hernandez explained all risks, benefits, and treatment alternatives. All questions answered. Patient agreeable with proceeding.     Essential hypertension  -Continue BB  -Monitor BP trend    Coronary artery disease involving native coronary artery of native heart  04/06/2024.    Status post PCI to LAD and circumflex yesterday.    He will eventually need at this point PCI of his RCA  to help improve his mitral valve function        VTE Risk Mitigation (From admission, onward)           Ordered     heparin 25,000 units in dextrose 5% (100 units/ml) IV bolus from bag LOW INTENSITY nomogram - [facility]  As needed (PRN)        Question:  Heparin Infusion Adjustment (DO NOT MODIFY ANSWER)  Answer:  [email or web address]\Images\Pharmacy\HeparinInfusions\heparin LOW  INTENSITY nomogram for [facility] [id number]    04/05/24 1558     heparin 25,000 units in dextrose 5% (100 units/ml) IV bolus from bag LOW INTENSITY nomogram - [facility]  As needed (PRN)        Question:  Heparin Infusion Adjustment (DO NOT MODIFY ANSWER)  Answer:  [email or web address]\Images\Pharmacy\HeparinInfusions\heparin LOW INTENSITY nomogram for [facility] [id number]    04/05/24 1558     heparin 25,000 units in dextrose 5% (100 units/ml) IV bolus from bag LOW INTENSITY nomogram - [facility]  Once        Question:  Heparin Infusion Adjustment (DO NOT MODIFY ANSWER)  Answer:  [email or web address]\Images\Pharmacy\HeparinInfusions\heparin LOW INTENSITY nomogram for [facility] [id number]    04/05/24 1558     heparin 25,000 units in dextrose 5% 250 mL (100 units/mL) infusion LOW INTENSITY nomogram - [facility]  Continuous        Question:  Begin at (units/kg/hr)  Answer:  12    04/05/24 1558     Reason for No Pharmacological VTE Prophylaxis  Once        Comments: Heparin Infusion   Question:  Reasons:  Answer:  Physician Provided (leave comment)    04/02/24 1153     IP VTE HIGH RISK PATIENT  Once         04/02/24 1153     Place sequential compression device  Until discontinued         04/02/24 1153                    Suzanna Hernandez MD  Cardiology  '[place] - Intensive Care ([facility])

## 2024-04-07 NOTE — ASSESSMENT & PLAN NOTE
- s/p RODERICK and DCCV 4/4, potential repeat DCCV today per cards  - on amio and BB  - on heparin gtt  - tele

## 2024-04-07 NOTE — ASSESSMENT & PLAN NOTE
-Currently in aflutter HR low 100's  -Contributing to volume status/cardiomyopathy  -Continue BB  -Start amiodarone drip  -Continue heparin drip  -Will need RODERICK/DCCV post ischemic workup as well as EP evaluation to discuss ablation in future    4.4.2024  RODERICK cardioversion today after heart catheterization.    4/5/24  -Remains in SR s/p RODERICK/DCCV  -Amiodarone switched to po  -Continue BB  -Heparin gtt for now, will need Eliquis upon d/c    4.6.2024  BACK IN A FLUTTER THIS MORNING.  We will resume IV amiodarone.  Continue with p.o..    Add digoxin IV.    Continue with beta-blockers.    Continue with balloon pump for now.    He may need a repeat cardioversion if does not convert back to normal with medications.    Future plan for ablation as well.      4.7.2024  Dccv today   Family and patient agreeable

## 2024-04-07 NOTE — PLAN OF CARE
AAOX4.  Aflutter with controlled rate, 2LNC, afebrile, normotensive.  NPO at midnight for cardioversion this morning.   Per Cards lasix discontinued.   Ann in place with 950mlUOP/shift  IABP right fem, site looks good, pt reports no complaints with site.  Heparin gtt infusing, titrated per nomogram.  Amio gtt infusing at 0.5.    Call light in reach,   All alarms audible and appropriate

## 2024-04-08 LAB
ANION GAP SERPL CALC-SCNC: 6 MMOL/L (ref 8–16)
APTT PPP: 48.7 SEC (ref 21–32)
BASOPHILS # BLD AUTO: 0.01 K/UL (ref 0–0.2)
BASOPHILS NFR BLD: 0.2 % (ref 0–1.9)
BUN SERPL-MCNC: 22 MG/DL (ref 8–23)
CALCIUM SERPL-MCNC: 8.7 MG/DL (ref 8.7–10.5)
CHLORIDE SERPL-SCNC: 95 MMOL/L (ref 95–110)
CO2 SERPL-SCNC: 31 MMOL/L (ref 23–29)
CREAT SERPL-MCNC: 1.1 MG/DL (ref 0.5–1.4)
DIFFERENTIAL METHOD BLD: ABNORMAL
DIGOXIN SERPL-MCNC: 0.4 NG/ML (ref 0.8–2)
EOSINOPHIL # BLD AUTO: 0.1 K/UL (ref 0–0.5)
EOSINOPHIL NFR BLD: 1.4 % (ref 0–8)
ERYTHROCYTE [DISTWIDTH] IN BLOOD BY AUTOMATED COUNT: 13.2 % (ref 11.5–14.5)
EST. GFR  (NO RACE VARIABLE): >60 ML/MIN/1.73 M^2
GLUCOSE SERPL-MCNC: 101 MG/DL (ref 70–110)
HCT VFR BLD AUTO: 37.7 % (ref 40–54)
HGB BLD-MCNC: 12.4 G/DL (ref 14–18)
IMM GRANULOCYTES # BLD AUTO: 0.01 K/UL (ref 0–0.04)
IMM GRANULOCYTES NFR BLD AUTO: 0.2 % (ref 0–0.5)
LYMPHOCYTES # BLD AUTO: 0.7 K/UL (ref 1–4.8)
LYMPHOCYTES NFR BLD: 12.9 % (ref 18–48)
MAGNESIUM SERPL-MCNC: 2.1 MG/DL (ref 1.6–2.6)
MCH RBC QN AUTO: 31.6 PG (ref 27–31)
MCHC RBC AUTO-ENTMCNC: 32.9 G/DL (ref 32–36)
MCV RBC AUTO: 96 FL (ref 82–98)
MONOCYTES # BLD AUTO: 0.5 K/UL (ref 0.3–1)
MONOCYTES NFR BLD: 9.9 % (ref 4–15)
NEUTROPHILS # BLD AUTO: 3.9 K/UL (ref 1.8–7.7)
NEUTROPHILS NFR BLD: 75.4 % (ref 38–73)
NRBC BLD-RTO: 0 /100 WBC
PHOSPHATE SERPL-MCNC: 3.5 MG/DL (ref 2.7–4.5)
PLATELET # BLD AUTO: 153 K/UL (ref 150–450)
PMV BLD AUTO: 9.5 FL (ref 9.2–12.9)
POC ACTIVATED CLOTTING TIME K: 244 SEC (ref 74–137)
POC ACTIVATED CLOTTING TIME K: 266 SEC (ref 74–137)
POCT GLUCOSE: 136 MG/DL (ref 70–110)
POTASSIUM SERPL-SCNC: 4.1 MMOL/L (ref 3.5–5.1)
RBC # BLD AUTO: 3.92 M/UL (ref 4.6–6.2)
SAMPLE: ABNORMAL
SAMPLE: ABNORMAL
SODIUM SERPL-SCNC: 132 MMOL/L (ref 136–145)
WBC # BLD AUTO: 5.13 K/UL (ref 3.9–12.7)

## 2024-04-08 PROCEDURE — 25000003 PHARM REV CODE 250: Performed by: INTERNAL MEDICINE

## 2024-04-08 PROCEDURE — 92943 PRQ TRLUML REVSC CH OCC ANT: CPT | Mod: RC,,, | Performed by: INTERNAL MEDICINE

## 2024-04-08 PROCEDURE — 25000003 PHARM REV CODE 250: Performed by: NURSE PRACTITIONER

## 2024-04-08 PROCEDURE — 94761 N-INVAS EAR/PLS OXIMETRY MLT: CPT

## 2024-04-08 PROCEDURE — 99152 MOD SED SAME PHYS/QHP 5/>YRS: CPT | Performed by: INTERNAL MEDICINE

## 2024-04-08 PROCEDURE — A4216 STERILE WATER/SALINE, 10 ML: HCPCS | Performed by: NURSE PRACTITIONER

## 2024-04-08 PROCEDURE — 83735 ASSAY OF MAGNESIUM: CPT | Performed by: NURSE PRACTITIONER

## 2024-04-08 PROCEDURE — 99233 SBSQ HOSP IP/OBS HIGH 50: CPT | Mod: ,,, | Performed by: INTERNAL MEDICINE

## 2024-04-08 PROCEDURE — C1874 STENT, COATED/COV W/DEL SYS: HCPCS | Performed by: INTERNAL MEDICINE

## 2024-04-08 PROCEDURE — 63600175 PHARM REV CODE 636 W HCPCS: Performed by: INTERNAL MEDICINE

## 2024-04-08 PROCEDURE — 99900035 HC TECH TIME PER 15 MIN (STAT)

## 2024-04-08 PROCEDURE — A4216 STERILE WATER/SALINE, 10 ML: HCPCS | Performed by: INTERNAL MEDICINE

## 2024-04-08 PROCEDURE — C1757 CATH, THROMBECTOMY/EMBOLECT: HCPCS | Performed by: INTERNAL MEDICINE

## 2024-04-08 PROCEDURE — C1894 INTRO/SHEATH, NON-LASER: HCPCS | Performed by: INTERNAL MEDICINE

## 2024-04-08 PROCEDURE — C9600 PERC DRUG-EL COR STENT SING: HCPCS | Mod: RC | Performed by: INTERNAL MEDICINE

## 2024-04-08 PROCEDURE — 80048 BASIC METABOLIC PNL TOTAL CA: CPT | Performed by: NURSE PRACTITIONER

## 2024-04-08 PROCEDURE — 25500020 PHARM REV CODE 255: Performed by: INTERNAL MEDICINE

## 2024-04-08 PROCEDURE — 92929 PR STENT, ADD'L VESSEL: CPT | Mod: RC,,, | Performed by: INTERNAL MEDICINE

## 2024-04-08 PROCEDURE — C1876 STENT, NON-COA/NON-COV W/DEL: HCPCS | Performed by: INTERNAL MEDICINE

## 2024-04-08 PROCEDURE — C1887 CATHETER, GUIDING: HCPCS | Performed by: INTERNAL MEDICINE

## 2024-04-08 PROCEDURE — 027137Z DILATION OF CORONARY ARTERY, TWO ARTERIES WITH FOUR OR MORE DRUG-ELUTING INTRALUMINAL DEVICES, PERCUTANEOUS APPROACH: ICD-10-PCS | Performed by: INTERNAL MEDICINE

## 2024-04-08 PROCEDURE — 80162 ASSAY OF DIGOXIN TOTAL: CPT | Performed by: INTERNAL MEDICINE

## 2024-04-08 PROCEDURE — 92929 HC STENT PLACE, ADD'L BRANCH: CPT | Mod: RC | Performed by: INTERNAL MEDICINE

## 2024-04-08 PROCEDURE — C1760 CLOSURE DEV, VASC: HCPCS | Performed by: INTERNAL MEDICINE

## 2024-04-08 PROCEDURE — 02C03ZZ EXTIRPATION OF MATTER FROM CORONARY ARTERY, ONE ARTERY, PERCUTANEOUS APPROACH: ICD-10-PCS | Performed by: INTERNAL MEDICINE

## 2024-04-08 PROCEDURE — 27201423 OPTIME MED/SURG SUP & DEVICES STERILE SUPPLY: Performed by: INTERNAL MEDICINE

## 2024-04-08 PROCEDURE — 27000221 HC OXYGEN, UP TO 24 HOURS

## 2024-04-08 PROCEDURE — C1725 CATH, TRANSLUMIN NON-LASER: HCPCS | Performed by: INTERNAL MEDICINE

## 2024-04-08 PROCEDURE — 99152 MOD SED SAME PHYS/QHP 5/>YRS: CPT | Mod: ,,, | Performed by: INTERNAL MEDICINE

## 2024-04-08 PROCEDURE — 92921 HC PTCA , ADD'L BRANCH: CPT | Mod: RC | Performed by: INTERNAL MEDICINE

## 2024-04-08 PROCEDURE — C1769 GUIDE WIRE: HCPCS | Performed by: INTERNAL MEDICINE

## 2024-04-08 PROCEDURE — 85347 COAGULATION TIME ACTIVATED: CPT | Performed by: INTERNAL MEDICINE

## 2024-04-08 PROCEDURE — 85025 COMPLETE CBC W/AUTO DIFF WBC: CPT | Performed by: INTERNAL MEDICINE

## 2024-04-08 PROCEDURE — 85730 THROMBOPLASTIN TIME PARTIAL: CPT | Performed by: SPECIALIST

## 2024-04-08 PROCEDURE — 20000000 HC ICU ROOM

## 2024-04-08 PROCEDURE — 99153 MOD SED SAME PHYS/QHP EA: CPT | Performed by: INTERNAL MEDICINE

## 2024-04-08 PROCEDURE — C9607 PERC D-E COR REVASC CHRO SIN: HCPCS | Mod: RC | Performed by: INTERNAL MEDICINE

## 2024-04-08 PROCEDURE — 84100 ASSAY OF PHOSPHORUS: CPT | Performed by: NURSE PRACTITIONER

## 2024-04-08 DEVICE — EVEROLIMUS-ELUTING PLATINUM CHROMIUM CORONARY STENT SYSTEM
Type: IMPLANTABLE DEVICE | Site: CORONARY | Status: FUNCTIONAL
Brand: SYNERGY MEGATRON™

## 2024-04-08 DEVICE — ANGIO-SEAL VIP VASCULAR CLOSURE DEVICE
Type: IMPLANTABLE DEVICE | Site: CORONARY | Status: FUNCTIONAL
Brand: ANGIO-SEAL

## 2024-04-08 DEVICE — EVEROLIMUS-ELUTING PLATINUM CHROMIUM CORONARY STENT SYSTEM
Type: IMPLANTABLE DEVICE | Site: CORONARY | Status: FUNCTIONAL
Brand: SYNERGY™ XD

## 2024-04-08 RX ORDER — ONDANSETRON 8 MG/1
8 TABLET, ORALLY DISINTEGRATING ORAL EVERY 8 HOURS PRN
Status: DISCONTINUED | OUTPATIENT
Start: 2024-04-08 | End: 2024-04-12 | Stop reason: HOSPADM

## 2024-04-08 RX ORDER — PHENYLEPHRINE HYDROCHLORIDE 10 MG/ML
INJECTION INTRAVENOUS
Status: DISCONTINUED | OUTPATIENT
Start: 2024-04-08 | End: 2024-04-08 | Stop reason: HOSPADM

## 2024-04-08 RX ORDER — LIDOCAINE HYDROCHLORIDE 20 MG/ML
INJECTION, SOLUTION EPIDURAL; INFILTRATION; INTRACAUDAL; PERINEURAL
Status: DISCONTINUED | OUTPATIENT
Start: 2024-04-08 | End: 2024-04-08 | Stop reason: HOSPADM

## 2024-04-08 RX ORDER — NITROGLYCERIN 5 MG/ML
INJECTION, SOLUTION INTRAVENOUS
Status: DISCONTINUED | OUTPATIENT
Start: 2024-04-08 | End: 2024-04-08 | Stop reason: HOSPADM

## 2024-04-08 RX ORDER — HEPARIN SODIUM 1000 [USP'U]/ML
INJECTION INTRAVENOUS; SUBCUTANEOUS
Status: DISCONTINUED | OUTPATIENT
Start: 2024-04-08 | End: 2024-04-08 | Stop reason: HOSPADM

## 2024-04-08 RX ORDER — MIDAZOLAM HYDROCHLORIDE 1 MG/ML
INJECTION, SOLUTION INTRAMUSCULAR; INTRAVENOUS
Status: DISCONTINUED | OUTPATIENT
Start: 2024-04-08 | End: 2024-04-08 | Stop reason: HOSPADM

## 2024-04-08 RX ORDER — POLYETHYLENE GLYCOL 3350 17 G/17G
17 POWDER, FOR SOLUTION ORAL 2 TIMES DAILY PRN
Status: DISCONTINUED | OUTPATIENT
Start: 2024-04-08 | End: 2024-04-10

## 2024-04-08 RX ORDER — CLOPIDOGREL BISULFATE 300 MG/1
TABLET, FILM COATED ORAL
Status: DISCONTINUED | OUTPATIENT
Start: 2024-04-08 | End: 2024-04-08 | Stop reason: HOSPADM

## 2024-04-08 RX ORDER — SODIUM CHLORIDE 9 MG/ML
INJECTION, SOLUTION INTRAVENOUS CONTINUOUS
Status: DISCONTINUED | OUTPATIENT
Start: 2024-04-08 | End: 2024-04-08

## 2024-04-08 RX ORDER — SODIUM CHLORIDE 9 MG/ML
INJECTION, SOLUTION INTRAVENOUS
Status: DISCONTINUED | OUTPATIENT
Start: 2024-04-08 | End: 2024-04-10

## 2024-04-08 RX ORDER — ACETAMINOPHEN 325 MG/1
650 TABLET ORAL EVERY 4 HOURS PRN
Status: DISCONTINUED | OUTPATIENT
Start: 2024-04-08 | End: 2024-04-10

## 2024-04-08 RX ORDER — CEFAZOLIN SODIUM 1 G/3ML
INJECTION, POWDER, FOR SOLUTION INTRAMUSCULAR; INTRAVENOUS
Status: DISCONTINUED | OUTPATIENT
Start: 2024-04-08 | End: 2024-04-08 | Stop reason: HOSPADM

## 2024-04-08 RX ORDER — SODIUM CHLORIDE 9 MG/ML
INJECTION, SOLUTION INTRAVENOUS CONTINUOUS
Status: ACTIVE | OUTPATIENT
Start: 2024-04-08 | End: 2024-04-08

## 2024-04-08 RX ORDER — FENTANYL CITRATE 50 UG/ML
INJECTION, SOLUTION INTRAMUSCULAR; INTRAVENOUS
Status: DISCONTINUED | OUTPATIENT
Start: 2024-04-08 | End: 2024-04-08 | Stop reason: HOSPADM

## 2024-04-08 RX ADMIN — ISOSORBIDE MONONITRATE 30 MG: 30 TABLET, EXTENDED RELEASE ORAL at 08:04

## 2024-04-08 RX ADMIN — SODIUM CHLORIDE, PRESERVATIVE FREE 10 ML: 5 INJECTION INTRAVENOUS at 12:04

## 2024-04-08 RX ADMIN — FUROSEMIDE 40 MG: 10 INJECTION, SOLUTION INTRAMUSCULAR; INTRAVENOUS at 08:04

## 2024-04-08 RX ADMIN — CLOPIDOGREL BISULFATE 75 MG: 75 TABLET ORAL at 08:04

## 2024-04-08 RX ADMIN — DIGOXIN 0.12 MG: 125 TABLET ORAL at 08:04

## 2024-04-08 RX ADMIN — SODIUM CHLORIDE: 9 INJECTION, SOLUTION INTRAVENOUS at 12:04

## 2024-04-08 RX ADMIN — Medication 6 MG: at 09:04

## 2024-04-08 RX ADMIN — HYDROCODONE BITARTRATE AND ACETAMINOPHEN 1 TABLET: 5; 325 TABLET ORAL at 08:04

## 2024-04-08 RX ADMIN — ASPIRIN 81 MG: 81 TABLET, COATED ORAL at 08:04

## 2024-04-08 RX ADMIN — SODIUM CHLORIDE: 9 INJECTION, SOLUTION INTRAVENOUS at 03:04

## 2024-04-08 RX ADMIN — SODIUM CHLORIDE, PRESERVATIVE FREE 10 ML: 5 INJECTION INTRAVENOUS at 06:04

## 2024-04-08 RX ADMIN — METOPROLOL TARTRATE 25 MG: 25 TABLET, FILM COATED ORAL at 09:04

## 2024-04-08 RX ADMIN — ALPRAZOLAM 0.25 MG: 0.25 TABLET ORAL at 05:04

## 2024-04-08 RX ADMIN — METOPROLOL TARTRATE 25 MG: 25 TABLET, FILM COATED ORAL at 08:04

## 2024-04-08 RX ADMIN — ATORVASTATIN CALCIUM 40 MG: 40 TABLET, FILM COATED ORAL at 08:04

## 2024-04-08 RX ADMIN — MORPHINE SULFATE 2 MG: 4 INJECTION INTRAVENOUS at 09:04

## 2024-04-08 RX ADMIN — FAMOTIDINE 20 MG: 20 TABLET ORAL at 09:04

## 2024-04-08 RX ADMIN — MUPIROCIN: 20 OINTMENT TOPICAL at 08:04

## 2024-04-08 RX ADMIN — FAMOTIDINE 20 MG: 20 TABLET ORAL at 08:04

## 2024-04-08 RX ADMIN — HYDROCODONE BITARTRATE AND ACETAMINOPHEN 1 TABLET: 5; 325 TABLET ORAL at 03:04

## 2024-04-08 RX ADMIN — AMIODARONE HYDROCHLORIDE 0.5 MG/MIN: 1.8 INJECTION, SOLUTION INTRAVENOUS at 02:04

## 2024-04-08 RX ADMIN — FUROSEMIDE 40 MG: 10 INJECTION, SOLUTION INTRAMUSCULAR; INTRAVENOUS at 09:04

## 2024-04-08 RX ADMIN — AMIODARONE HYDROCHLORIDE 200 MG: 200 TABLET ORAL at 08:04

## 2024-04-08 RX ADMIN — AMIODARONE HYDROCHLORIDE 200 MG: 200 TABLET ORAL at 09:04

## 2024-04-08 NOTE — SUBJECTIVE & OBJECTIVE
Objective:     Vital Signs (Most Recent):  Temp: 97.5 °F (36.4 °C) (04/08/24 0701)  Pulse: (!) 56 (04/08/24 1000)  Resp: (!) 21 (04/08/24 1000)  BP: 120/75 (04/08/24 1000)  SpO2: 97 % (04/08/24 1000) Vital Signs (24h Range):  Temp:  [97.5 °F (36.4 °C)-98 °F (36.7 °C)] 97.5 °F (36.4 °C)  Pulse:  [56-70] 56  Resp:  [14-31] 21  SpO2:  [91 %-100 %] 97 %  BP: ()/(46-90) 120/75     Weight: 117.1 kg (258 lb 2.5 oz)  Body mass index is 36.01 kg/m².  Intake/Output Summary (Last 24 hours) at 4/8/2024 1054  Last data filed at 4/8/2024 1000  Gross per 24 hour   Intake 946.78 ml   Output 1670 ml   Net -723.22 ml     Physical Exam  Constitutional:       General: He is not in acute distress.     Appearance: He is obese. He is ill-appearing.   HENT:      Head: Normocephalic.   Eyes:      General: No scleral icterus.     Conjunctiva/sclera: Conjunctivae normal.   Cardiovascular:      Rate and Rhythm: Normal rate and regular rhythm.      Pulses:           Dorsalis pedis pulses are 1+ on the right side and 1+ on the left side.      Comments: NSR on monitor   Pulmonary:      Effort: Pulmonary effort is normal. No respiratory distress.      Breath sounds: No wheezing.   Abdominal:      Palpations: Abdomen is soft.      Tenderness: There is no abdominal tenderness.   Musculoskeletal:      Right lower leg: Edema present.      Left lower leg: Edema present.      Comments: IABP site to R thigh  RLE in immobilizer    Skin:     General: Skin is warm.      Capillary Refill: Capillary refill takes 2 to 3 seconds.   Neurological:      Mental Status: He is alert and oriented to person, place, and time.   Psychiatric:         Mood and Affect: Mood normal.         Behavior: Behavior normal.         Thought Content: Thought content normal.         Judgment: Judgment normal.      Review of Systems   Constitutional:  Negative for fatigue and fever.   Respiratory:  Negative for chest tightness and shortness of breath.    Cardiovascular:   Negative for chest pain and palpitations.   Gastrointestinal:  Negative for abdominal pain, nausea and vomiting.   Genitourinary:  Negative for difficulty urinating and dysuria.   Musculoskeletal:         Positive for pain in R thigh   Neurological:  Positive for weakness. Negative for headaches.   Psychiatric/Behavioral:  Negative for agitation and confusion.      Vents:  Oxygen Concentration (%): 28 (04/08/24 0711)    Lines/Drains/Airways       Peripherally Inserted Central Catheter Line  Duration             PICC Double Lumen 04/06/24 1325 right basilic 1 day              Drain  Duration                  Urethral Catheter 04/05/24 1600 2 days              Line  Duration                  IABP 04/05/24 1546 2 days              Peripheral Intravenous Line  Duration                  Peripheral IV - Single Lumen 04/05/24 1609 20 G Anterior;Left Forearm 2 days                  Significant Labs:    CBC/Anemia Profile:  Recent Labs   Lab 04/07/24  0400 04/08/24  0503   WBC 4.57  4.57 5.13   HGB 12.8*  12.8* 12.4*   HCT 39.0*  39.0* 37.7*     155 153   MCV 98  98 96   RDW 13.2  13.2 13.2     Chemistries:  Recent Labs   Lab 04/07/24  0400 04/07/24  2220 04/08/24  0503   * 133* 132*   K 3.8 3.8 4.1   CL 97 95 95   CO2 31* 29 31*   BUN 21 21 22   CREATININE 1.1 1.0 1.1   CALCIUM 8.5* 8.8 8.7   MG 1.9  --  2.1   PHOS  --   --  3.5     Significant Imaging:  No new imaging in past 24 hours

## 2024-04-08 NOTE — PROGRESS NOTES
[facility] ([facility])  Cardiology  Progress Note    Patient Name: Chucho Clark  MRN: [id number]  Admission Date: 4/2/2024  Hospital Length of Stay: 5 days  Code Status: Full Code   Attending Physician: Wilder Friedman MD   Primary Care Physician: Ale, Primary Doctor  Expected Discharge Date:   Principal Problem:Acute on chronic congestive heart failure    Subjective:   HPI:  78 y.o. male patient, PMHx: Diastolic HF, HTN, DVT, CAD. Presented to the Emergency Department for evaluation of CP which onset night PTA. Pt visited Dr. Corrales (Cardiology) for the first time on 4/1/24 and was initiated on Eliquis. Pt notes that he also felt some upper abd pain PTA that felt like acid reflux. Pt and son originally thought sxs were cold/flu sxs, but tested negative in the clinic. Symptoms are constant and moderate in severity. No mitigating or exacerbating factors reported. Associated sxs include SOB, palpitations, and increased HR. Patient denies any cough, numbness, HA, fever, n/v/d, and all other sxs at this time. Pt has not had a stress test. In the ED, vitals: 130/69, 121, 22, 97.8F, 95% RA. Significant Labs: BNP: 560, Trop: 0.064, Bili: 1.1. CXR: interstitial edema. EKG: Neg for STEMI. Cardiology consulted in ED. Treated with ASA, Nitro, BB, diuretic. Initiated on Heparin Infusion. Patient is a full code. Placed in observation under the care of Hospital Medicine for management of elevated troponin, ACS rule out.     Hospital Course:   4/3/24-Patient seen and examined today, resting in bed. Feeling better. Less SOB, diuresed well overnight. Still has significant BLE edema. Labs reviewed/stable. EKG with aflutter. TTE with EF of 20-25%, decreased RV function, severe MR. Troponin flat. Discussed ischemic workup possibly tmw pending clinical condition.     4.4.2024  Still in aflutter, swelling significantly improved   In bed, laying comfortably    4/5/24-Patient seen and examined today, s/p R/LHC yesterday which  showed severe triple vessel disease. Underwent RODERICK/DCCV with restoration of SR. Feels ok. Does have some increase in SOB/orthopnea. No CP symptoms. Remains in SR. Reviewed case with patient/CTS, will proceed with repeat LHC today and PCI. LifeVest ordered for SCD prevention.    4.7.2024  Still in aflutter  Discussed with son and patient , sister   Agreeable with dccv    4/8/24-Patient seen and examined today, resting in bed. Family at bedside. Feels ok. SOB stable. No CP. Remains in SR. IABP in place. Labs reviewed. Na 132, creatinine 1.1. Repeat LHC with RCA intervention planned today by Dr. Mayers.        Review of Systems   Constitutional: Positive for malaise/fatigue.   HENT: Negative.     Eyes: Negative.    Cardiovascular: Negative.    Respiratory: Negative.     Endocrine: Negative.    Hematologic/Lymphatic: Negative.    Skin: Negative.    Musculoskeletal:  Positive for arthritis and joint pain.   Gastrointestinal: Negative.    Genitourinary: Negative.    Neurological: Negative.    Psychiatric/Behavioral: Negative.     Allergic/Immunologic: Negative.      Objective:     Vital Signs (Most Recent):  Temp: 97.5 °F (36.4 °C) (04/08/24 0701)  Pulse: (!) 56 (04/08/24 1000)  Resp: (!) 21 (04/08/24 1000)  BP: 120/75 (04/08/24 1000)  SpO2: 97 % (04/08/24 1000) Vital Signs (24h Range):  Temp:  [97.5 °F (36.4 °C)-98 °F (36.7 °C)] 97.5 °F (36.4 °C)  Pulse:  [56-70] 56  Resp:  [14-31] 21  SpO2:  [91 %-100 %] 97 %  BP: ()/(46-90) 120/75     Weight: 117.1 kg (258 lb 2.5 oz)  Body mass index is 36.01 kg/m².     SpO2: 97 %         Intake/Output Summary (Last 24 hours) at 4/8/2024 1013  Last data filed at 4/8/2024 0900  Gross per 24 hour   Intake 936.09 ml   Output 1545 ml   Net -608.91 ml       Lines/Drains/Airways       Peripherally Inserted Central Catheter Line  Duration             PICC Double Lumen 04/06/24 1325 right basilic 1 day              Drain  Duration                  Urethral Catheter 04/05/24 1600 2 days               Line  Duration                  IABP 04/05/24 1546 2 days              Peripheral Intravenous Line  Duration                  Peripheral IV - Single Lumen 04/05/24 1609 20 G Anterior;Left Forearm 2 days                       Physical Exam  Vitals and nursing note reviewed.   Constitutional:       General: He is not in acute distress.     Appearance: He is well-developed. He is not diaphoretic.      Comments: On supplemental O2 via NC   HENT:      Head: Normocephalic and atraumatic.   Eyes:      General:         Right eye: No discharge.         Left eye: No discharge.      Pupils: Pupils are equal, round, and reactive to light.   Neck:      Thyroid: No thyromegaly.      Vascular: No JVD.      Trachea: No tracheal deviation.   Cardiovascular:      Rate and Rhythm: Regular rhythm. Bradycardia present.      Heart sounds: S1 normal and S2 normal. No murmur heard.  Pulmonary:      Effort: No respiratory distress.      Comments: Slightly diminished at bases  Abdominal:      General: There is no distension.   Musculoskeletal:      Cervical back: Neck supple.      Right lower leg: Edema present.      Left lower leg: Edema present.   Skin:     General: Skin is warm and dry.      Findings: No erythema.      Comments: IABP in place R groin   Neurological:      General: No focal deficit present.      Mental Status: He is alert and oriented to person, place, and time.   Psychiatric:         Mood and Affect: Mood normal.         Behavior: Behavior normal.            Significant Labs: CMP   Recent Labs   Lab 04/07/24  0400 04/07/24  2220 04/08/24  0503   * 133* 132*   K 3.8 3.8 4.1   CL 97 95 95   CO2 31* 29 31*   GLU 96 102 101   BUN 21 21 22   CREATININE 1.1 1.0 1.1   CALCIUM 8.5* 8.8 8.7   ANIONGAP 7* 9 6*    and CBC   Recent Labs   Lab 04/07/24  0400 04/08/24  0503   WBC 4.57  4.57 5.13   HGB 12.8*  12.8* 12.4*   HCT 39.0*  39.0* 37.7*     155 153       Significant Imaging: Echocardiogram:  Transthoracic echo (TTE) complete ([facility] Only):   Results for orders placed or performed during the hospital encounter of 04/02/24   Echo   Result Value Ref Range    BSA 2.55 m2    LVOT stroke volume 61.14 cm3    LVIDd 5.50 3.5 - 6.0 cm    LV Systolic Volume 93.48 mL    LV Systolic Volume Index 38.0 mL/m2    LVIDs 4.52 (A) 2.1 - 4.0 cm    LV Diastolic Volume 147.39 mL    LV Diastolic Volume Index 59.91 mL/m2    IVS 1.70 (A) 0.6 - 1.1 cm    LVOT diameter 2.03 cm    LVOT area 3.2 cm2    FS 18 (A) 28 - 44 %    Left Ventricle Relative Wall Thickness 0.49 cm    Posterior Wall 1.35 (A) 0.6 - 1.1 cm    LV mass 382.20 g    LV Mass Index 155 g/m2    MV Peak E Guru 1.29 m/s    MV Peak A Guru 0.49 m/s    TR Max Guru 3.07 m/s    E/A ratio 2.63     E wave deceleration time 154.04 msec    LVOT peak guru 1.01 m/s    Left Ventricular Outflow Tract Mean Velocity 0.94 cm/s    Left Ventricular Outflow Tract Mean Gradient 3.53 mmHg    TAPSE 2.25 cm    LA size 5.05 cm    Left Atrium Minor Axis 7.53 cm    Left Atrium Major Axis 7.28 cm    RA Major Axis 5.96 cm    AV regurgitation pressure 1/2 time 709.08083159927699 ms    AR Max Guru 2.83 m/s    AV mean gradient 8 mmHg    AV peak gradient 13 mmHg    Ao peak guru 1.81 m/s    Ao VTI 33.00 cm    LVOT peak VTI 18.90 cm    AV valve area 1.85 cm²    AV Velocity Ratio 0.56     AV index (prosthetic) 0.57     ZENIA by Velocity Ratio 1.81 cm²    Mr max guru 4.90 m/s    MV mean gradient 4 mmHg    MV peak gradient 12 mmHg    MV stenosis pressure 1/2 time 44.67 ms    MV valve area p 1/2 method 4.93 cm2    MV valve area by continuity eq 1.66 cm2    MV VTI 36.9 cm    TV mean gradient 33 mmHg    Triscuspid Valve Regurgitation Peak Gradient 38 mmHg    Ao root annulus 3.51 cm    STJ 3.32 cm    Ascending aorta 3.26 cm    IVC diameter 2.72 cm    ZLVIDS -4.00     ZLVIDD -8.28     LA Volume Index 69.8 mL/m2    LA volume 171.60 cm3    LA WIDTH 5.4 cm    RA Width 3.8 cm    TV resting pulmonary artery pressure 41 mmHg     RV TB RVSP 6 mmHg    Est. RA pres 3 mmHg    Narrative      Left Ventricle: The left ventricle is severely dilated. Mildly   increased wall thickness. There is moderately reduced systolic function   with a visually estimated ejection fraction of 30 - 35%. There is   diastolic dysfunction.    Right Ventricle: Normal right ventricular cavity size. Wall thickness   is normal. Right ventricle wall motion  is normal. Systolic function is   normal.    Aortic Valve: There is mild aortic regurgitation.    Mitral Valve: There is severe regurgitation.    Pulmonary Artery: The estimated pulmonary artery systolic pressure is   41 mmHg.    IVC/SVC: Normal venous pressure at 3 mmHg.      and EKG: Reviewed  Assessment and Plan:   Patient who presents with acute combined CHF/ICM/MR/PAFL. Previously underwent LCX and LAD intervention, required IABP for support post PCI given flash pulmonary edema. Remains in SR post most recent DCCV. IABP in place. Repeat C with intervention of RCA planned today by Dr. Mayers.     * Acute on chronic congestive heart failure  Patient is identified as having Diastolic (HFpEF) heart failure that is Acute. CHF is currently uncontrolled due to Pulmonary edema/pleural effusion on CXR. Latest ECHO performed and demonstrates- No results found for this or any previous visit.  . Continue Furosemide and monitor clinical status closely. Monitor on telemetry. Patient is on CHF pathway.  Monitor strict Is&Os and daily weights.  Place on fluid restriction of 2 L. Cardiology has been consulted. Continue to stress to patient importance of self efficacy and  on diet for CHF. Last BNP reviewed- and noted below   Recent Labs   Lab 04/02/24  0817   *     Cw iv lasix     4/3/24  -TTE with EF of 20-25%, moderately reduced RV function, pulmonary HTN, severe MR  -Continue IV diuresis  -Continue BB  -Will add ARB vs Entresto pending creatinine/BP trend  -Strict I's/O's  -Probable R/LHC tmw pending  reassessment    4.4.2024  Right and left heart catheterization today.    Lasix held due to drop in pressure yesterday evening.    Discussed risks and benefits with the family, son and daughter at bedside.    4/5/24  -More SOB this AM with cough/orthopnea  -IV Lasix x one dose  -Continue Aldactone, BB    4.6.2024  Continue with balloon pump for today.    We will get a central line.  Discussed with ICU staff.  Check CVP and mixed venous.    Lactic acid normal.    Continue with IV diuresis.     4/8/24  -IABP remains in place  -Continue IV diuresis for now  -Continue BB  -Will optimize regimen further post procedure       On intra-aortic balloon pump assist  Continue today.    Placed yesterday after flash pulmonary edema in recovery post PCI.     4/8/24  -IABP remains in place, repeat [facility] with PCI of RCA planned today      Left bundle branch block  We will have him evaluated by EP for possible CRT.        BMI 39.0-39.9,adult  -Weight loss    Atrial flutter  -Currently in aflutter HR low 100's  -Contributing to volume status/cardiomyopathy  -Continue BB  -Start amiodarone drip  -Continue heparin drip  -Will need RODERICK/DCCV post ischemic workup as well as EP evaluation to discuss ablation in future    4.4.2024  RODERICK cardioversion today after heart catheterization.    4/5/24  -Remains in SR s/p RODERICK/DCCV  -Amiodarone switched to po  -Continue BB  -Heparin gtt for now, will need Eliquis upon d/c    4.6.2024  BACK IN A FLUTTER THIS MORNING.  We will resume IV amiodarone.  Continue with p.o..    Add digoxin IV.    Continue with beta-blockers.    Continue with balloon pump for now.    He may need a repeat cardioversion if does not convert back to normal with medications.    Future plan for ablation as well.      4.7.2024  Dccv today   Family and patient agreeable     4/8/24  -Remains in SR s/p DCCV yesterday  -Continue amiodarone, BB, digoxin for now  -Continue heparin gtt, will need OAC prior to d/c      Orthopnea  -IV Lasix x  one dose    Chest pain  Heparin iv   Trend trop   Echo   Aspirin   Will need ischemic work up prior to discharge  Cw toprol    4/3/24  -Stable, troponin flat  -Continue ASA, BB, statin, heparin gtt  -Probable LHC tmw pending reassessment of volume status    4/5/24  -Multivessel CAD on cath    Elevated troponin  -Flat  -Likely secondary to demand ischemia from new onset atrial flutter and combined CHF  -Continue ASA, BB, heparin gtt, statin  -Ischemic workup with LHC once better compensated    4/5/24  -s/p LHC which showed triple vessel disease  -Case discussed with CTS and patient/family, repeat LHC today with PCI. Dr. Hernandez explained all risks, benefits, and treatment alternatives. All questions answered. Patient agreeable with proceeding.     4/8/24  -See plan under CAD    Essential hypertension  -Continue BB  -Monitor BP trend    Coronary artery disease involving native coronary artery of native heart  04/06/2024.    Status post PCI to LAD and circumflex yesterday.    He will eventually need at this point PCI of his RCA  to help improve his mitral valve function    4/8/24  -Stable, CP free  -IABP remains in place  -Repeat [facility] with RCA intervention today by Dr. Mayers  -Continue ASA, statin, Plavix, heparin gtt, Imdur, BB        VTE Risk Mitigation (From admission, onward)           Ordered     heparin 25,000 units in dextrose 5% (100 units/ml) IV bolus from bag LOW INTENSITY nomogram - [facility]  As needed (PRN)        Question:  Heparin Infusion Adjustment (DO NOT MODIFY ANSWER)  Answer:  [email or web address]\Images\Pharmacy\HeparinInfusions\heparin LOW INTENSITY nomogram for [facility] [id number]    04/05/24 1558     heparin 25,000 units in dextrose 5% (100 units/ml) IV bolus from bag LOW INTENSITY nomogram - [facility]  As needed (PRN)        Question:  Heparin Infusion Adjustment (DO NOT MODIFY ANSWER)  Answer:  [email or web address]\Images\Pharmacy\HeparinInfusions\heparin LOW INTENSITY nomogram for [facility] [id number]    04/05/24  1558     heparin 25,000 units in dextrose 5% (100 units/ml) IV bolus from bag LOW INTENSITY nomogram - [facility]  Once        Question:  Heparin Infusion Adjustment (DO NOT MODIFY ANSWER)  Answer:  [email or web address]\Images\Pharmacy\HeparinInfusions\heparin LOW INTENSITY nomogram for [facility] [id number]    04/05/24 1558     heparin 25,000 units in dextrose 5% 250 mL (100 units/mL) infusion LOW INTENSITY nomogram - [facility]  Continuous        Question:  Begin at (units/kg/hr)  Answer:  12    04/05/24 1558     Reason for No Pharmacological VTE Prophylaxis  Once        Comments: Heparin Infusion   Question:  Reasons:  Answer:  Physician Provided (leave comment)    04/02/24 1153     IP VTE HIGH RISK PATIENT  Once         04/02/24 1153     Place sequential compression device  Until discontinued         04/02/24 1153                    Roxi De Leon PA-C  Cardiology  [facility] ([facility])

## 2024-04-08 NOTE — PLAN OF CARE
Patient remains stable for duration of shift, no acute events. AAOx4, VSS. SB-NSR on monitor. IABP assessed Q1H (see IABP Flowsheet). POC reviewed with patient and family. Currently resting in bed with safety and fall precautions in place, call light in reach. Care ongoing.

## 2024-04-08 NOTE — ASSESSMENT & PLAN NOTE
Patient is identified as having Diastolic (HFpEF) heart failure that is Acute. CHF is currently uncontrolled due to Pulmonary edema/pleural effusion on CXR. Latest ECHO performed and demonstrates- No results found for this or any previous visit.  . Continue Furosemide and monitor clinical status closely. Monitor on telemetry. Patient is on CHF pathway.  Monitor strict Is&Os and daily weights.  Place on fluid restriction of 2 L. Cardiology has been consulted. Continue to stress to patient importance of self efficacy and  on diet for CHF. Last BNP reviewed- and noted below   Recent Labs   Lab 04/02/24  0817   *     Cw iv lasix     4/3/24  -TTE with EF of 20-25%, moderately reduced RV function, pulmonary HTN, severe MR  -Continue IV diuresis  -Continue BB  -Will add ARB vs Entresto pending creatinine/BP trend  -Strict I's/O's  -Probable R/LHC tmw pending reassessment    4.4.2024  Right and left heart catheterization today.    Lasix held due to drop in pressure yesterday evening.    Discussed risks and benefits with the family, son and daughter at bedside.    4/5/24  -More SOB this AM with cough/orthopnea  -IV Lasix x one dose  -Continue Aldactone, BB    4.6.2024  Continue with balloon pump for today.    We will get a central line.  Discussed with ICU staff.  Check CVP and mixed venous.    Lactic acid normal.    Continue with IV diuresis.     4/8/24  -IABP remains in place  -Continue IV diuresis for now  -Continue BB  -Will optimize regimen further post procedure

## 2024-04-08 NOTE — ASSESSMENT & PLAN NOTE
- History of combined HF (EF 30-35% with diastolic heart failure, severe mitral regurg and a pulmonary artery pressure 41)  - Successful diuresis this admit and is currently 11.5L negative   - After heart catheterization on 04/05 patient had an episode of flash pulmonary edema with required BiPAP and diuresis, now on 2L NC, continue to wean as able  - Cards managing  - ASA, statin, plavix, BB, nitrate, lasix  - Possible removal of IABP today in repeat cath

## 2024-04-08 NOTE — NURSING
Left groin site assessed at 1720. Site hard with a small amount of sanguineous drainage. MD notified. Pressure held manually for 5 minutes. MD arrived at bedside, assessed, and manually pressure continued. Femostop obtained and applied at 88 mm Hg. Care ongoing.

## 2024-04-08 NOTE — ASSESSMENT & PLAN NOTE
- Mgmt per cards  - Currently on BB, nitrate, lasix  - Trend hemodynamics and adjust meds as needed; BP is stable

## 2024-04-08 NOTE — ASSESSMENT & PLAN NOTE
-Currently in aflutter HR low 100's  -Contributing to volume status/cardiomyopathy  -Continue BB  -Start amiodarone drip  -Continue heparin drip  -Will need RODERICK/DCCV post ischemic workup as well as EP evaluation to discuss ablation in future    4.4.2024  RODERICK cardioversion today after heart catheterization.    4/5/24  -Remains in SR s/p RODERICK/DCCV  -Amiodarone switched to po  -Continue BB  -Heparin gtt for now, will need Eliquis upon d/c    4.6.2024  BACK IN A FLUTTER THIS MORNING.  We will resume IV amiodarone.  Continue with p.o..    Add digoxin IV.    Continue with beta-blockers.    Continue with balloon pump for now.    He may need a repeat cardioversion if does not convert back to normal with medications.    Future plan for ablation as well.      4.7.2024  Dccv today   Family and patient agreeable     4/8/24  -Remains in SR s/p DCCV yesterday  -Continue amiodarone, BB, digoxin for now  -Continue heparin gtt, will need OAC prior to d/c

## 2024-04-08 NOTE — ASSESSMENT & PLAN NOTE
- s/p RODERICK and DCCV 4/4, potential repeat DCCV yesterday per cards  - on amio and BB; Digoxin added  - Continue heparin gtt  - Patient NSR on monitor this morning; HR controlled

## 2024-04-08 NOTE — PLAN OF CARE
No acute events overnight. VSS. CVP ~8-13. IABP intact with no complications. Amio gtt stopped at 0600 per order. PRN treatment of pain and anxiety per order, see MAR. CHG bath given, linen changed. POC reviewed with patient. Safety and fall precautions maintained.

## 2024-04-08 NOTE — ASSESSMENT & PLAN NOTE
-Flat  -Likely secondary to demand ischemia from new onset atrial flutter and combined CHF  -Continue ASA, BB, heparin gtt, statin  -Ischemic workup with LHC once better compensated    4/5/24  -s/p LHC which showed triple vessel disease  -Case discussed with CTS and patient/family, repeat LHC today with PCI. Dr. Hernandez explained all risks, benefits, and treatment alternatives. All questions answered. Patient agreeable with proceeding.     4/8/24  -See plan under CAD

## 2024-04-08 NOTE — SUBJECTIVE & OBJECTIVE
Review of Systems   Constitutional: Positive for malaise/fatigue.   HENT: Negative.     Eyes: Negative.    Cardiovascular: Negative.    Respiratory: Negative.     Endocrine: Negative.    Hematologic/Lymphatic: Negative.    Skin: Negative.    Musculoskeletal:  Positive for arthritis and joint pain.   Gastrointestinal: Negative.    Genitourinary: Negative.    Neurological: Negative.    Psychiatric/Behavioral: Negative.     Allergic/Immunologic: Negative.      Objective:     Vital Signs (Most Recent):  Temp: 97.5 °F (36.4 °C) (04/08/24 0701)  Pulse: (!) 56 (04/08/24 1000)  Resp: (!) 21 (04/08/24 1000)  BP: 120/75 (04/08/24 1000)  SpO2: 97 % (04/08/24 1000) Vital Signs (24h Range):  Temp:  [97.5 °F (36.4 °C)-98 °F (36.7 °C)] 97.5 °F (36.4 °C)  Pulse:  [56-70] 56  Resp:  [14-31] 21  SpO2:  [91 %-100 %] 97 %  BP: ()/(46-90) 120/75     Weight: 117.1 kg (258 lb 2.5 oz)  Body mass index is 36.01 kg/m².     SpO2: 97 %         Intake/Output Summary (Last 24 hours) at 4/8/2024 1013  Last data filed at 4/8/2024 0900  Gross per 24 hour   Intake 936.09 ml   Output 1545 ml   Net -608.91 ml       Lines/Drains/Airways       Peripherally Inserted Central Catheter Line  Duration             PICC Double Lumen 04/06/24 1325 right basilic 1 day              Drain  Duration                  Urethral Catheter 04/05/24 1600 2 days              Line  Duration                  IABP 04/05/24 1546 2 days              Peripheral Intravenous Line  Duration                  Peripheral IV - Single Lumen 04/05/24 1609 20 G Anterior;Left Forearm 2 days                       Physical Exam  Vitals and nursing note reviewed.   Constitutional:       General: He is not in acute distress.     Appearance: He is well-developed. He is not diaphoretic.      Comments: On supplemental O2 via NC   HENT:      Head: Normocephalic and atraumatic.   Eyes:      General:         Right eye: No discharge.         Left eye: No discharge.      Pupils: Pupils are  equal, round, and reactive to light.   Neck:      Thyroid: No thyromegaly.      Vascular: No JVD.      Trachea: No tracheal deviation.   Cardiovascular:      Rate and Rhythm: Regular rhythm. Bradycardia present.      Heart sounds: S1 normal and S2 normal. No murmur heard.  Pulmonary:      Effort: No respiratory distress.      Comments: Slightly diminished at bases  Abdominal:      General: There is no distension.   Musculoskeletal:      Cervical back: Neck supple.      Right lower leg: Edema present.      Left lower leg: Edema present.   Skin:     General: Skin is warm and dry.      Findings: No erythema.      Comments: IABP in place R groin   Neurological:      General: No focal deficit present.      Mental Status: He is alert and oriented to person, place, and time.   Psychiatric:         Mood and Affect: Mood normal.         Behavior: Behavior normal.            Significant Labs: CMP   Recent Labs   Lab 04/07/24  0400 04/07/24  2220 04/08/24  0503   * 133* 132*   K 3.8 3.8 4.1   CL 97 95 95   CO2 31* 29 31*   GLU 96 102 101   BUN 21 21 22   CREATININE 1.1 1.0 1.1   CALCIUM 8.5* 8.8 8.7   ANIONGAP 7* 9 6*    and CBC   Recent Labs   Lab 04/07/24  0400 04/08/24  0503   WBC 4.57  4.57 5.13   HGB 12.8*  12.8* 12.4*   HCT 39.0*  39.0* 37.7*     155 153       Significant Imaging: Echocardiogram: Transthoracic echo (TTE) complete (Cupid Only):   Results for orders placed or performed during the hospital encounter of 04/02/24   Echo   Result Value Ref Range    BSA 2.55 m2    LVOT stroke volume 61.14 cm3    LVIDd 5.50 3.5 - 6.0 cm    LV Systolic Volume 93.48 mL    LV Systolic Volume Index 38.0 mL/m2    LVIDs 4.52 (A) 2.1 - 4.0 cm    LV Diastolic Volume 147.39 mL    LV Diastolic Volume Index 59.91 mL/m2    IVS 1.70 (A) 0.6 - 1.1 cm    LVOT diameter 2.03 cm    LVOT area 3.2 cm2    FS 18 (A) 28 - 44 %    Left Ventricle Relative Wall Thickness 0.49 cm    Posterior Wall 1.35 (A) 0.6 - 1.1 cm    LV mass 382.20 g     LV Mass Index 155 g/m2    MV Peak E Guru 1.29 m/s    MV Peak A Guru 0.49 m/s    TR Max Guru 3.07 m/s    E/A ratio 2.63     E wave deceleration time 154.04 msec    LVOT peak guru 1.01 m/s    Left Ventricular Outflow Tract Mean Velocity 0.94 cm/s    Left Ventricular Outflow Tract Mean Gradient 3.53 mmHg    TAPSE 2.25 cm    LA size 5.05 cm    Left Atrium Minor Axis 7.53 cm    Left Atrium Major Axis 7.28 cm    RA Major Axis 5.96 cm    AV regurgitation pressure 1/2 time 709.96503707818021 ms    AR Max Guru 2.83 m/s    AV mean gradient 8 mmHg    AV peak gradient 13 mmHg    Ao peak guru 1.81 m/s    Ao VTI 33.00 cm    LVOT peak VTI 18.90 cm    AV valve area 1.85 cm²    AV Velocity Ratio 0.56     AV index (prosthetic) 0.57     ZENIA by Velocity Ratio 1.81 cm²    Mr max guru 4.90 m/s    MV mean gradient 4 mmHg    MV peak gradient 12 mmHg    MV stenosis pressure 1/2 time 44.67 ms    MV valve area p 1/2 method 4.93 cm2    MV valve area by continuity eq 1.66 cm2    MV VTI 36.9 cm    TV mean gradient 33 mmHg    Triscuspid Valve Regurgitation Peak Gradient 38 mmHg    Ao root annulus 3.51 cm    STJ 3.32 cm    Ascending aorta 3.26 cm    IVC diameter 2.72 cm    ZLVIDS -4.00     ZLVIDD -8.28     LA Volume Index 69.8 mL/m2    LA volume 171.60 cm3    LA WIDTH 5.4 cm    RA Width 3.8 cm    TV resting pulmonary artery pressure 41 mmHg    RV TB RVSP 6 mmHg    Est. RA pres 3 mmHg    Narrative      Left Ventricle: The left ventricle is severely dilated. Mildly   increased wall thickness. There is moderately reduced systolic function   with a visually estimated ejection fraction of 30 - 35%. There is   diastolic dysfunction.    Right Ventricle: Normal right ventricular cavity size. Wall thickness   is normal. Right ventricle wall motion  is normal. Systolic function is   normal.    Aortic Valve: There is mild aortic regurgitation.    Mitral Valve: There is severe regurgitation.    Pulmonary Artery: The estimated pulmonary artery systolic pressure is    41 mmHg.    IVC/SVC: Normal venous pressure at 3 mmHg.      and EKG: Reviewed

## 2024-04-08 NOTE — ASSESSMENT & PLAN NOTE
- s/p RODERICK with DCCV and R/LHC 4/4 that revealed severe 3 vessel disease, discussions were had with CT surgery and Life Vest arranged  - s/p repeat LHC 4/5 with PCI to mid left circ x1 and mid LAD x1, placement of balloon pump  - Plavix, ASA, statin, BB  - Mgmt per cards  - Plan for repeat cath today for RCA intervention and removal of IABP

## 2024-04-08 NOTE — INTERVAL H&P NOTE
The patient has been examined and the H&P has been reviewed:    I concur with the findings and no changes have occurred since H&P was written.    Procedure risks, benefits and alternative options discussed and understood by patient/family.          Active Hospital Problems    Diagnosis  POA    *Acute on chronic congestive heart failure [I50.9]  Yes    Left bundle branch block [I44.7]  Yes    On intra-aortic balloon pump assist [Z98.890]  Not Applicable    BMI 39.0-39.9,adult [Z68.39]  Not Applicable    Atrial flutter [I48.92]  Yes    Elevated troponin [R79.89]  Yes    Chest pain [R07.9]  Yes    Orthopnea [R06.01]  Yes    Coronary artery disease involving native coronary artery of native heart [I25.10]  Yes    Essential hypertension [I10]  Yes    Venous stasis dermatitis of both lower extremities [I87.2]  Yes      Resolved Hospital Problems   No resolved problems to display.

## 2024-04-08 NOTE — ASSESSMENT & PLAN NOTE
04/06/2024.    Status post PCI to LAD and circumflex yesterday.    He will eventually need at this point PCI of his RCA  to help improve his mitral valve function    4/8/24  -Stable, CP free  -IABP remains in place  -Repeat [facility] with RCA intervention today by Dr. Mayers  -Continue ASA, statin, Plavix, heparin gtt, Imdur, BB

## 2024-04-08 NOTE — ASSESSMENT & PLAN NOTE
Continue today.    Placed yesterday after flash pulmonary edema in recovery post PCI.     4/8/24  -IABP remains in place, repeat C with PCI of RCA planned today

## 2024-04-08 NOTE — BRIEF OP NOTE
INPATIENT Operative Note         SUMMARY     Surgery Date: 4/8/2024     Surgeon(s) and Role:     * Shan Mayers MD - Primary    ASSISTANT:none    Pre-op Diagnosis:  Coronary artery disease involving native heart with refractory angina pectoris, unspecified vessel or lesion type [I25.112]  Systolic and diastolic CHF, acute on chronic [I50.43]  Atrial flutter, unspecified type [I48.92]      Post-op Diagnosis:  Coronary artery disease involving native heart with refractory angina pectoris, unspecified vessel or lesion type [I25.112]  Systolic and diastolic CHF, acute on chronic [I50.43]  Atrial flutter, unspecified type [I48.92]    Procedure(s) (LRB):  Percutaneous coronary intervention- RCA/  (N/A)  Repair, Chronic Total Occlusion, Coronary  Stent, Drug Eluting, Multi Vessel, Coronary  Thrombectomy, Coronary    COMPLICATION:none    Anesthesia: RN IV Sedation    Findings/Key Components:   rca treated with 3 stents.  Pda subtotal treated with stent     Estimated Blood Loss: < 50 ML.         SPECIMEN: NONE    Devices/Prostetics: stent x4 synergy xd     PLAN: routine post stent care   Iabp to be removed in am.

## 2024-04-08 NOTE — PROGRESS NOTES
[facility] - Intensive Care ([facility])  Critical Care Medicine  Progress Note    Patient Name: Chucho Clark  MRN: [id number]  Admission Date: 4/2/2024  Hospital Length of Stay: 5 days  Code Status: Full Code  Attending Provider: Wilder Friedman MD  Primary Care Provider: Ale, Primary Doctor   Principal Problem: Acute on chronic congestive heart failure    Subjective:     HPI:  78-year-old male with a known past medical history of combined heart failure (EF 20-25% and grade II diastolic failure), hypertension, hyperlipidemia, DVT, CAD, and obesity that presented to the ER 4/2 for evaluation of chest pain that began the night prior to arrival.  Of note, patient had a first-time visit with Cardiology (Dr. Corrales) on 04/1 and was initiated on Eliquis for a flutter and was stated on metoprolol. In the ED, vitals: 130/69, 121, 22, 97.8F, 95% RA. Significant Labs: BNP: 560, Trop: 0.064, Bili: 1.1. CXR: interstitial edema. EKG: Neg for STEMI. Cardiology consulted in ED. Treated with ASA, Nitro, BB, diuretic. Initiated on Heparin Infusion. Placed was observation under the care of Hospital Medicine for management of elevated troponin, ACS rule out.  During hospital stay patient underwent diuresis and is-6.5 L as of 4/5.  On 04/04 patient was taken to the cath lab and underwent RODERICK with DCCV as well as a left-to-right heart catheterization which revealed severe three-vessel disease and a consultation by CT surgery was recommended; per documentation it appears discussions were had an incision was made to take the patient back to the cath lab on 04/05 were underwent another C with PCI to the mid left circ x1 in the mid LAD x1.  In the PACU patient had an episode of flash pulmonary edema for which he required NIPPV as well as diuretics.  Balloon pump was also placed prior to transfer to the ICU. It also appears that life vest is being arranged for discharge.     Hospital/ICU Course:  4/6: No acute events overnight, pt awake and  alert, on 2L NC, denies CP or SOB or any other issues, just wants to be able to move his right leg  4/7: No acute events, a fib on monitor with controlled rate, remains with IABP in place, on NC in NAD  4/8: Denies any chst pain/SOB. Complains of irritability to R thigh and difficulty getting in comfortable position. Plan for cath lab again today with Dr. Mayers.     Objective:     Vital Signs (Most Recent):  Temp: 97.5 °F (36.4 °C) (04/08/24 0701)  Pulse: (!) 56 (04/08/24 1000)  Resp: (!) 21 (04/08/24 1000)  BP: 120/75 (04/08/24 1000)  SpO2: 97 % (04/08/24 1000) Vital Signs (24h Range):  Temp:  [97.5 °F (36.4 °C)-98 °F (36.7 °C)] 97.5 °F (36.4 °C)  Pulse:  [56-70] 56  Resp:  [14-31] 21  SpO2:  [91 %-100 %] 97 %  BP: ()/(46-90) 120/75     Weight: 117.1 kg (258 lb 2.5 oz)  Body mass index is 36.01 kg/m².  Intake/Output Summary (Last 24 hours) at 4/8/2024 1054  Last data filed at 4/8/2024 1000  Gross per 24 hour   Intake 946.78 ml   Output 1670 ml   Net -723.22 ml     Physical Exam  Constitutional:       General: He is not in acute distress.     Appearance: He is obese. He is ill-appearing.   HENT:      Head: Normocephalic.   Eyes:      General: No scleral icterus.     Conjunctiva/sclera: Conjunctivae normal.   Cardiovascular:      Rate and Rhythm: Normal rate and regular rhythm.      Pulses:           Dorsalis pedis pulses are 1+ on the right side and 1+ on the left side.      Comments: NSR on monitor   Pulmonary:      Effort: Pulmonary effort is normal. No respiratory distress.      Breath sounds: No wheezing.   Abdominal:      Palpations: Abdomen is soft.      Tenderness: There is no abdominal tenderness.   Musculoskeletal:      Right lower leg: Edema present.      Left lower leg: Edema present.      Comments: IABP site to R thigh  RLE in immobilizer    Skin:     General: Skin is warm.      Capillary Refill: Capillary refill takes 2 to 3 seconds.   Neurological:      Mental Status: He is alert and oriented to  person, place, and time.   Psychiatric:         Mood and Affect: Mood normal.         Behavior: Behavior normal.         Thought Content: Thought content normal.         Judgment: Judgment normal.      Review of Systems   Constitutional:  Negative for fatigue and fever.   Respiratory:  Negative for chest tightness and shortness of breath.    Cardiovascular:  Negative for chest pain and palpitations.   Gastrointestinal:  Negative for abdominal pain, nausea and vomiting.   Genitourinary:  Negative for difficulty urinating and dysuria.   Musculoskeletal:         Positive for pain in R thigh   Neurological:  Positive for weakness. Negative for headaches.   Psychiatric/Behavioral:  Negative for agitation and confusion.      Vents:  Oxygen Concentration (%): 28 (04/08/24 0711)    Lines/Drains/Airways       Peripherally Inserted Central Catheter Line  Duration             PICC Double Lumen 04/06/24 1325 right basilic 1 day              Drain  Duration                  Urethral Catheter 04/05/24 1600 2 days              Line  Duration                  IABP 04/05/24 1546 2 days              Peripheral Intravenous Line  Duration                  Peripheral IV - Single Lumen 04/05/24 1609 20 G Anterior;Left Forearm 2 days                  Significant Labs:    CBC/Anemia Profile:  Recent Labs   Lab 04/07/24  0400 04/08/24  0503   WBC 4.57  4.57 5.13   HGB 12.8*  12.8* 12.4*   HCT 39.0*  39.0* 37.7*     155 153   MCV 98  98 96   RDW 13.2  13.2 13.2     Chemistries:  Recent Labs   Lab 04/07/24  0400 04/07/24  2220 04/08/24  0503   * 133* 132*   K 3.8 3.8 4.1   CL 97 95 95   CO2 31* 29 31*   BUN 21 21 22   CREATININE 1.1 1.0 1.1   CALCIUM 8.5* 8.8 8.7   MG 1.9  --  2.1   PHOS  --   --  3.5     Significant Imaging:  No new imaging in past 24 hours    ABG  Recent Labs   Lab 04/07/24  2214   PH 7.423   PO2 32*   PCO2 52.1*   HCO3 34.1*   BE 10*     Assessment/Plan:     Cardiac/Vascular  * Acute on chronic  congestive heart failure  - History of combined HF (EF 30-35% with diastolic heart failure, severe mitral regurg and a pulmonary artery pressure 41)  - Successful diuresis this admit and is currently 11.5L negative   - After heart catheterization on 04/05 patient had an episode of flash pulmonary edema with required BiPAP and diuresis, now on 2L NC, continue to wean as able  - Cards managing  - ASA, statin, plavix, BB, nitrate, lasix  - Possible removal of IABP today in repeat cath    Atrial flutter  - s/p RODERICK and DCCV 4/4, potential repeat DCCV yesterday per cards  - on amio and BB; Digoxin added  - Continue heparin gtt  - Patient NSR on monitor this morning; HR controlled    Chest pain  - Improved; see plan for CHF/CAD    Venous stasis dermatitis of both lower extremities  - Diuresis, elevate extremities, supportive care as outlined elsewhere     Essential hypertension  - Mgmt per cards  - Currently on BB, nitrate, lasix  - Trend hemodynamics and adjust meds as needed; BP is stable     Coronary artery disease involving native coronary artery of native heart  - s/p RODERICK with DCCV and R/LHC 4/4 that revealed severe 3 vessel disease, discussions were had with CT surgery and Life Vest arranged  - s/p repeat LHC 4/5 with PCI to mid left circ x1 and mid LAD x1, placement of balloon pump  - Plavix, ASA, statin, BB  - Mgmt per cards  - Plan for repeat cath today for RCA intervention and removal of IABP    Endocrine  BMI 39.0-39.9,adult  - Affecting medical decision making and complicating the above   - Pt would benefit greatly from weight loss and lifestyle modifications      Critical Care Time: 35 minutes  Critical secondary to Patient has a condition that poses threat to life and bodily function: Acute CHF, IABP     Critical care was time spent personally by me on the following activities: development of treatment plan with patient or surrogate and bedside caregivers, discussions with consultants, evaluation of patient's  response to treatment, examination of patient, ordering and performing treatments and interventions, ordering and review of laboratory studies, ordering and review of radiographic studies, pulse oximetry, re-evaluation of patient's condition. This critical care time did not overlap with that of any other provider or involve time for any procedures.     Clifford Lo PA-C  Critical Care Medicine  [facility] - Intensive Care ([facility])

## 2024-04-09 LAB
ANION GAP SERPL CALC-SCNC: 8 MMOL/L (ref 8–16)
APTT PPP: 25.4 SEC (ref 21–32)
BASOPHILS # BLD AUTO: 0.01 K/UL (ref 0–0.2)
BASOPHILS NFR BLD: 0.2 % (ref 0–1.9)
BNP SERPL-MCNC: 229 PG/ML (ref 0–99)
BUN SERPL-MCNC: 23 MG/DL (ref 8–23)
CALCIUM SERPL-MCNC: 8.4 MG/DL (ref 8.7–10.5)
CHLORIDE SERPL-SCNC: 96 MMOL/L (ref 95–110)
CO2 SERPL-SCNC: 30 MMOL/L (ref 23–29)
CREAT SERPL-MCNC: 1 MG/DL (ref 0.5–1.4)
DIFFERENTIAL METHOD BLD: ABNORMAL
EOSINOPHIL # BLD AUTO: 0.1 K/UL (ref 0–0.5)
EOSINOPHIL NFR BLD: 1.7 % (ref 0–8)
ERYTHROCYTE [DISTWIDTH] IN BLOOD BY AUTOMATED COUNT: 13.2 % (ref 11.5–14.5)
EST. GFR  (NO RACE VARIABLE): >60 ML/MIN/1.73 M^2
GLUCOSE SERPL-MCNC: 93 MG/DL (ref 70–110)
HCT VFR BLD AUTO: 33.5 % (ref 40–54)
HGB BLD-MCNC: 11 G/DL (ref 14–18)
IMM GRANULOCYTES # BLD AUTO: 0.01 K/UL (ref 0–0.04)
IMM GRANULOCYTES NFR BLD AUTO: 0.2 % (ref 0–0.5)
LYMPHOCYTES # BLD AUTO: 0.6 K/UL (ref 1–4.8)
LYMPHOCYTES NFR BLD: 12.9 % (ref 18–48)
MAGNESIUM SERPL-MCNC: 2 MG/DL (ref 1.6–2.6)
MCH RBC QN AUTO: 31.8 PG (ref 27–31)
MCHC RBC AUTO-ENTMCNC: 32.8 G/DL (ref 32–36)
MCV RBC AUTO: 97 FL (ref 82–98)
MONOCYTES # BLD AUTO: 0.5 K/UL (ref 0.3–1)
MONOCYTES NFR BLD: 9.9 % (ref 4–15)
NEUTROPHILS # BLD AUTO: 3.5 K/UL (ref 1.8–7.7)
NEUTROPHILS NFR BLD: 75.1 % (ref 38–73)
NRBC BLD-RTO: 0 /100 WBC
PHOSPHATE SERPL-MCNC: 3.7 MG/DL (ref 2.7–4.5)
PLATELET # BLD AUTO: 141 K/UL (ref 150–450)
PMV BLD AUTO: 9.2 FL (ref 9.2–12.9)
POCT GLUCOSE: 104 MG/DL (ref 70–110)
POCT GLUCOSE: 91 MG/DL (ref 70–110)
POCT GLUCOSE: 95 MG/DL (ref 70–110)
POTASSIUM SERPL-SCNC: 4 MMOL/L (ref 3.5–5.1)
RBC # BLD AUTO: 3.46 M/UL (ref 4.6–6.2)
SODIUM SERPL-SCNC: 134 MMOL/L (ref 136–145)
WBC # BLD AUTO: 4.66 K/UL (ref 3.9–12.7)

## 2024-04-09 PROCEDURE — 63600175 PHARM REV CODE 636 W HCPCS: Performed by: INTERNAL MEDICINE

## 2024-04-09 PROCEDURE — 25000003 PHARM REV CODE 250: Performed by: INTERNAL MEDICINE

## 2024-04-09 PROCEDURE — 99233 SBSQ HOSP IP/OBS HIGH 50: CPT | Mod: ,,, | Performed by: INTERNAL MEDICINE

## 2024-04-09 PROCEDURE — 84100 ASSAY OF PHOSPHORUS: CPT | Performed by: INTERNAL MEDICINE

## 2024-04-09 PROCEDURE — 25000003 PHARM REV CODE 250: Performed by: PHYSICIAN ASSISTANT

## 2024-04-09 PROCEDURE — 83880 ASSAY OF NATRIURETIC PEPTIDE: CPT | Performed by: PHYSICIAN ASSISTANT

## 2024-04-09 PROCEDURE — 99900035 HC TECH TIME PER 15 MIN (STAT)

## 2024-04-09 PROCEDURE — 20000000 HC ICU ROOM

## 2024-04-09 PROCEDURE — 85025 COMPLETE CBC W/AUTO DIFF WBC: CPT | Performed by: INTERNAL MEDICINE

## 2024-04-09 PROCEDURE — 94761 N-INVAS EAR/PLS OXIMETRY MLT: CPT

## 2024-04-09 PROCEDURE — 85730 THROMBOPLASTIN TIME PARTIAL: CPT | Performed by: INTERNAL MEDICINE

## 2024-04-09 PROCEDURE — 83735 ASSAY OF MAGNESIUM: CPT | Performed by: INTERNAL MEDICINE

## 2024-04-09 PROCEDURE — 27000221 HC OXYGEN, UP TO 24 HOURS

## 2024-04-09 PROCEDURE — 80048 BASIC METABOLIC PNL TOTAL CA: CPT | Performed by: INTERNAL MEDICINE

## 2024-04-09 PROCEDURE — A4216 STERILE WATER/SALINE, 10 ML: HCPCS | Performed by: INTERNAL MEDICINE

## 2024-04-09 RX ADMIN — AMIODARONE HYDROCHLORIDE 200 MG: 200 TABLET ORAL at 08:04

## 2024-04-09 RX ADMIN — MUPIROCIN: 20 OINTMENT TOPICAL at 09:04

## 2024-04-09 RX ADMIN — HYDROCODONE BITARTRATE AND ACETAMINOPHEN 1 TABLET: 5; 325 TABLET ORAL at 02:04

## 2024-04-09 RX ADMIN — SODIUM CHLORIDE, PRESERVATIVE FREE 10 ML: 5 INJECTION INTRAVENOUS at 01:04

## 2024-04-09 RX ADMIN — MORPHINE SULFATE 2 MG: 4 INJECTION INTRAVENOUS at 07:04

## 2024-04-09 RX ADMIN — METOPROLOL TARTRATE 25 MG: 25 TABLET, FILM COATED ORAL at 08:04

## 2024-04-09 RX ADMIN — ISOSORBIDE MONONITRATE 30 MG: 30 TABLET, EXTENDED RELEASE ORAL at 08:04

## 2024-04-09 RX ADMIN — HYDROCODONE BITARTRATE AND ACETAMINOPHEN 1 TABLET: 5; 325 TABLET ORAL at 01:04

## 2024-04-09 RX ADMIN — FAMOTIDINE 20 MG: 20 TABLET ORAL at 08:04

## 2024-04-09 RX ADMIN — Medication 6 MG: at 09:04

## 2024-04-09 RX ADMIN — ATORVASTATIN CALCIUM 40 MG: 40 TABLET, FILM COATED ORAL at 08:04

## 2024-04-09 RX ADMIN — APIXABAN 5 MG: 2.5 TABLET, FILM COATED ORAL at 09:04

## 2024-04-09 RX ADMIN — FAMOTIDINE 20 MG: 20 TABLET ORAL at 09:04

## 2024-04-09 RX ADMIN — METOPROLOL TARTRATE 25 MG: 25 TABLET, FILM COATED ORAL at 09:04

## 2024-04-09 RX ADMIN — FUROSEMIDE 40 MG: 10 INJECTION, SOLUTION INTRAMUSCULAR; INTRAVENOUS at 08:04

## 2024-04-09 RX ADMIN — AMIODARONE HYDROCHLORIDE 200 MG: 200 TABLET ORAL at 09:04

## 2024-04-09 RX ADMIN — ASPIRIN 81 MG: 81 TABLET, COATED ORAL at 08:04

## 2024-04-09 RX ADMIN — CLOPIDOGREL BISULFATE 75 MG: 75 TABLET ORAL at 08:04

## 2024-04-09 NOTE — ASSESSMENT & PLAN NOTE
Continue today.    Placed yesterday after flash pulmonary edema in recovery post PCI.     4/8/24  -IABP remains in place, repeat [facility] with PCI of RCA planned today    4/9/24  -IABP d/c'd

## 2024-04-09 NOTE — ASSESSMENT & PLAN NOTE
"Patient is identified as having Diastolic (HFpEF) heart failure that is Acute. CHF is currently uncontrolled due to Pulmonary edema/pleural effusion on CXR. Latest ECHO performed and demonstrates- No results found for this or any previous visit.  . Continue Furosemide and monitor clinical status closely. Monitor on telemetry. Patient is on CHF pathway.  Monitor strict Is&Os and daily weights.  Place on fluid restriction of 2 L. Cardiology has been consulted. Continue to stress to patient importance of self efficacy and  on diet for CHF. Last BNP reviewed- and noted below   No results for input(s): "BNP", "BNPTRIAGEBLO" in the last 168 hours.  Cw iv lasix     4/3/24  -TTE with EF of 20-25%, moderately reduced RV function, pulmonary HTN, severe MR  -Continue IV diuresis  -Continue BB  -Will add ARB vs Entresto pending creatinine/BP trend  -Strict I's/O's  -Probable R/LHC tmw pending reassessment    4.4.2024  Right and left heart catheterization today.    Lasix held due to drop in pressure yesterday evening.    Discussed risks and benefits with the family, son and daughter at bedside.    4/5/24  -More SOB this AM with cough/orthopnea  -IV Lasix x one dose  -Continue Aldactone, BB    4.6.2024  Continue with balloon pump for today.    We will get a central line.  Discussed with ICU staff.  Check CVP and mixed venous.    Lactic acid normal.    Continue with IV diuresis.     4/8/24  -IABP remains in place  -Continue IV diuresis for now  -Continue BB  -Will optimize regimen further post procedure     4/9/24  -Hold further IV diuresis  -Continue BB  -BNP pending, will attempt to switch to po Lasix and add Entresto tmw    "

## 2024-04-09 NOTE — PHYSICIAN QUERY
PT Name: Chucho Clark  MR #: [id number]     DOCUMENTATION CLARIFICATION     CDS/: Luisa Gutierrez RN              Contact information: [email or web address]    This form is a permanent document in the medical record.     Query Date: April 9, 2024    By submitting this query, we are merely seeking further clarification of documentation.  Please utilize your independent clinical judgment when addressing the question(s) below.    The Medical Record contains the following   Indicators Supporting Clinical Findings Location in Medical Record   x Heart Failure documented Past Medical History:   CHF (congestive heart failure)      Patient who presents with acute combined CHF/ICM/MR/PAFL.      * Acute on chronic congestive heart failure  Patient is identified as having Diastolic (HFpEF) heart failure that is Acute.        Acute on chronic congestive heart failure  - History of combined HF (EF 30-35% with diastolic heart failure, severe mitral regurg and a pulmonary artery pressure 41)  - Successful diuresis this admit and is currently 11.5L negative   H&P 4/2       Hosp Med      PN 4/8       Cardio              PN 4/8       Cardio          x BNP    Lab 4/2   x EF/Echo   Left Ventricle: The left ventricle is severely dilated. Mildly increased wall thickness. There is moderately reduced systolic function with a visually estimated ejection fraction of 30 - 35%. There is diastolic dysfunction.    Right Ventricle: Normal right ventricular cavity size. Wall thickness is normal. Right ventricle wall motion  is normal. Systolic function is normal.   Echo 4/5   x Radiology findings IMPRESSION:    Perihilar interstitial thickening and lower lobe interstitial opacities could reflect pulmonary vascular congestion and mild interstitial edema.   CXR 4/2   x Subjective/Objective Respiratory Conditions  Associated sxs include SOB, palpitations, and increased HR H&P 4/2       Hosp Med    Recent/Current MI      Heart Transplant,  LVAD     x Edema, JVD Pitting edema to BLE. ED Provider note 4/2    Ascites     x Diuretics/Meds Cw iv lasix      Current Outpatient Medications on File Prior to Encounter :   Medication Sig    apixaban (ELIQUIS) 5 mg Tab Take 1 tablet (5 mg total) by mouth 2 (two) times daily.    metoprolol succinate (TOPROL-XL) 25 MG 24 hr tablet Take 1 tablet (25 mg total) by mouth once daily.    torsemide (DEMADEX) 20 MG Tab Take 1 tablet (20 mg total) by mouth 2 (two) times a day.    Consult 4/2       Cardio    H&P 4/2       Cardio    Other Treatment      Other       Heart failure is a clinical diagnosis which includes symptomatic fluid retention, elevated intracardiac pressures, and/or the inability of the heart to deliver adequate blood flow.    Heart Failure with reduced Ejection Fraction (HFrEF) or Systolic Heart Failure (loses ability to contract normally, EF is <40%)    Heart Failure with preserved Ejection Fraction (HFpEF) or Diastolic Heart Failure (stiff ventricles, does not relax properly, EF is >50%)     Heart Failure with Combined Systolic and Diastolic Failure (stiff ventricles, does not relax properly and EF is <50%)    Mid-range or mildly reduced ejection fraction (HFmrEF) is classified as systolic heart failure.  Congestive heart failure with a recovered EF is classified as Diastolic Heart Failure.  Common clues to acute exacerbation:  Rapidly progressive symptoms (w/in 2 weeks of presentation), using IV diuretics, using supplemental O2, pulmonary edema on Xray, new or worsening pleural effusion, +JVD or other signs of volume overload, MI w/in 4 weeks, and/or BNP >500  The clinical guidelines noted are only system guidelines, and do not replace the providers clinical judgment.    Due to conflicting documentation, please clarify the Type and Acuity of the Congestive Heart Failure diagnosis associated with the above clinical findings.    [   ]  Acute Diastolic Heart Failure (HFpEF) - new diagnosis     [   ]   Acute on Chronic Diastolic Heart Failure (HFpEF) - worsening of CHF signs/symptoms in preexisting CHF     [ x  ]  Acute Combined Systolic and Diastolic Heart Failure - new diagnosis     [   ]  Acute on Chronic Combined Systolic and Diastolic Heart Failure - worsening of CHF signs/symptoms in preexisting CHF     [   ]  Other (please specify): ___________________________________         Please document in your progress notes daily for the duration of treatment until resolved and include in your discharge summary.    References:  American Heart Association editorial staff. (2017, May). Ejection Fraction Heart Failure Measurement. American Heart Association. https://www.heart.org/en/health-topics/heart-failure/diagnosing-heart-failure/ejection-fraction-heart-failure-measurement#:~:text=Ejection%20fraction%20(EF)%20is%20a,pushed%20out%20with%20each%20heartbeat  MAGGI Faustin (2020, December 15). Heart failure with preserved ejection fraction: Clinical manifestations and diagnosis. [employer]. https://www.[employer].com/contents/heart-failure-with-preserved-ejection-fraction-clinical-manifestations-and-diagnosis.  ICD-10-CM/PCS Coding Clinic Third Quarter ICD-10, Effective with discharges: September 8, 2020 [facility] § Heart failure with mid-range or mildly reduced ejection fraction (2020).  ICD-10-CM/PCS Coding Clinic Third Quarter ICD-10, Effective with discharges: September 8, 2020 [facility] § Heart failure with recovered ejection fraction (2020).  Form No. 67968

## 2024-04-09 NOTE — PLAN OF CARE
04/09/24 1553   Rounds   Attendance Provider;Nurse ;Nurse;Charge nurse   Discharge Plan A Home Health   Why the patient remains in the hospital Requires continued medical care   Transition of Care Barriers None     May need PT/OT eval when able. DC dispo pending recs. CM will continue to follow.

## 2024-04-09 NOTE — ASSESSMENT & PLAN NOTE
- Diuresis, elevate extremities, supportive care as outlined elsewhere   - Would benefit from vascular op f/u

## 2024-04-09 NOTE — ASSESSMENT & PLAN NOTE
- s/p RODERICK with DCCV and R/LHC 4/4 that revealed severe 3 vessel disease, discussions were had with CT surgery and Life Vest arranged  - s/p repeat LHC 4/5 with PCI to mid left circ x1 and mid LAD x1, placement of balloon pump; repeat cath again 4/8 for RCA intervention, stent x3  - Plavix, ASA, statin, BB  - Mgmt per cards  - IABP was removed this morning

## 2024-04-09 NOTE — NURSING
No acute events during shift.   Pt oriented x4.  SB/SR on the monitor.  R groin IABP pulled today by Dr. Mayers, no issues.   Pt and son updated on plan of care.

## 2024-04-09 NOTE — ASSESSMENT & PLAN NOTE
04/06/2024.    Status post PCI to LAD and circumflex yesterday.    He will eventually need at this point PCI of his RCA  to help improve his mitral valve function    4/8/24  -Stable, CP free  -IABP remains in place  -Repeat [facility] with RCA intervention today by Dr. Mayers  -Continue ASA, statin, Plavix, heparin gtt, Imdur, BB    4/9/24  -s/p [facility] with successful intervention of RCA  -IABP removed  -Patient denies CP/angina  -Continue ASA, statin, Plavix, heparin gtt, Imdur, BB

## 2024-04-09 NOTE — SUBJECTIVE & OBJECTIVE
Objective:     Vital Signs (Most Recent):  Temp: 97.7 °F (36.5 °C) (04/09/24 0715)  Pulse: 64 (04/09/24 1001)  Resp: 17 (04/09/24 1001)  BP: (!) 101/51 (04/09/24 1000)  SpO2: 98 % (04/09/24 1001) Vital Signs (24h Range):  Temp:  [97.5 °F (36.4 °C)-98 °F (36.7 °C)] 97.7 °F (36.5 °C)  Pulse:  [] 64  Resp:  [16-36] 17  SpO2:  [88 %-99 %] 98 %  BP: ()/(46-67) 101/51     Weight: 117.1 kg (258 lb 2.5 oz)  Body mass index is 36.01 kg/m².  Intake/Output Summary (Last 24 hours) at 4/9/2024 1246  Last data filed at 4/9/2024 0600  Gross per 24 hour   Intake 861.14 ml   Output 1250 ml   Net -388.86 ml     Physical Exam  Constitutional:       General: He is not in acute distress.     Appearance: He is obese. He is ill-appearing.   HENT:      Head: Normocephalic.   Eyes:      General: No scleral icterus.     Conjunctiva/sclera: Conjunctivae normal.   Cardiovascular:      Rate and Rhythm: Normal rate and regular rhythm.      Pulses: Normal pulses.      Comments: NSR with occasional PVCs  Pulmonary:      Effort: Pulmonary effort is normal. No respiratory distress.   Abdominal:      General: There is no distension.      Palpations: Abdomen is soft.      Tenderness: There is no abdominal tenderness.   Musculoskeletal:      Right lower leg: Edema present.      Left lower leg: Edema present.      Comments: Brawny skin color changes to LLE   Skin:     General: Skin is warm.      Capillary Refill: Capillary refill takes 2 to 3 seconds.      Coloration: Skin is not jaundiced.   Neurological:      Mental Status: He is oriented to person, place, and time.   Psychiatric:         Behavior: Behavior normal.      Review of Systems   Constitutional:  Negative for fatigue and fever.   Respiratory:  Negative for chest tightness and shortness of breath.    Cardiovascular:  Negative for chest pain and palpitations.   Gastrointestinal:  Negative for nausea and vomiting.   Genitourinary:  Negative for difficulty urinating and dysuria.    Musculoskeletal:  Positive for back pain.   Neurological:  Negative for dizziness and headaches.   Psychiatric/Behavioral:  Negative for agitation and confusion.      Vents:  Oxygen Concentration (%): 28 (04/08/24 0711)    Lines/Drains/Airways       Peripherally Inserted Central Catheter Line  Duration             PICC Double Lumen 04/06/24 1325 right basilic 2 days              Drain  Duration                  Urethral Catheter 04/05/24 1600 3 days              Line  Duration                  IABP 04/05/24 1546 3 days              Peripheral Intravenous Line  Duration                  Peripheral IV - Single Lumen 04/05/24 1609 20 G Anterior;Left Forearm 3 days                  Significant Labs:    CBC/Anemia Profile:  Recent Labs   Lab 04/08/24  0503 04/09/24  0441   WBC 5.13 4.66   HGB 12.4* 11.0*   HCT 37.7* 33.5*    141*   MCV 96 97   RDW 13.2 13.2     Chemistries:  Recent Labs   Lab 04/07/24  2220 04/08/24  0503 04/09/24  0441   * 132* 134*   K 3.8 4.1 4.0   CL 95 95 96   CO2 29 31* 30*   BUN 21 22 23   CREATININE 1.0 1.1 1.0   CALCIUM 8.8 8.7 8.4*   MG  --  2.1 2.0   PHOS  --  3.5 3.7     POCT Glucose:   Recent Labs   Lab 04/08/24  1742 04/09/24  0858   POCTGLUCOSE 136* 91     Significant Imaging:  I have reviewed all pertinent imaging results/findings within the past 24 hours.  I have reviewed and interpreted all pertinent imaging results/findings within the past 24 hours.

## 2024-04-09 NOTE — SUBJECTIVE & OBJECTIVE
Review of Systems   Constitutional: Positive for malaise/fatigue.   HENT: Negative.     Eyes: Negative.    Cardiovascular: Negative.    Respiratory: Negative.     Endocrine: Negative.    Hematologic/Lymphatic: Negative.    Skin: Negative.    Musculoskeletal:  Positive for arthritis and joint pain.   Gastrointestinal: Negative.    Genitourinary: Negative.    Neurological: Negative.    Psychiatric/Behavioral: Negative.     Allergic/Immunologic: Negative.      Objective:     Vital Signs (Most Recent):  Temp: 97.7 °F (36.5 °C) (04/09/24 0715)  Pulse: 64 (04/09/24 1001)  Resp: 17 (04/09/24 1001)  BP: (!) 101/51 (04/09/24 1000)  SpO2: 98 % (04/09/24 1001) Vital Signs (24h Range):  Temp:  [97.5 °F (36.4 °C)-98 °F (36.7 °C)] 97.7 °F (36.5 °C)  Pulse:  [] 64  Resp:  [16-36] 17  SpO2:  [88 %-99 %] 98 %  BP: ()/(46-67) 101/51     Weight: 117.1 kg (258 lb 2.5 oz)  Body mass index is 36.01 kg/m².     SpO2: 98 %         Intake/Output Summary (Last 24 hours) at 4/9/2024 1304  Last data filed at 4/9/2024 0600  Gross per 24 hour   Intake 807.35 ml   Output 1150 ml   Net -342.65 ml       Lines/Drains/Airways       Peripherally Inserted Central Catheter Line  Duration             PICC Double Lumen 04/06/24 1325 right basilic 2 days              Drain  Duration                  Urethral Catheter 04/05/24 1600 3 days              Line  Duration                  IABP 04/05/24 1546 3 days              Peripheral Intravenous Line  Duration                  Peripheral IV - Single Lumen 04/05/24 1609 20 G Anterior;Left Forearm 3 days                       Physical Exam  Vitals and nursing note reviewed.   Constitutional:       General: He is not in acute distress.     Appearance: Normal appearance. He is well-developed. He is not diaphoretic.   HENT:      Head: Normocephalic and atraumatic.   Eyes:      General:         Right eye: No discharge.         Left eye: No discharge.      Pupils: Pupils are equal, round, and reactive  "to light.   Cardiovascular:      Rate and Rhythm: Normal rate and regular rhythm. Occasional Extrasystoles (PVC's) are present.     Heart sounds: S1 normal and S2 normal. Murmur heard.      High-pitched blowing holosystolic murmur is present at the apex.   Pulmonary:      Effort: Pulmonary effort is normal. No respiratory distress.      Breath sounds: Normal breath sounds.   Abdominal:      General: There is no distension.   Skin:     General: Skin is warm and dry.      Findings: No erythema.      Comments: R groin access site C/D/I; dressing in place, no drainage hematoma, intact pulse    L groin access site C/D/I; mild bruising, intact pulse, no hematoma or drainage   Neurological:      General: No focal deficit present.      Mental Status: He is alert and oriented to person, place, and time.   Psychiatric:         Mood and Affect: Mood normal.         Behavior: Behavior normal.         Thought Content: Thought content normal.            Significant Labs: CMP   Recent Labs   Lab 04/07/24  2220 04/08/24  0503 04/09/24  0441   * 132* 134*   K 3.8 4.1 4.0   CL 95 95 96   CO2 29 31* 30*    101 93   BUN 21 22 23   CREATININE 1.0 1.1 1.0   CALCIUM 8.8 8.7 8.4*   ANIONGAP 9 6* 8   , CBC   Recent Labs   Lab 04/08/24  0503 04/09/24  0441   WBC 5.13 4.66   HGB 12.4* 11.0*   HCT 37.7* 33.5*    141*   , Troponin No results for input(s): "TROPONINI" in the last 48 hours., and All pertinent lab results from the last 24 hours have been reviewed.    Significant Imaging: Echocardiogram: Transthoracic echo (TTE) complete (Cupid Only):   Results for orders placed or performed during the hospital encounter of 04/02/24   Echo   Result Value Ref Range    BSA 2.55 m2    LVOT stroke volume 61.14 cm3    LVIDd 5.50 3.5 - 6.0 cm    LV Systolic Volume 93.48 mL    LV Systolic Volume Index 38.0 mL/m2    LVIDs 4.52 (A) 2.1 - 4.0 cm    LV Diastolic Volume 147.39 mL    LV Diastolic Volume Index 59.91 mL/m2    IVS 1.70 (A) 0.6 " - 1.1 cm    LVOT diameter 2.03 cm    LVOT area 3.2 cm2    FS 18 (A) 28 - 44 %    Left Ventricle Relative Wall Thickness 0.49 cm    Posterior Wall 1.35 (A) 0.6 - 1.1 cm    LV mass 382.20 g    LV Mass Index 155 g/m2    MV Peak E Guru 1.29 m/s    MV Peak A Guru 0.49 m/s    TR Max Guru 3.07 m/s    E/A ratio 2.63     E wave deceleration time 154.04 msec    LVOT peak guru 1.01 m/s    Left Ventricular Outflow Tract Mean Velocity 0.94 cm/s    Left Ventricular Outflow Tract Mean Gradient 3.53 mmHg    TAPSE 2.25 cm    LA size 5.05 cm    Left Atrium Minor Axis 7.53 cm    Left Atrium Major Axis 7.28 cm    RA Major Axis 5.96 cm    AV regurgitation pressure 1/2 time 709.19527720401308 ms    AR Max Guru 2.83 m/s    AV mean gradient 8 mmHg    AV peak gradient 13 mmHg    Ao peak guru 1.81 m/s    Ao VTI 33.00 cm    LVOT peak VTI 18.90 cm    AV valve area 1.85 cm²    AV Velocity Ratio 0.56     AV index (prosthetic) 0.57     ZENIA by Velocity Ratio 1.81 cm²    Mr max guru 4.90 m/s    MV mean gradient 4 mmHg    MV peak gradient 12 mmHg    MV stenosis pressure 1/2 time 44.67 ms    MV valve area p 1/2 method 4.93 cm2    MV valve area by continuity eq 1.66 cm2    MV VTI 36.9 cm    TV mean gradient 33 mmHg    Triscuspid Valve Regurgitation Peak Gradient 38 mmHg    Ao root annulus 3.51 cm    STJ 3.32 cm    Ascending aorta 3.26 cm    IVC diameter 2.72 cm    ZLVIDS -4.00     ZLVIDD -8.28     LA Volume Index 69.8 mL/m2    LA volume 171.60 cm3    LA WIDTH 5.4 cm    RA Width 3.8 cm    TV resting pulmonary artery pressure 41 mmHg    RV TB RVSP 6 mmHg    Est. RA pres 3 mmHg    Narrative      Left Ventricle: The left ventricle is severely dilated. Mildly   increased wall thickness. There is moderately reduced systolic function   with a visually estimated ejection fraction of 30 - 35%. There is   diastolic dysfunction.    Right Ventricle: Normal right ventricular cavity size. Wall thickness   is normal. Right ventricle wall motion  is normal. Systolic  function is   normal.    Aortic Valve: There is mild aortic regurgitation.    Mitral Valve: There is severe regurgitation.    Pulmonary Artery: The estimated pulmonary artery systolic pressure is   41 mmHg.    IVC/SVC: Normal venous pressure at 3 mmHg.      and EKG: Reviewed

## 2024-04-09 NOTE — ASSESSMENT & PLAN NOTE
-Currently in aflutter HR low 100's  -Contributing to volume status/cardiomyopathy  -Continue BB  -Start amiodarone drip  -Continue heparin drip  -Will need RODERICK/DCCV post ischemic workup as well as EP evaluation to discuss ablation in future    4.4.2024  RODERICK cardioversion today after heart catheterization.    4/5/24  -Remains in SR s/p RODERICK/DCCV  -Amiodarone switched to po  -Continue BB  -Heparin gtt for now, will need Eliquis upon d/c    4.6.2024  BACK IN A FLUTTER THIS MORNING.  We will resume IV amiodarone.  Continue with p.o..    Add digoxin IV.    Continue with beta-blockers.    Continue with balloon pump for now.    He may need a repeat cardioversion if does not convert back to normal with medications.    Future plan for ablation as well.      4.7.2024  Dccv today   Family and patient agreeable     4/8/24  -Remains in SR s/p DCCV yesterday  -Continue amiodarone, BB, digoxin for now  -Continue heparin gtt, will need OAC prior to d/c    4/9/24  -Continue heparin gtt  -Continue amiodarone, BB  -EP consulted for ablation later this week

## 2024-04-09 NOTE — ASSESSMENT & PLAN NOTE
- History of combined HF (EF 30-35% with diastolic heart failure, severe mitral regurg and a pulmonary artery pressure 41)  - Successful diuresis this admit and is currently 20L negative since admission   - After heart catheterization on 04/05 patient had an episode of flash pulmonary edema with required BiPAP and diuresis, now on 2L NC, continue to wean as able  - Cards managing  - ASA, statin, plavix, BB, nitrate, lasix  - IABP removed this am; more comfortable on exam; hemodynamics stable

## 2024-04-09 NOTE — PROGRESS NOTES
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  Of note, patient had a first-time visit with Cardiology (Dr. Corrales) on 04/1 and was initiated on Eliquis for a flutter and was stated on metoprolol. In the ED, vitals: 130/69, 121, 22, 97.8F, 95% RA. Significant Labs: BNP: 560, Trop: 0.064, Bili: 1.1. CXR: interstitial edema. EKG: Neg for STEMI. Cardiology consulted in ED. Treated with ASA, Nitro, BB, diuretic. Initiated on Heparin Infusion. Placed was observation under the care of Hospital Medicine for management of elevated troponin, ACS rule out.  During hospital stay patient underwent diuresis and is-6.5 L as of 4/5.  On 04/04 patient was taken to the cath lab and underwent RODERICK with DCCV as well as a left-to-right heart catheterization which revealed severe three-vessel disease and a consultation by CT surgery was recommended; per documentation it appears discussions were had an incision was made to take the patient back to the cath lab on 04/05 were underwent another C with PCI to the mid left circ x1 in the mid LAD x1.  In the PACU patient had an episode of flash pulmonary edema for which he required NIPPV as well as diuretics.  Balloon pump was also placed prior to transfer to the ICU. It also appears that life vest is being arranged for discharge.     Hospital/ICU Course:  4/6: No acute events overnight, pt awake and  alert, on 2L NC, denies CP or SOB or any other issues, just wants to be able to move his right leg  4/7: No acute events, a fib on monitor with controlled rate, remains with IABP in place, on NC in NAD  4/8: Denies any chst pain/SOB. Complains of irritability to R thigh and difficulty getting in comfortable position. Plan for cath lab again today with Dr. Mayers.   4/9: Went to cath lab yesterday and stent x3. IABP pulled this am. Patient feels a lot more comfortable. Denies any chest pain/shortness of breath.     Objective:     Vital Signs (Most Recent):  Temp: 97.7 °F (36.5 °C) (04/09/24 0715)  Pulse: 64 (04/09/24 1001)  Resp: 17 (04/09/24 1001)  BP: (!) 101/51 (04/09/24 1000)  SpO2: 98 % (04/09/24 1001) Vital Signs (24h Range):  Temp:  [97.5 °F (36.4 °C)-98 °F (36.7 °C)] 97.7 °F (36.5 °C)  Pulse:  [] 64  Resp:  [16-36] 17  SpO2:  [88 %-99 %] 98 %  BP: ()/(46-67) 101/51     Weight: 117.1 kg (258 lb 2.5 oz)  Body mass index is 36.01 kg/m².  Intake/Output Summary (Last 24 hours) at 4/9/2024 1246  Last data filed at 4/9/2024 0600  Gross per 24 hour   Intake 861.14 ml   Output 1250 ml   Net -388.86 ml     Physical Exam  Constitutional:       General: He is not in acute distress.     Appearance: He is obese. He is ill-appearing.   HENT:      Head: Normocephalic.   Eyes:      General: No scleral icterus.     Conjunctiva/sclera: Conjunctivae normal.   Cardiovascular:      Rate and Rhythm: Normal rate and regular rhythm.      Pulses: Normal pulses.      Comments: NSR with occasional PVCs  Pulmonary:      Effort: Pulmonary effort is normal. No respiratory distress.   Abdominal:      General: There is no distension.      Palpations: Abdomen is soft.      Tenderness: There is no abdominal tenderness.   Musculoskeletal:      Right lower leg: Edema present.      Left lower leg: Edema present.      Comments: Brawny skin color changes to LLE   Skin:     General: Skin is warm.      Capillary Refill: Capillary refill  takes 2 to 3 seconds.      Coloration: Skin is not jaundiced.   Neurological:      Mental Status: He is oriented to person, place, and time.   Psychiatric:         Behavior: Behavior normal.      Review of Systems   Constitutional:  Negative for fatigue and fever.   Respiratory:  Negative for chest tightness and shortness of breath.    Cardiovascular:  Negative for chest pain and palpitations.   Gastrointestinal:  Negative for nausea and vomiting.   Genitourinary:  Negative for difficulty urinating and dysuria.   Musculoskeletal:  Positive for back pain.   Neurological:  Negative for dizziness and headaches.   Psychiatric/Behavioral:  Negative for agitation and confusion.      Vents:  Oxygen Concentration (%): 28 (04/08/24 0711)    Lines/Drains/Airways       Peripherally Inserted Central Catheter Line  Duration             PICC Double Lumen 04/06/24 1325 right basilic 2 days              Drain  Duration                  Urethral Catheter 04/05/24 1600 3 days              Line  Duration                  IABP 04/05/24 1546 3 days              Peripheral Intravenous Line  Duration                  Peripheral IV - Single Lumen 04/05/24 1609 20 G Anterior;Left Forearm 3 days                  Significant Labs:    CBC/Anemia Profile:  Recent Labs   Lab 04/08/24  0503 04/09/24  0441   WBC 5.13 4.66   HGB 12.4* 11.0*   HCT 37.7* 33.5*    141*   MCV 96 97   RDW 13.2 13.2     Chemistries:  Recent Labs   Lab 04/07/24  2220 04/08/24  0503 04/09/24  0441   * 132* 134*   K 3.8 4.1 4.0   CL 95 95 96   CO2 29 31* 30*   BUN 21 22 23   CREATININE 1.0 1.1 1.0   CALCIUM 8.8 8.7 8.4*   MG  --  2.1 2.0   PHOS  --  3.5 3.7     POCT Glucose:   Recent Labs   Lab 04/08/24  1742 04/09/24  0858   POCTGLUCOSE 136* 91     Significant Imaging:  I have reviewed all pertinent imaging results/findings within the past 24 hours.  I have reviewed and interpreted all pertinent imaging results/findings within the past 24 hours.    ABG  Recent  Labs   Lab 04/07/24  2214   PH 7.423   PO2 32*   PCO2 52.1*   HCO3 34.1*   BE 10*     Assessment/Plan:     Cardiac/Vascular  * Acute on chronic congestive heart failure  - History of combined HF (EF 30-35% with diastolic heart failure, severe mitral regurg and a pulmonary artery pressure 41)  - Successful diuresis this admit and is currently 20L negative since admission   - After heart catheterization on 04/05 patient had an episode of flash pulmonary edema with required BiPAP and diuresis, now on 2L NC, continue to wean as able  - Cards managing  - ASA, statin, plavix, BB, nitrate, lasix  - IABP removed this am; more comfortable on exam; hemodynamics stable; still with bed rest restrictions for the rest of the day     On intra-aortic balloon pump assist  - Removed 4/9    Atrial flutter  - s/p RODERICK and DCCV 4/4, potential repeat DCCV 4/7 per cards  - On amio and BB; Digoxin d/c'ed  - Heparin gtt d/c'ed  - Patient NSR on monitor this morning with occasional PVCs; HR controlled    Chest pain  - Improved; see plan for CHF/CAD    Elevated troponin  - see plan for CAD    Venous stasis dermatitis of both lower extremities  - Diuresis, elevate extremities, supportive care as outlined elsewhere   - Would benefit from vascular op f/u    Essential hypertension  - Mgmt per cards  - Currently on BB, nitrate, lasix  - Trend hemodynamics and adjust meds as needed; BP is stable     Coronary artery disease involving native coronary artery of native heart  - s/p RODERICK with DCCV and R/LHC 4/4 that revealed severe 3 vessel disease, discussions were had with CT surgery and Life Vest arranged  - s/p repeat LHC 4/5 with PCI to mid left circ x1 and mid LAD x1, placement of balloon pump; repeat cath again 4/8 for RCA intervention, stent x3  - Plavix, ASA, statin, BB  - Mgmt per cards  - IABP was removed this morning    Endocrine  BMI 39.0-39.9,adult  - Affecting medical decision making and complicating the above   - Pt would benefit greatly  from weight loss and lifestyle modifications      Critical Care Time: 34 minutes  Critical secondary to Patient has a condition that poses threat to life and bodily function: CHF     Critical care was time spent personally by me on the following activities: development of treatment plan with patient or surrogate and bedside caregivers, discussions with consultants, evaluation of patient's response to treatment, examination of patient, ordering and performing treatments and interventions, ordering and review of laboratory studies, ordering and review of radiographic studies, pulse oximetry, re-evaluation of patient's condition. This critical care time did not overlap with that of any other provider or involve time for any procedures.     Clifford Lo PA-C  Critical Care Medicine  [facility] - Intensive Care ([facility])

## 2024-04-09 NOTE — ASSESSMENT & PLAN NOTE
- s/p RODERICK and DCCV 4/4, potential repeat DCCV 4/7 per cards  - On amio and BB; Digoxin d/c'ed  - Heparin gtt d/c'ed  - Patient NSR on monitor this morning with occasional PVCs; HR controlled

## 2024-04-10 PROBLEM — Z98.890 ON INTRA-AORTIC BALLOON PUMP ASSIST: Status: RESOLVED | Noted: 2024-04-06 | Resolved: 2024-04-10

## 2024-04-10 PROBLEM — M25.551 HIP PAIN, ACUTE, RIGHT: Status: ACTIVE | Noted: 2024-04-10

## 2024-04-10 LAB
ANION GAP SERPL CALC-SCNC: 6 MMOL/L (ref 8–16)
BASOPHILS # BLD AUTO: 0.02 K/UL (ref 0–0.2)
BASOPHILS NFR BLD: 0.4 % (ref 0–1.9)
BUN SERPL-MCNC: 27 MG/DL (ref 8–23)
CALCIUM SERPL-MCNC: 8.4 MG/DL (ref 8.7–10.5)
CHLORIDE SERPL-SCNC: 96 MMOL/L (ref 95–110)
CO2 SERPL-SCNC: 31 MMOL/L (ref 23–29)
CREAT SERPL-MCNC: 1 MG/DL (ref 0.5–1.4)
DIFFERENTIAL METHOD BLD: ABNORMAL
EOSINOPHIL # BLD AUTO: 0.1 K/UL (ref 0–0.5)
EOSINOPHIL NFR BLD: 1.7 % (ref 0–8)
ERYTHROCYTE [DISTWIDTH] IN BLOOD BY AUTOMATED COUNT: 12.9 % (ref 11.5–14.5)
EST. GFR  (NO RACE VARIABLE): >60 ML/MIN/1.73 M^2
GLUCOSE SERPL-MCNC: 92 MG/DL (ref 70–110)
HCT VFR BLD AUTO: 32.4 % (ref 40–54)
HGB BLD-MCNC: 10.6 G/DL (ref 14–18)
IMM GRANULOCYTES # BLD AUTO: 0.09 K/UL (ref 0–0.04)
IMM GRANULOCYTES NFR BLD AUTO: 0 % (ref 0–0.5)
LYMPHOCYTES # BLD AUTO: 0.7 K/UL (ref 1–4.8)
LYMPHOCYTES NFR BLD: 14.7 % (ref 18–48)
MAGNESIUM SERPL-MCNC: 2 MG/DL (ref 1.6–2.6)
MCH RBC QN AUTO: 31.5 PG (ref 27–31)
MCHC RBC AUTO-ENTMCNC: 32.7 G/DL (ref 32–36)
MCV RBC AUTO: 96 FL (ref 82–98)
MONOCYTES # BLD AUTO: 0.6 K/UL (ref 0.3–1)
MONOCYTES NFR BLD: 11.4 % (ref 4–15)
NEUTROPHILS # BLD AUTO: 3.4 K/UL (ref 1.8–7.7)
NEUTROPHILS NFR BLD: 69.9 % (ref 38–73)
NRBC BLD-RTO: 0 /100 WBC
PHOSPHATE SERPL-MCNC: 3.3 MG/DL (ref 2.7–4.5)
PLATELET # BLD AUTO: 151 K/UL (ref 150–450)
PLATELET BLD QL SMEAR: ABNORMAL
PMV BLD AUTO: 9.7 FL (ref 9.2–12.9)
POCT GLUCOSE: 93 MG/DL (ref 70–110)
POTASSIUM SERPL-SCNC: 4.2 MMOL/L (ref 3.5–5.1)
RBC # BLD AUTO: 3.36 M/UL (ref 4.6–6.2)
SODIUM SERPL-SCNC: 133 MMOL/L (ref 136–145)
WBC # BLD AUTO: 4.83 K/UL (ref 3.9–12.7)

## 2024-04-10 PROCEDURE — 80048 BASIC METABOLIC PNL TOTAL CA: CPT | Performed by: INTERNAL MEDICINE

## 2024-04-10 PROCEDURE — 25000003 PHARM REV CODE 250: Performed by: INTERNAL MEDICINE

## 2024-04-10 PROCEDURE — 99233 SBSQ HOSP IP/OBS HIGH 50: CPT | Mod: ,,, | Performed by: INTERNAL MEDICINE

## 2024-04-10 PROCEDURE — 25000003 PHARM REV CODE 250: Performed by: PHYSICIAN ASSISTANT

## 2024-04-10 PROCEDURE — 97162 PT EVAL MOD COMPLEX 30 MIN: CPT

## 2024-04-10 PROCEDURE — 85025 COMPLETE CBC W/AUTO DIFF WBC: CPT | Performed by: INTERNAL MEDICINE

## 2024-04-10 PROCEDURE — 63600175 PHARM REV CODE 636 W HCPCS: Performed by: INTERNAL MEDICINE

## 2024-04-10 PROCEDURE — 99900035 HC TECH TIME PER 15 MIN (STAT)

## 2024-04-10 PROCEDURE — 97530 THERAPEUTIC ACTIVITIES: CPT

## 2024-04-10 PROCEDURE — 11000001 HC ACUTE MED/SURG PRIVATE ROOM

## 2024-04-10 PROCEDURE — 83735 ASSAY OF MAGNESIUM: CPT | Performed by: INTERNAL MEDICINE

## 2024-04-10 PROCEDURE — A4216 STERILE WATER/SALINE, 10 ML: HCPCS | Performed by: INTERNAL MEDICINE

## 2024-04-10 PROCEDURE — 94761 N-INVAS EAR/PLS OXIMETRY MLT: CPT

## 2024-04-10 PROCEDURE — 99223 1ST HOSP IP/OBS HIGH 75: CPT | Mod: ,,, | Performed by: INTERNAL MEDICINE

## 2024-04-10 PROCEDURE — 84100 ASSAY OF PHOSPHORUS: CPT | Performed by: INTERNAL MEDICINE

## 2024-04-10 PROCEDURE — 27000221 HC OXYGEN, UP TO 24 HOURS

## 2024-04-10 RX ORDER — POLYETHYLENE GLYCOL 3350 17 G/17G
17 POWDER, FOR SOLUTION ORAL DAILY
Status: DISCONTINUED | OUTPATIENT
Start: 2024-04-10 | End: 2024-04-12 | Stop reason: HOSPADM

## 2024-04-10 RX ORDER — AMOXICILLIN 250 MG
2 CAPSULE ORAL 2 TIMES DAILY PRN
Status: DISCONTINUED | OUTPATIENT
Start: 2024-04-10 | End: 2024-04-10

## 2024-04-10 RX ORDER — ACETAMINOPHEN 325 MG/1
650 TABLET ORAL EVERY 8 HOURS
Status: DISCONTINUED | OUTPATIENT
Start: 2024-04-10 | End: 2024-04-12 | Stop reason: HOSPADM

## 2024-04-10 RX ORDER — AMOXICILLIN 250 MG
2 CAPSULE ORAL 2 TIMES DAILY
Status: DISCONTINUED | OUTPATIENT
Start: 2024-04-10 | End: 2024-04-12 | Stop reason: HOSPADM

## 2024-04-10 RX ADMIN — HYDROCODONE BITARTRATE AND ACETAMINOPHEN 1 TABLET: 5; 325 TABLET ORAL at 12:04

## 2024-04-10 RX ADMIN — ASPIRIN 81 MG: 81 TABLET, COATED ORAL at 08:04

## 2024-04-10 RX ADMIN — APIXABAN 5 MG: 2.5 TABLET, FILM COATED ORAL at 08:04

## 2024-04-10 RX ADMIN — DOCUSATE SODIUM 50MG AND SENNOSIDES 8.6MG 2 TABLET: 8.6; 5 TABLET, FILM COATED ORAL at 08:04

## 2024-04-10 RX ADMIN — MUPIROCIN: 20 OINTMENT TOPICAL at 08:04

## 2024-04-10 RX ADMIN — SODIUM CHLORIDE, PRESERVATIVE FREE 10 ML: 5 INJECTION INTRAVENOUS at 12:04

## 2024-04-10 RX ADMIN — HYDROCODONE BITARTRATE AND ACETAMINOPHEN 1 TABLET: 5; 325 TABLET ORAL at 04:04

## 2024-04-10 RX ADMIN — ATORVASTATIN CALCIUM 40 MG: 40 TABLET, FILM COATED ORAL at 08:04

## 2024-04-10 RX ADMIN — POLYETHYLENE GLYCOL 3350 17 G: 17 POWDER, FOR SOLUTION ORAL at 08:04

## 2024-04-10 RX ADMIN — ISOSORBIDE MONONITRATE 30 MG: 30 TABLET, EXTENDED RELEASE ORAL at 08:04

## 2024-04-10 RX ADMIN — METOPROLOL TARTRATE 25 MG: 25 TABLET, FILM COATED ORAL at 08:04

## 2024-04-10 RX ADMIN — MORPHINE SULFATE 2 MG: 4 INJECTION INTRAVENOUS at 04:04

## 2024-04-10 RX ADMIN — HYDROCODONE BITARTRATE AND ACETAMINOPHEN 1 TABLET: 5; 325 TABLET ORAL at 08:04

## 2024-04-10 RX ADMIN — FAMOTIDINE 20 MG: 20 TABLET ORAL at 08:04

## 2024-04-10 RX ADMIN — AMIODARONE HYDROCHLORIDE 200 MG: 200 TABLET ORAL at 08:04

## 2024-04-10 RX ADMIN — SODIUM CHLORIDE, PRESERVATIVE FREE 10 ML: 5 INJECTION INTRAVENOUS at 05:04

## 2024-04-10 RX ADMIN — CLOPIDOGREL BISULFATE 75 MG: 75 TABLET ORAL at 08:04

## 2024-04-10 RX ADMIN — ACETAMINOPHEN 650 MG: 325 TABLET ORAL at 10:04

## 2024-04-10 NOTE — PROGRESS NOTES
Inpatient post cath rounding completed on 04/10/2024. Pts procedural site CDI, free of bleeding or hematoma. Bilateral groin bruising present, improved since yesterday. All post-procedure questions answered. Pt expressed positive experience with cath lab team and gratitude for our participation and follow up with their care.

## 2024-04-10 NOTE — SUBJECTIVE & OBJECTIVE
Review of Systems   Constitutional: Positive for malaise/fatigue.   HENT: Negative.     Eyes: Negative.    Cardiovascular: Negative.    Respiratory: Negative.     Endocrine: Negative.    Hematologic/Lymphatic: Negative.    Skin: Negative.    Musculoskeletal:  Positive for arthritis, back pain and joint pain.   Gastrointestinal: Negative.    Genitourinary: Negative.    Neurological: Negative.    Psychiatric/Behavioral: Negative.     Allergic/Immunologic: Negative.      Objective:     Vital Signs (Most Recent):  Temp: 98.6 °F (37 °C) (04/10/24 1136)  Pulse: (!) 57 (04/10/24 1200)  Resp: 17 (04/10/24 1200)  BP: (!) 95/52 (04/10/24 1200)  SpO2: 100 % (04/10/24 1200) Vital Signs (24h Range):  Temp:  [97.6 °F (36.4 °C)-98.6 °F (37 °C)] 98.6 °F (37 °C)  Pulse:  [57-70] 57  Resp:  [15-31] 17  SpO2:  [92 %-100 %] 100 %  BP: ()/(51-59) 95/52     Weight: 111.3 kg (245 lb 6 oz)  Body mass index is 34.22 kg/m².     SpO2: 100 %         Intake/Output Summary (Last 24 hours) at 4/10/2024 1410  Last data filed at 4/10/2024 1200  Gross per 24 hour   Intake 493 ml   Output 1170 ml   Net -677 ml       Lines/Drains/Airways       Peripherally Inserted Central Catheter Line  Duration             PICC Double Lumen 04/06/24 1325 right basilic 4 days                       Physical Exam  Vitals and nursing note reviewed.   Constitutional:       Appearance: He is well-developed.   HENT:      Head: Normocephalic.   Neck:      Thyroid: No thyromegaly.      Vascular: No carotid bruit or JVD.   Cardiovascular:      Rate and Rhythm: Normal rate and regular rhythm.      Pulses:           Radial pulses are 2+ on the right side and 2+ on the left side.      Heart sounds: S1 normal and S2 normal. Heart sounds not distant. No midsystolic click and no opening snap. No murmur heard.     No friction rub. No S3 or S4 sounds.      Comments: Bilateral femoral access site with mild bruising, no hematoma, intact pulse.  Pulmonary:      Effort:  "Pulmonary effort is normal.      Breath sounds: Normal breath sounds. No wheezing or rales.   Abdominal:      General: Bowel sounds are normal. There is no distension or abdominal bruit.      Palpations: Abdomen is soft.      Tenderness: There is no abdominal tenderness.   Musculoskeletal:      Cervical back: Normal range of motion and neck supple.   Skin:     General: Skin is warm.   Neurological:      Mental Status: He is alert and oriented to person, place, and time.   Psychiatric:         Behavior: Behavior normal.            Significant Labs: ABG: No results for input(s): "PH", "PCO2", "HCO3", "POCSATURATED", "BE" in the last 48 hours., Blood Culture: No results for input(s): "LABBLOO" in the last 48 hours., BMP:   Recent Labs   Lab 04/09/24  0441 04/10/24  0434   GLU 93 92   * 133*   K 4.0 4.2   CL 96 96   CO2 30* 31*   BUN 23 27*   CREATININE 1.0 1.0   CALCIUM 8.4* 8.4*   MG 2.0 2.0   , CMP   Recent Labs   Lab 04/09/24  0441 04/10/24  0434   * 133*   K 4.0 4.2   CL 96 96   CO2 30* 31*   GLU 93 92   BUN 23 27*   CREATININE 1.0 1.0   CALCIUM 8.4* 8.4*   ANIONGAP 8 6*   , CBC   Recent Labs   Lab 04/09/24  0441 04/10/24  0434   WBC 4.66 4.83   HGB 11.0* 10.6*   HCT 33.5* 32.4*   * 151   , INR No results for input(s): "INR", "PROTIME" in the last 48 hours., Lipid Panel No results for input(s): "CHOL", "HDL", "LDLCALC", "TRIG", "CHOLHDL" in the last 48 hours., and Troponin No results for input(s): "TROPONINI" in the last 48 hours.    Significant Imaging: Echocardiogram: Transthoracic echo (TTE) complete (Cupid Only):   Results for orders placed or performed during the hospital encounter of 04/02/24   Echo   Result Value Ref Range    BSA 2.55 m2    LVOT stroke volume 61.14 cm3    LVIDd 5.50 3.5 - 6.0 cm    LV Systolic Volume 93.48 mL    LV Systolic Volume Index 38.0 mL/m2    LVIDs 4.52 (A) 2.1 - 4.0 cm    LV Diastolic Volume 147.39 mL    LV Diastolic Volume Index 59.91 mL/m2    IVS 1.70 (A) 0.6 - " 1.1 cm    LVOT diameter 2.03 cm    LVOT area 3.2 cm2    FS 18 (A) 28 - 44 %    Left Ventricle Relative Wall Thickness 0.49 cm    Posterior Wall 1.35 (A) 0.6 - 1.1 cm    LV mass 382.20 g    LV Mass Index 155 g/m2    MV Peak E Guru 1.29 m/s    MV Peak A Guru 0.49 m/s    TR Max Guru 3.07 m/s    E/A ratio 2.63     E wave deceleration time 154.04 msec    LVOT peak guru 1.01 m/s    Left Ventricular Outflow Tract Mean Velocity 0.94 cm/s    Left Ventricular Outflow Tract Mean Gradient 3.53 mmHg    TAPSE 2.25 cm    LA size 5.05 cm    Left Atrium Minor Axis 7.53 cm    Left Atrium Major Axis 7.28 cm    RA Major Axis 5.96 cm    AV regurgitation pressure 1/2 time 709.44170729371638 ms    AR Max Guru 2.83 m/s    AV mean gradient 8 mmHg    AV peak gradient 13 mmHg    Ao peak guru 1.81 m/s    Ao VTI 33.00 cm    LVOT peak VTI 18.90 cm    AV valve area 1.85 cm²    AV Velocity Ratio 0.56     AV index (prosthetic) 0.57     ZENIA by Velocity Ratio 1.81 cm²    Mr max guru 4.90 m/s    MV mean gradient 4 mmHg    MV peak gradient 12 mmHg    MV stenosis pressure 1/2 time 44.67 ms    MV valve area p 1/2 method 4.93 cm2    MV valve area by continuity eq 1.66 cm2    MV VTI 36.9 cm    TV mean gradient 33 mmHg    Triscuspid Valve Regurgitation Peak Gradient 38 mmHg    Ao root annulus 3.51 cm    STJ 3.32 cm    Ascending aorta 3.26 cm    IVC diameter 2.72 cm    ZLVIDS -4.00     ZLVIDD -8.28     LA Volume Index 69.8 mL/m2    LA volume 171.60 cm3    LA WIDTH 5.4 cm    RA Width 3.8 cm    TV resting pulmonary artery pressure 41 mmHg    RV TB RVSP 6 mmHg    Est. RA pres 3 mmHg    Narrative      Left Ventricle: The left ventricle is severely dilated. Mildly   increased wall thickness. There is moderately reduced systolic function   with a visually estimated ejection fraction of 30 - 35%. There is   diastolic dysfunction.    Right Ventricle: Normal right ventricular cavity size. Wall thickness   is normal. Right ventricle wall motion  is normal. Systolic function  is   normal.    Aortic Valve: There is mild aortic regurgitation.    Mitral Valve: There is severe regurgitation.    Pulmonary Artery: The estimated pulmonary artery systolic pressure is   41 mmHg.    IVC/SVC: Normal venous pressure at 3 mmHg.

## 2024-04-10 NOTE — SUBJECTIVE & OBJECTIVE
Interval History:  Patient seen examined with family at bedside.  Reports that he is sitting in the chair all day and feeling much better.  Ann catheter was Dc'ed and he has thus far had trouble urinating.  Also complains of constipation and no BM for the past 4-5 days.  Patient complains right hip pain making it difficult to ambulate.    Review of Systems   Constitutional:  Positive for activity change, appetite change and fatigue.   Respiratory:  Negative for cough, choking, chest tightness and shortness of breath.    Cardiovascular:  Negative for chest pain, palpitations and leg swelling.        Chronic venous stasis bilateral lower extremities   Gastrointestinal:  Positive for constipation.   Genitourinary:  Positive for difficulty urinating.   Musculoskeletal:  Positive for arthralgias. Back pain: Right hip.  Skin:  Positive for color change (bilateral lower extremities).   Neurological:  Positive for weakness.   All other systems reviewed and are negative.    Objective:     Vital Signs (Most Recent):  Temp: 97.8 °F (36.6 °C) (04/10/24 1511)  Pulse: 63 (04/10/24 1700)  Resp: (!) 30 (04/10/24 1700)  BP: (!) 99/54 (04/10/24 1700)  SpO2: 98 % (04/10/24 1700) Vital Signs (24h Range):  Temp:  [97.8 °F (36.6 °C)-98.6 °F (37 °C)] 97.8 °F (36.6 °C)  Pulse:  [57-70] 63  Resp:  [15-31] 30  SpO2:  [92 %-100 %] 98 %  BP: ()/(51-59) 99/54     Weight: 111.3 kg (245 lb 6 oz)  Body mass index is 34.22 kg/m².    Intake/Output Summary (Last 24 hours) at 4/10/2024 1718  Last data filed at 4/10/2024 1200  Gross per 24 hour   Intake 493 ml   Output 1020 ml   Net -527 ml         Physical Exam  Vitals reviewed.   Constitutional:       Appearance: Normal appearance. He is obese.   HENT:      Head: Normocephalic and atraumatic.      Mouth/Throat:      Mouth: Mucous membranes are moist.      Pharynx: Oropharynx is clear.   Eyes:      Extraocular Movements: Extraocular movements intact.      Conjunctiva/sclera: Conjunctivae  normal.   Cardiovascular:      Rate and Rhythm: Regular rhythm. Bradycardia present.      Pulses: Normal pulses.      Heart sounds: Normal heart sounds.   Pulmonary:      Effort: Pulmonary effort is normal.      Breath sounds: Normal breath sounds.   Abdominal:      General: Bowel sounds are normal.      Palpations: Abdomen is soft.   Musculoskeletal:         General: Normal range of motion.      Cervical back: Normal range of motion and neck supple.   Skin:     General: Skin is warm and dry.      Comments: Chronic lower extremity venous stasis changes with color changes.  No pitting edema   Neurological:      General: No focal deficit present.      Mental Status: He is alert and oriented to person, place, and time. Mental status is at baseline.   Psychiatric:         Mood and Affect: Mood normal.         Behavior: Behavior normal.         Thought Content: Thought content normal.             Significant Labs: All pertinent labs within the past 24 hours have been reviewed.  CBC:   Recent Labs   Lab 04/09/24  0441 04/10/24  0434   WBC 4.66 4.83   HGB 11.0* 10.6*   HCT 33.5* 32.4*   * 151     CMP:   Recent Labs   Lab 04/09/24 0441 04/10/24  0434   * 133*   K 4.0 4.2   CL 96 96   CO2 30* 31*   GLU 93 92   BUN 23 27*   CREATININE 1.0 1.0   CALCIUM 8.4* 8.4*   ANIONGAP 8 6*       Significant Imaging: I have reviewed all pertinent imaging results/findings within the past 24 hours.

## 2024-04-10 NOTE — ASSESSMENT & PLAN NOTE
- s/p RODERICK and DCCV 4/4, repeat DCCV 4/7 per cards  - On amio and BB; Digoxin d/c'ed  - ASA, eliquis, plavix; Heparin gtt d/c'ed after IABP removed  - Patient NSR on monitor with occasional PVCs; HR controlled  - EP has been consulted per cards

## 2024-04-10 NOTE — PROGRESS NOTES
[facility] - [facility] Care ([facility])  Cardiology  Progress Note    Patient Name: Chucho Clark  MRN: [id number]  Admission Date: 4/2/2024  Hospital Length of Stay: 7 days  Code Status: Full Code   Attending Physician: Wilder Friedman MD   Primary Care Physician: Ale, Primary Doctor  Expected Discharge Date:   Principal Problem:Acute on chronic congestive heart failure    Subjective:     HPI:  78 y.o. male patient, PMHx: Diastolic HF, HTN, DVT, CAD. Presented to the Emergency Department for evaluation of CP which onset night PTA. Pt visited Dr. Corrales (Cardiology) for the first time on 4/1/24 and was initiated on Eliquis. Pt notes that he also felt some upper abd pain PTA that felt like acid reflux. Pt and son originally thought sxs were cold/flu sxs, but tested negative in the clinic. Symptoms are constant and moderate in severity. No mitigating or exacerbating factors reported. Associated sxs include SOB, palpitations, and increased HR. Patient denies any cough, numbness, HA, fever, n/v/d, and all other sxs at this time. Pt has not had a stress test. In the ED, vitals: 130/69, 121, 22, 97.8F, 95% RA. Significant Labs: BNP: 560, Trop: 0.064, Bili: 1.1. CXR: interstitial edema. EKG: Neg for STEMI. Cardiology consulted in ED. Treated with ASA, Nitro, BB, diuretic. Initiated on Heparin Infusion. Patient is a full code. Placed in observation under the care of Hospital Medicine for management of elevated troponin, ACS rule out.        Cardiology consulted      Hospital Course:   4/3/24-Patient seen and examined today, resting in bed. Feeling better. Less SOB, diuresed well overnight. Still has significant BLE edema. Labs reviewed/stable. EKG with aflutter. TTE with EF of 20-25%, decreased RV function, severe MR. Troponin flat. Discussed ischemic workup possibly tmw pending clinical condition.     4.4.2024  Still in aflutter, swelling significantly improved   In bed, laying comfortably    4/5/24-Patient seen and examined  today, s/p R/LHC yesterday which showed severe triple vessel disease. Underwent RODERICK/DCCV with restoration of SR. Feels ok. Does have some increase in SOB/orthopnea. No CP symptoms. Remains in SR. Reviewed case with patient/CTS, will proceed with repeat LHC today and PCI. LifeVest ordered for SCD prevention.    4.7.2024  Still in aflutter  Discussed with son and patient , sister   Agreeable with dccv    4/8/24-Patient seen and examined today, resting in bed. Family at bedside. Feels ok. SOB stable. No CP. Remains in SR. IABP in place. Labs reviewed. Na 132, creatinine 1.1. Repeat LHC with RCA intervention planned today by Dr. Mayers.    4/9/24-Patient seen and examined today, s/p LHC yesterday with successful PCI of RCA. IABP removed this AM. Patient feels ok, does admit to fatigue. No CP or SOB. Labs reviewed/stable. BP soft, BNP pending. Will likely switch to po Lasix tmw.    4/10/24: Pt seen and examined in ICU this am. No acute issues noted.  No angina/CHF issues.  Labs/chart reviewed. Pt states  he feels ok today.  BP soft.          Review of Systems   Constitutional: Positive for malaise/fatigue.   HENT: Negative.     Eyes: Negative.    Cardiovascular: Negative.    Respiratory: Negative.     Endocrine: Negative.    Hematologic/Lymphatic: Negative.    Skin: Negative.    Musculoskeletal:  Positive for arthritis, back pain and joint pain.   Gastrointestinal: Negative.    Genitourinary: Negative.    Neurological: Negative.    Psychiatric/Behavioral: Negative.     Allergic/Immunologic: Negative.      Objective:     Vital Signs (Most Recent):  Temp: 98.6 °F (37 °C) (04/10/24 1136)  Pulse: (!) 57 (04/10/24 1200)  Resp: 17 (04/10/24 1200)  BP: (!) 95/52 (04/10/24 1200)  SpO2: 100 % (04/10/24 1200) Vital Signs (24h Range):  Temp:  [97.6 °F (36.4 °C)-98.6 °F (37 °C)] 98.6 °F (37 °C)  Pulse:  [57-70] 57  Resp:  [15-31] 17  SpO2:  [92 %-100 %] 100 %  BP: ()/(51-59) 95/52     Weight: 111.3 kg (245 lb 6 oz)  Body mass  "index is 34.22 kg/m².     SpO2: 100 %         Intake/Output Summary (Last 24 hours) at 4/10/2024 1410  Last data filed at 4/10/2024 1200  Gross per 24 hour   Intake 493 ml   Output 1170 ml   Net -677 ml       Lines/Drains/Airways       Peripherally Inserted Central Catheter Line  Duration             PICC Double Lumen 04/06/24 1325 right basilic 4 days                       Physical Exam  Vitals and nursing note reviewed.   Constitutional:       Appearance: He is well-developed.   HENT:      Head: Normocephalic.   Neck:      Thyroid: No thyromegaly.      Vascular: No carotid bruit or JVD.   Cardiovascular:      Rate and Rhythm: Normal rate and regular rhythm.      Pulses:           Radial pulses are 2+ on the right side and 2+ on the left side.      Heart sounds: S1 normal and S2 normal. Heart sounds not distant. No midsystolic click and no opening snap. No murmur heard.     No friction rub. No S3 or S4 sounds.      Comments: Bilateral femoral access site with mild bruising, no hematoma, intact pulse.  Pulmonary:      Effort: Pulmonary effort is normal.      Breath sounds: Normal breath sounds. No wheezing or rales.   Abdominal:      General: Bowel sounds are normal. There is no distension or abdominal bruit.      Palpations: Abdomen is soft.      Tenderness: There is no abdominal tenderness.   Musculoskeletal:      Cervical back: Normal range of motion and neck supple.   Skin:     General: Skin is warm.   Neurological:      Mental Status: He is alert and oriented to person, place, and time.   Psychiatric:         Behavior: Behavior normal.            Significant Labs: ABG: No results for input(s): "PH", "PCO2", "HCO3", "POCSATURATED", "BE" in the last 48 hours., Blood Culture: No results for input(s): "LABBLOO" in the last 48 hours., BMP:   Recent Labs   Lab 04/09/24  0441 04/10/24  0434   GLU 93 92   * 133*   K 4.0 4.2   CL 96 96   CO2 30* 31*   BUN 23 27*   CREATININE 1.0 1.0   CALCIUM 8.4* 8.4*   MG 2.0 " "2.0   , CMP   Recent Labs   Lab 04/09/24  0441 04/10/24  0434   * 133*   K 4.0 4.2   CL 96 96   CO2 30* 31*   GLU 93 92   BUN 23 27*   CREATININE 1.0 1.0   CALCIUM 8.4* 8.4*   ANIONGAP 8 6*   , CBC   Recent Labs   Lab 04/09/24  0441 04/10/24  0434   WBC 4.66 4.83   HGB 11.0* 10.6*   HCT 33.5* 32.4*   * 151   , INR No results for input(s): "INR", "PROTIME" in the last 48 hours., Lipid Panel No results for input(s): "CHOL", "HDL", "LDLCALC", "TRIG", "CHOLHDL" in the last 48 hours., and Troponin No results for input(s): "TROPONINI" in the last 48 hours.    Significant Imaging: Echocardiogram: Transthoracic echo (TTE) complete (Cupid Only):   Results for orders placed or performed during the hospital encounter of 04/02/24   Echo   Result Value Ref Range    BSA 2.55 m2    LVOT stroke volume 61.14 cm3    LVIDd 5.50 3.5 - 6.0 cm    LV Systolic Volume 93.48 mL    LV Systolic Volume Index 38.0 mL/m2    LVIDs 4.52 (A) 2.1 - 4.0 cm    LV Diastolic Volume 147.39 mL    LV Diastolic Volume Index 59.91 mL/m2    IVS 1.70 (A) 0.6 - 1.1 cm    LVOT diameter 2.03 cm    LVOT area 3.2 cm2    FS 18 (A) 28 - 44 %    Left Ventricle Relative Wall Thickness 0.49 cm    Posterior Wall 1.35 (A) 0.6 - 1.1 cm    LV mass 382.20 g    LV Mass Index 155 g/m2    MV Peak E Guru 1.29 m/s    MV Peak A Guru 0.49 m/s    TR Max Guru 3.07 m/s    E/A ratio 2.63     E wave deceleration time 154.04 msec    LVOT peak guru 1.01 m/s    Left Ventricular Outflow Tract Mean Velocity 0.94 cm/s    Left Ventricular Outflow Tract Mean Gradient 3.53 mmHg    TAPSE 2.25 cm    LA size 5.05 cm    Left Atrium Minor Axis 7.53 cm    Left Atrium Major Axis 7.28 cm    RA Major Axis 5.96 cm    AV regurgitation pressure 1/2 time [id number] ms    AR Max Guru 2.83 m/s    AV mean gradient 8 mmHg    AV peak gradient 13 mmHg    Ao peak guru 1.81 m/s    Ao VTI 33.00 cm    LVOT peak VTI 18.90 cm    AV valve area 1.85 cm²    AV Velocity Ratio 0.56     AV index (prosthetic) " 0.57     ZENIA by Velocity Ratio 1.81 cm²    Mr max jay 4.90 m/s    MV mean gradient 4 mmHg    MV peak gradient 12 mmHg    MV stenosis pressure 1/2 time 44.67 ms    MV valve area p 1/2 method 4.93 cm2    MV valve area by continuity eq 1.66 cm2    MV VTI 36.9 cm    TV mean gradient 33 mmHg    Triscuspid Valve Regurgitation Peak Gradient 38 mmHg    Ao root annulus 3.51 cm    STJ 3.32 cm    Ascending aorta 3.26 cm    IVC diameter 2.72 cm    ZLVIDS -4.00     ZLVIDD -8.28     LA Volume Index 69.8 mL/m2    LA volume 171.60 cm3    LA WIDTH 5.4 cm    RA Width 3.8 cm    TV resting pulmonary artery pressure 41 mmHg    RV TB RVSP 6 mmHg    Est. RA pres 3 mmHg    Narrative      Left Ventricle: The left ventricle is severely dilated. Mildly   increased wall thickness. There is moderately reduced systolic function   with a visually estimated ejection fraction of 30 - 35%. There is   diastolic dysfunction.    Right Ventricle: Normal right ventricular cavity size. Wall thickness   is normal. Right ventricle wall motion  is normal. Systolic function is   normal.    Aortic Valve: There is mild aortic regurgitation.    Mitral Valve: There is severe regurgitation.    Pulmonary Artery: The estimated pulmonary artery systolic pressure is   41 mmHg.    IVC/SVC: Normal venous pressure at 3 mmHg.       Assessment and Plan:     STABLE CV STATUS TODAY.  CONTINUE CURRENT MEDS.  MONITOR BP.  TRANSFER TO FLOOR TODAY.  F/U LABS IN AM.  MAXIMIZE MED TX IN FUTURE FOR CAD/CHF AS TOLERATED.      * Acute on chronic congestive heart failure  Patient is identified as having Diastolic (HFpEF) heart failure that is Acute. CHF is currently uncontrolled due to Pulmonary edema/pleural effusion on CXR. Latest ECHO performed and demonstrates- No results found for this or any previous visit.  . Continue Furosemide and monitor clinical status closely. Monitor on telemetry. Patient is on CHF pathway.  Monitor strict Is&Os and daily weights.  Place on fluid  "restriction of 2 L. Cardiology has been consulted. Continue to stress to patient importance of self efficacy and  on diet for CHF. Last BNP reviewed- and noted below   No results for input(s): "BNP", "BNPTRIAGEBLO" in the last 168 hours.  Cw iv lasix     4/3/24  -TTE with EF of 20-25%, moderately reduced RV function, pulmonary HTN, severe MR  -Continue IV diuresis  -Continue BB  -Will add ARB vs Entresto pending creatinine/BP trend  -Strict I's/O's  -Probable R/LHC tmw pending reassessment    4.4.2024  Right and left heart catheterization today.    Lasix held due to drop in pressure yesterday evening.    Discussed risks and benefits with the family, son and daughter at bedside.    4/5/24  -More SOB this AM with cough/orthopnea  -IV Lasix x one dose  -Continue Aldactone, BB    4.6.2024  Continue with balloon pump for today.    We will get a central line.  Discussed with ICU staff.  Check CVP and mixed venous.    Lactic acid normal.    Continue with IV diuresis.     4/8/24  -IABP remains in place  -Continue IV diuresis for now  -Continue BB  -Will optimize regimen further post procedure     4/9/24  -Hold further IV diuresis  -Continue BB  -BNP pending, will attempt to switch to po Lasix and add Entresto tmw      On intra-aortic balloon pump assist  Continue today.    Placed yesterday after flash pulmonary edema in recovery post PCI.     4/8/24  -IABP remains in place, repeat [facility] with PCI of RCA planned today    4/9/24  -IABP d/c'd      Left bundle branch block  We will have him evaluated by EP for possible CRT.        BMI 39.0-39.9,adult  -Weight loss    Atrial flutter  -Currently in aflutter HR low 100's  -Contributing to volume status/cardiomyopathy  -Continue BB  -Start amiodarone drip  -Continue heparin drip  -Will need RODERICK/DCCV post ischemic workup as well as EP evaluation to discuss ablation in future    4.4.2024  RODERICK cardioversion today after heart catheterization.    4/5/24  -Remains in SR s/p " RODERICK/DCCV  -Amiodarone switched to po  -Continue BB  -Heparin gtt for now, will need Eliquis upon d/c    4.6.2024  BACK IN A FLUTTER THIS MORNING.  We will resume IV amiodarone.  Continue with p.o..    Add digoxin IV.    Continue with beta-blockers.    Continue with balloon pump for now.    He may need a repeat cardioversion if does not convert back to normal with medications.    Future plan for ablation as well.      4.7.2024  Dccv today   Family and patient agreeable     4/8/24  -Remains in SR s/p DCCV yesterday  -Continue amiodarone, BB, digoxin for now  -Continue heparin gtt, will need OAC prior to d/c    4/9/24  -Continue heparin gtt  -Continue amiodarone, BB  -EP consulted for ablation later this week      Orthopnea  -IV Lasix x one dose    Chest pain  Heparin iv   Trend trop   Echo   Aspirin   Will need ischemic work up prior to discharge  Cw toprol    4/3/24  -Stable, troponin flat  -Continue ASA, BB, statin, heparin gtt  -Probable LHC tmw pending reassessment of volume status    4/5/24  -Multivessel CAD on cath    Elevated troponin  -Flat  -Likely secondary to demand ischemia from new onset atrial flutter and combined CHF  -Continue ASA, BB, heparin gtt, statin  -Ischemic workup with LHC once better compensated    4/5/24  -s/p LHC which showed triple vessel disease  -Case discussed with CTS and patient/family, repeat LHC today with PCI. Dr. Hernandez explained all risks, benefits, and treatment alternatives. All questions answered. Patient agreeable with proceeding.     4/8/24  -See plan under CAD    Essential hypertension  -Continue BB  -Monitor BP trend    Coronary artery disease involving native coronary artery of native heart  04/06/2024.    Status post PCI to LAD and circumflex yesterday.    He will eventually need at this point PCI of his RCA  to help improve his mitral valve function    4/8/24  -Stable, CP free  -IABP remains in place  -Repeat LH with RCA intervention today by Dr. Mayers  -Continue  ASA, statin, Plavix, heparin gtt, Imdur, BB    4/9/24  -s/p [facility] with successful intervention of RCA  -IABP removed  -Patient denies CP/angina  -Continue ASA, statin, Plavix, heparin gtt, Imdur, BB        VTE Risk Mitigation (From admission, onward)           Ordered     apixaban tablet 5 mg  2 times daily         04/09/24 1323     Reason for No Pharmacological VTE Prophylaxis  Once        Comments: Heparin Infusion   Question:  Reasons:  Answer:  Physician Provided (leave comment)    04/02/24 1153     IP VTE HIGH RISK PATIENT  Once         04/02/24 1153     Place sequential compression device  Until discontinued         04/02/24 1153                    Clement Estrella MD  Cardiology  [facility] - [facility] ([facility])

## 2024-04-10 NOTE — ASSESSMENT & PLAN NOTE
Chronic, . Latest blood pressure and vitals reviewed-     Temp:  [97.8 °F (36.6 °C)-98.6 °F (37 °C)]   Pulse:  [57-70]   Resp:  [15-31]   BP: ()/(51-59)   SpO2:  [92 %-100 %] .   Home meds for hypertension were reviewed and noted below.   Hypertension Medications               metoprolol succinate (TOPROL-XL) 25 MG 24 hr tablet Take 1 tablet (25 mg total) by mouth once daily.    torsemide (DEMADEX) 20 MG Tab Take 1 tablet (20 mg total) by mouth 2 (two) times a day.            While in the hospital, will manage blood pressure as follows; Continue home antihypertensive regimen    Will utilize p.r.n. blood pressure medication only if patient's blood pressure greater than 160/100 and he develops symptoms such as worsening chest pain or shortness of breath.

## 2024-04-10 NOTE — ASSESSMENT & PLAN NOTE
- s/p RODERICK with DCCV and R/LHC 4/4 that revealed severe 3 vessel disease, discussions were had with CT surgery and Life Vest arranged  - s/p repeat LHC 4/5 with PCI to mid left circ x1 and mid LAD x1, placement of balloon pump; repeat cath again 4/8 for RCA intervention, stent x3  - Plavix, ASA, statin, BB  - Mgmt per cards  - IABP was removed 4/9

## 2024-04-10 NOTE — ASSESSMENT & PLAN NOTE
- History of combined HF (EF 30-35% with diastolic heart failure, severe mitral regurg and a pulmonary artery pressure 41)  - Successful diuresis this admit and is currently 20L negative since admission   - After heart catheterization on 04/05 patient had an episode of flash pulmonary edema with required BiPAP and diuresis, now on 2L NC, continue to wean as able  - Cards managing  - ASA, statin, plavix, BB, nitrate, lasix  - IABP removed 4/9; more comfortable on exam; hemodynamics stable

## 2024-04-10 NOTE — PLAN OF CARE
Nutrition Plan of Care:    Recommendations     1.  Patient to continue on recommended cardiac oral diet   2. Monitor labs and po intake   3. Collaboration wiht medical providers     Goals: Patient to consume >50% of estimated energy needs prior to RD follow up.  Nutrition Goal Status: progressing towards goal  Communication of RD Recs: other (comment) (poc)     Assessment and Plan     Nutrition Problem  Disease specific nutritional needs     Related to (etiology):   Excessive oral intake   Congestive heart failure      Signs and Symptoms (as evidenced by):   Edema, Hypertension, BMI: 34.22     Interventions/Recommendations (treatment strategy):  Collaboration with medical providers     Nutrition Diagnosis Status:   Gamal Ryan MS, RDN, LDN

## 2024-04-10 NOTE — CONSULTS
Asked to see patient for atrial flutter, left bundle branch block in the setting of systolic CHF by Dr Mayers  I reviewed the chart in detail including all relevant clinical notes, cardiac study reports, lab data and Xrays.  I have reviewed the actual images of all relevant ECG, rhythm, holter and long term monitoring tracings obtained during current hospitalization and in past records.   I have reviewed the actual images of all relevant CXRays.   I have directly evaluated all relevant data pertaining to any CIED.   Patient was then seen, interviewed and evaluated.     In short:  78 man  Diastolic HF, HTN, DVT, CAD. Presented to the Emergency Department for evaluation of CP   Turns out to have been in A Flutter with acute D CHF over chronic S CHF - EF 30-35 in sinus rhythm, 2025 when in flutter.  Since admission on 04/02/2024 patient has had multiple procedures to include RODERICK (after IV amiodarone load), a diagnostic coronary angiogram which revealed severe three-vessel disease and after he was declined by CV surgery for CABG, he underwent staged PCI of his LAD and circ system on 1 day and of his totally occluded RCA on 2nd day.  In between, patient crashed after the 1st intervention and required the implantation of an IABP.  The presenting atrial flutter was the typical, common type 1 atrial flutter (clockwise rotation) with atrial cycle length of 250 milliseconds and 2 to 1 AV block with left bundle branch block (QRS duration 142 milliseconds).  With amiodarone infusion the atrial cycle length prolonged to 300 milliseconds while all the other characteristics remained unchanged.  After an initial recurrence post his 1st cardioversion on 04/04/2024, he has been in normal sinus rhythm since a 2nd cardioversion on 4/7 2024    Patient is still recovering in ICU.  His heart failure is improving but he still slightly orthopneic.  He has also been bedridden and feels weak and tired.    Assessment:  Patient should benefit  from both RFA of the CTI and implantation of a CRT device.    Plan:  As patient is recovering from acute CHF and multiple procedures/recent cardiogenic shock, it would be prudent to delay further-now elective even though necessary-procedures.  We will plan to bring him back soon as an outpatient and start with the flutter management (RFA of the CTI) followed shortly thereafter by implantation of a CRT device.

## 2024-04-10 NOTE — PHYSICIAN QUERY
PT Name: Chucho Clark  MR #: [id number]     DOCUMENTATION CLARIFICATION      CDS/: Luisa Gutierrez RN             Contact information: [email or web address]   This form is a permanent document in the medical record.    Query Date: April 10, 2024    By submitting this query, we are merely seeking further clarification of documentation. Please utilize your independent clinical judgment when addressing the question(s) below.     The Medical Record contains the following:   Indicators   Supporting Clinical Findings Location in Medical Record   x Chest Pain, Angina Chest pain Consult 4/2       Cardiology          x Coronary Artery Disease 4/5/24  -Multivessel CAD on cath PN 4/5       Cardiology     x EKG Atrial flutter with variable A-V block  Nonspecific intraventricular block  Abnormal ECG EKG 4/3   x Troponin  04/02/24 08:17 04/02/24 11:51 04/02/24 17:37   Troponin I 0.064 (H) 0.054 (H) 0.064 (H)    Labs    Echo Results     x Angiography   The Mid RCA to Dist RCA lesion was 100% stenosed.    The Mid Cx lesion was 90% stenosed.    The 1st Diag lesion was 85% stenosed.    The Prox LAD to Mid LAD lesion was 85% stenosed.    The RPAV lesion was 99% stenosed.    The ejection fraction was calculated to be 30%.    There was severe left ventricular systolic dysfunction.    The pre-procedure left ventricular end diastolic pressure was 20.    The post-procedure left ventricular end diastolic pressure was 21.    The estimated blood loss was none.    There was three vessel coronary artery disease.    The filling pressures on the right and left were mildly elevated. Pulmonary hypertension was mild.    There was moderate (3+) mitral regurgitation.    CT surgery evaluation, if declined will offer PCI to LAD and circumflex   Cath 4/4   x Documentation of acute cardiac condition Elevated troponin     --trend trop  --Cont diuresis      Elevated troponin  -Flat  -Likely secondary to demand ischemia from new onset atrial flutter  and combined CHF  -Continue ASA, BB, heparin gtt, statin  -Ischemic workup with LHC once better compensated       PN 4/3      Hosp Med          PN 4/3       Cardiology   x Medication/Treatment Heparin iv  Trend trop  Echo  Aspirin  Will need ischemic work up prior to discharge  Cw toprol Consult 4/2       Cardiology    Other        Provider, please clarify the  Cardiac Condition diagnosis related to the above documentation:  [ X  ] Demand Ischemia without Acute Myocardial Infarction     [   ] Elevated troponin only (without corresponding diagnosis)     [   ] Other Cardiac Diagnosis (please specify): ______________         Please document in your progress notes daily for the duration of treatment until resolved, and include in your discharge summary.    Form No. [id number]

## 2024-04-10 NOTE — PLAN OF CARE
No acute events overnight. VSS. PRN treatment of pain per order, see MAR. POC reviewed with patient. Safety and fall precautions maintained.

## 2024-04-10 NOTE — PROGRESS NOTES
[facility] - Intensive Care ([facility])  Adult Nutrition  Progress Note    SUMMARY       Recommendations    1.  Patient to continue on recommended cardiac oral diet   2. Monitor labs and po intake   3. Collaboration wiht medical providers    Goals: Patient to consume >50% of estimated energy needs prior to RD follow up.  Nutrition Goal Status: progressing towards goal  Communication of RD Recs: other (comment) (poc)    Assessment and Plan    Nutrition Problem  Disease specific nutritional needs    Related to (etiology):   Excessive oral intake   Congestive heart failure     Signs and Symptoms (as evidenced by):   Edema, Hypertension, BMI: 34.22    Interventions/Recommendations (treatment strategy):  Collaboration with medical providers    Nutrition Diagnosis Status:   New         Malnutrition Assessment     Musculoskeletal/Lower Extremities: edema                                 Reason for Assessment    Reason For Assessment: RD follow-up    Diagnosis: cardiac disease  Patient Active Problem List   Diagnosis    Atherosclerosis of aorta    Coronary artery disease involving native coronary artery of native heart    Chronic deep vein thrombosis (DVT) of distal vein of left lower extremity    Essential hypertension    Lymphedema    Venous stasis dermatitis of both lower extremities    Venous insufficiency    Grade II diastolic dysfunction    Morbid obesity with BMI of 45.0-49.9, adult    Meralgia paraesthetica, right    Acute on chronic congestive heart failure    Elevated troponin    Chest pain    Orthopnea    Atrial flutter    BMI 39.0-39.9,adult    Left bundle branch block    On intra-aortic balloon pump assist       Relevant Medical History: .  Past Medical History:   Diagnosis Date    CHF (congestive heart failure)     DVT (deep venous thrombosis)     Hypertension     Renal disorder      Interdisciplinary Rounds: did not attend (RD remote)    General Information Comments:   4/10/2024: Patient is on a cardiac diet with  "fair intake at this time.  Meal consumption noted at 50%.  Patient with BMI: 34.22.  Admitted with CHF.  +4 edema noted on admit.  Labs reviewed.  [facility].  LBM: 4/5/2024.  RD to continue to monitor and follow.    Nutrition Discharge Planning: Patient to continue on recommended cardiac diet post discharge    Nutrition Risk Screen    Nutrition Risk Screen: no indicators present  Nutrition Related Social Determinants of Health: SDOH: None Identified    Nutrition/Diet History    Spiritual, Cultural Beliefs, Yarsani Practices, Values that Affect Care: no  Food Allergies: NK    Anthropometrics    Temp: 98.4 °F (36.9 °C)  Height Method: Stated  Height: 5' 11" (180.3 cm)  Height (inches): 71 in  Weight Method: Bed Scale  Weight: 111.3 kg (245 lb 6 oz)  Weight (lb): 245.37 lb  Ideal Body Weight (IBW), Male: 172 lb  % Ideal Body Weight, Male (lb): 142.66 %  BMI (Calculated): 34.2  BMI Grade: 30 - 34.9- obesity - grade I       Lab/Procedures/Meds    Pertinent Labs Reviewed: reviewed  BMP  Lab Results   Component Value Date     (L) 04/10/2024    K 4.2 04/10/2024    CL 96 04/10/2024    CO2 31 (H) 04/10/2024    BUN 27 (H) 04/10/2024    CREATININE 1.0 04/10/2024    CALCIUM 8.4 (L) 04/10/2024    ANIONGAP 6 (L) 04/10/2024    EGFRNORACEVR >60 04/10/2024     Lab Results   Component Value Date    HGBA1C 5.2 04/02/2024     Lab Results   Component Value Date    CALCIUM 8.4 (L) 04/10/2024    PHOS 3.3 04/10/2024       Pertinent Medications Reviewed: reviewed  Scheduled Meds:   amiodarone  200 mg Oral BID    apixaban  5 mg Oral BID    aspirin  81 mg Oral Daily    atorvastatin  40 mg Oral Daily    clopidogreL  75 mg Oral Daily    famotidine  20 mg Oral BID    isosorbide mononitrate  30 mg Oral Daily    metoprolol tartrate  25 mg Oral BID    mupirocin   Nasal BID    polyethylene glycol  17 g Oral Daily    sodium chloride 0.9%  10 mL Intravenous Q6H     Continuous Infusions:   sodium chloride 0.9%      sodium chloride 0.9%       PRN " Meds:.sodium chloride 0.9%, sodium chloride 0.9%, acetaminophen, ALPRAZolam, aluminum-magnesium hydroxide-simethicone, benzonatate, bisacodyL, dextrose 10%, dextrose 10%, glucagon (human recombinant), glucose, glucose, HYDROcodone-acetaminophen, influenza 65up-adj, magnesium oxide, magnesium oxide, melatonin, morphine, naloxone, nitroGLYCERIN, ondansetron, ondansetron, ondansetron, pneumoc 20-mojgan conj-dip cr(PF), potassium bicarbonate, potassium bicarbonate, potassium bicarbonate, senna-docusate 8.6-50 mg, sodium chloride 0.9%, sodium chloride 0.9%, Flushing PICC/Midline Protocol **AND** sodium chloride 0.9% **AND** sodium chloride 0.9%    Physical Findings/Assessment         Estimated/Assessed Needs    Weight Used For Calorie Calculations: 111.3 kg (245 lb 6 oz)  Energy Calorie Requirements (kcal): 20kcals/kg (2226kcals/day)  Energy Need Method: Kcal/kg  Protein Requirements: 0.8-1.0g/kg (89-111g/day)  Weight Used For Protein Calculations: 111.3 kg (245 lb 6 oz)  Fluid Requirements (mL): 1ml/kcal  Estimated Fluid Requirement Method: RDA Method  RDA Method (mL): 20  CHO Requirement: 250      Nutrition Prescription Ordered    Current Diet Order: Cardiac    Evaluation of Received Nutrient/Fluid Intake    % Kcal Needs: 50%  % Protein Needs: 50%  I/O: 4/10/2024: [id number]  Energy Calories Required: meeting needs  Protein Required: meeting needs  Fluid Required: meeting needs  Comments: LBM: 4/5/2024  Tolerance: tolerating  % Intake of Estimated Energy Needs: 50 - 75 %  % Meal Intake: 50 - 75 %    Nutrition Risk    Level of Risk/Frequency of Follow-up: moderate (Follow up: 1-2x per week)     Monitor and Evaluation    Food and Nutrient Intake: energy intake, food and beverage intake  Food and Nutrient Adminstration: diet order  Knowledge/Beliefs/Attitudes: beliefs and attitudes  Physical Activity and Function: factors affecting access to physical activity, nutrition-related ADLs and IADLs  Anthropometric Measurements:  height/length, weight, weight change, body mass index  Biochemical Data, Medical Tests and Procedures: electrolyte and renal panel, gastrointestinal profile, glucose/endocrine profile, inflammatory profile, lipid profile     Nutrition Follow-Up    RD Follow-up?: Yes    Joyce Ryan MS, RDN, LDN

## 2024-04-10 NOTE — ASSESSMENT & PLAN NOTE
Patient is identified as having Combined Systolic and Diastolic heart failure that is Acute on chronic. CHF is currently uncontrolled due to >3 pillow orthopnea and Rales/crackles on pulmonary exam. Latest ECHO performed and demonstrates- Results for orders placed during the hospital encounter of 04/02/24    Echo Saline Bubble? No    Interpretation Summary    Left Ventricle: The left ventricle is moderately dilated. Mildly increased wall thickness. There is severely reduced systolic function with a visually estimated ejection fraction of 20 - 25%. Grade II diastolic dysfunction.    Right Ventricle: Normal right ventricular cavity size. Wall thickness is normal. Right ventricle wall motion  is normal. Systolic function is moderately reduced.    Left Atrium: Left atrium is severely dilated.    Aortic Valve: There is moderate aortic valve sclerosis. There is mild aortic regurgitation.    Mitral Valve: There is severe regurgitation.    Tricuspid Valve: There is mild regurgitation.    Pulmonary Artery: The estimated pulmonary artery systolic pressure is 45 mmHg.    IVC/SVC: Elevated venous pressure at 15 mmHg.  . Continue Beta Blocker and Furosemide and monitor clinical status closely. Monitor on telemetry. Patient is on CHF pathway.  Monitor strict Is&Os and daily weights.  Place on fluid restriction of 1.5 L. Cardiology has been consulted. Continue to stress to patient importance of self efficacy and  on diet for CHF. Last BNP reviewed- and noted below   Recent Labs   Lab 04/09/24  1335   *       -continue diuretic

## 2024-04-10 NOTE — PROGRESS NOTES
[facility] - Intensive Care ([facility]  Progress Note    Patient Name: Chucho Clark  MRN: [id number]  Patient Class: IP- Inpatient   Admission Date: 4/2/2024  Length of Stay: 7 days  Attending Physician: Wilder Friedman MD  Primary Care Provider: Ale, Primary Doctor        Subjective:     Principal Problem:Acute on chronic congestive heart failure        HPI:  78 y.o. male patient, PMHx: Diastolic HF, HTN, DVT, CAD. Presented to the Emergency Department for evaluation of CP which onset night PTA. Pt visited Dr. Corrales (Cardiology) for the first time on 4/1/24 and was initiated on Eliquis. Pt notes that he also felt some upper abd pain PTA that felt like acid reflux. Pt and son originally thought sxs were cold/flu sxs, but tested negative in the clinic. Symptoms are constant and moderate in severity. No mitigating or exacerbating factors reported. Associated sxs include SOB, palpitations, and increased HR. Patient denies any cough, numbness, HA, fever, n/v/d, and all other sxs at this time. Pt has not had a stress test. In the ED, vitals: 130/69, 121, 22, 97.8F, 95% RA. Significant Labs: BNP: 560, Trop: 0.064, Bili: 1.1. CXR: interstitial edema. EKG: Neg for STEMI. Cardiology consulted in ED. Treated with ASA, Nitro, BB, diuretic. Initiated on Heparin Infusion. Patient is a full code. Placed in observation under the care of Hospital Medicine for management of elevated troponin, ACS rule out.     Overview/Hospital Course:  The patient presented with chest pain and shortness of breath. He reported he had been unable to lay flat for 2 weeks prior to presentation. He was noted to have bilateral LE edema as well. Workup revealed elevated troponin and volume overload. He was placed on IV diuretics. Cardiology was consulted. Echo revealed reduced EF. He diuresed well. Left heart cath revealed severe triple vessel disease. He underwent RODERICK/DCCV and sinus rhythm restored. The patient went for repeat LHC 4/5.   He had PCI to the mid left circ x1 in the mid LAD x1.  He developed flash pulmonary edema required noninvasive ventilation as well as diuretics.  He was placed on the balloon pump and transferred back to ICU.  Subsequently went back into AFib and had repeat cardioversion on 04/07.  Cardiac catheterization on 04/08 with 3 stents placed and balloon pump removed.  Patient has been taken off his heparin drip is feeling well able to sit in the bedside chair with no shortness a breath or chest pain.  He was deemed stable to transitioned to telemetry floor and Hospital Medicine was consulted to assume care.    Interval History:  Patient seen examined with family at bedside.  Reports that he is sitting in the chair all day and feeling much better.  Ann catheter was Dc'ed and he has thus far had trouble urinating.  Also complains of constipation and no BM for the past 4-5 days.  Patient complains right hip pain making it difficult to ambulate.    Review of Systems   Constitutional:  Positive for activity change, appetite change and fatigue.   Respiratory:  Negative for cough, choking, chest tightness and shortness of breath.    Cardiovascular:  Negative for chest pain, palpitations and leg swelling.        Chronic venous stasis bilateral lower extremities   Gastrointestinal:  Positive for constipation.   Genitourinary:  Positive for difficulty urinating.   Musculoskeletal:  Positive for arthralgias. Back pain: Right hip.  Skin:  Positive for color change (bilateral lower extremities).   Neurological:  Positive for weakness.   All other systems reviewed and are negative.    Objective:     Vital Signs (Most Recent):  Temp: 97.8 °F (36.6 °C) (04/10/24 1511)  Pulse: 63 (04/10/24 1700)  Resp: (!) 30 (04/10/24 1700)  BP: (!) 99/54 (04/10/24 1700)  SpO2: 98 % (04/10/24 1700) Vital Signs (24h Range):  Temp:  [97.8 °F (36.6 °C)-98.6 °F (37 °C)] 97.8 °F (36.6 °C)  Pulse:  [57-70] 63  Resp:  [15-31] 30  SpO2:  [92 %-100 %] 98 %  BP:  ()/(51-59) 99/54     Weight: 111.3 kg (245 lb 6 oz)  Body mass index is 34.22 kg/m².    Intake/Output Summary (Last 24 hours) at 4/10/2024 1718  Last data filed at 4/10/2024 1200  Gross per 24 hour   Intake 493 ml   Output 1020 ml   Net -527 ml         Physical Exam  Vitals reviewed.   Constitutional:       Appearance: Normal appearance. He is obese.   HENT:      Head: Normocephalic and atraumatic.      Mouth/Throat:      Mouth: Mucous membranes are moist.      Pharynx: Oropharynx is clear.   Eyes:      Extraocular Movements: Extraocular movements intact.      Conjunctiva/sclera: Conjunctivae normal.   Cardiovascular:      Rate and Rhythm: Regular rhythm. Bradycardia present.      Pulses: Normal pulses.      Heart sounds: Normal heart sounds.   Pulmonary:      Effort: Pulmonary effort is normal.      Breath sounds: Normal breath sounds.   Abdominal:      General: Bowel sounds are normal.      Palpations: Abdomen is soft.   Musculoskeletal:         General: Normal range of motion.      Cervical back: Normal range of motion and neck supple.   Skin:     General: Skin is warm and dry.      Comments: Chronic lower extremity venous stasis changes with color changes.  No pitting edema   Neurological:      General: No focal deficit present.      Mental Status: He is alert and oriented to person, place, and time. Mental status is at baseline.   Psychiatric:         Mood and Affect: Mood normal.         Behavior: Behavior normal.         Thought Content: Thought content normal.             Significant Labs: All pertinent labs within the past 24 hours have been reviewed.  CBC:   Recent Labs   Lab 04/09/24  0441 04/10/24  0434   WBC 4.66 4.83   HGB 11.0* 10.6*   HCT 33.5* 32.4*   * 151     CMP:   Recent Labs   Lab 04/09/24  0441 04/10/24  0434   * 133*   K 4.0 4.2   CL 96 96   CO2 30* 31*   GLU 93 92   BUN 23 27*   CREATININE 1.0 1.0   CALCIUM 8.4* 8.4*   ANIONGAP 8 6*       Significant Imaging: I have  reviewed all pertinent imaging results/findings within the past 24 hours.    Assessment/Plan:      * Acute on chronic congestive heart failure  Patient is identified as having Combined Systolic and Diastolic heart failure that is Acute on chronic. CHF is currently uncontrolled due to >3 pillow orthopnea and Rales/crackles on pulmonary exam. Latest ECHO performed and demonstrates- Results for orders placed during the hospital encounter of 04/02/24    Echo Saline Bubble? No    Interpretation Summary    Left Ventricle: The left ventricle is moderately dilated. Mildly increased wall thickness. There is severely reduced systolic function with a visually estimated ejection fraction of 20 - 25%. Grade II diastolic dysfunction.    Right Ventricle: Normal right ventricular cavity size. Wall thickness is normal. Right ventricle wall motion  is normal. Systolic function is moderately reduced.    Left Atrium: Left atrium is severely dilated.    Aortic Valve: There is moderate aortic valve sclerosis. There is mild aortic regurgitation.    Mitral Valve: There is severe regurgitation.    Tricuspid Valve: There is mild regurgitation.    Pulmonary Artery: The estimated pulmonary artery systolic pressure is 45 mmHg.    IVC/SVC: Elevated venous pressure at 15 mmHg.  . Continue Beta Blocker and Furosemide and monitor clinical status closely. Monitor on telemetry. Patient is on CHF pathway.  Monitor strict Is&Os and daily weights.  Place on fluid restriction of 1.5 L. Cardiology has been consulted. Continue to stress to patient importance of self efficacy and  on diet for CHF. Last BNP reviewed- and noted below   Recent Labs   Lab 04/09/24  1335   *       -continue diuretic        Hip pain, acute, right  Likely secondary cardiac catheterization and immobility  Start Tylenol scheduled  Check hip x-ray    Left bundle branch block        BMI 39.0-39.9,adult        Atrial flutter  S/p RODERICK/DCCV      Orthopnea  --Continue  diuresis    Chest pain  -continue diuresis  -currently chest pain free  Status post 5 stent placements      Elevated troponin  Status post heart catheterization with stent placement        Venous stasis dermatitis of both lower extremities  Chronic venous stasis. Worsened appearance of BLE, likely secondary to inability to elevate legs over the past 2 weeks due to orthopnea.     --Cont diuresis      Essential hypertension  Chronic, . Latest blood pressure and vitals reviewed-     Temp:  [97.8 °F (36.6 °C)-98.6 °F (37 °C)]   Pulse:  [57-70]   Resp:  [15-31]   BP: ()/(51-59)   SpO2:  [92 %-100 %] .   Home meds for hypertension were reviewed and noted below.   Hypertension Medications               metoprolol succinate (TOPROL-XL) 25 MG 24 hr tablet Take 1 tablet (25 mg total) by mouth once daily.    torsemide (DEMADEX) 20 MG Tab Take 1 tablet (20 mg total) by mouth 2 (two) times a day.            While in the hospital, will manage blood pressure as follows; Continue home antihypertensive regimen    Will utilize p.r.n. blood pressure medication only if patient's blood pressure greater than 160/100 and he develops symptoms such as worsening chest pain or shortness of breath.    Coronary artery disease involving native coronary artery of native heart  Patient with known CAD s/p stent placement , which is controlled Will continue ASA, Plavix, and Statin and monitor for S/Sx of angina/ACS. Continue to monitor on telemetry.       VTE Risk Mitigation (From admission, onward)           Ordered     apixaban tablet 5 mg  2 times daily         04/09/24 1323     Reason for No Pharmacological VTE Prophylaxis  Once        Comments: Heparin Infusion   Question:  Reasons:  Answer:  Physician Provided (leave comment)    04/02/24 1153     IP VTE HIGH RISK PATIENT  Once         04/02/24 1153     Place sequential compression device  Until discontinued         04/02/24 1153                    Discharge Planning   ZOEY:      Code  Status: Full Code   Is the patient medically ready for discharge?:     Reason for patient still in hospital (select all that apply): Patient trending condition, Laboratory test, Treatment, Imaging, and Consult recommendations  Discharge Plan A: Home Health                Rosalina Santillan MD  Department of Hospital Medicine   [facility] - Intensive Care ([facility])

## 2024-04-10 NOTE — PLAN OF CARE
PT EVAL complete. Required MOD A for STS, unable to progress gait due to R LE pain. Recommendation TBD pending progress.

## 2024-04-10 NOTE — ASSESSMENT & PLAN NOTE
Patient with known CAD s/p stent placement , which is controlled Will continue ASA, Plavix, and Statin and monitor for S/Sx of angina/ACS. Continue to monitor on telemetry.

## 2024-04-10 NOTE — SUBJECTIVE & OBJECTIVE
Objective:     Vital Signs (Most Recent):  Temp: 98.4 °F (36.9 °C) (04/10/24 0715)  Pulse: 66 (04/10/24 0700)  Resp: 18 (04/10/24 0700)  BP: (!) 122/58 (04/10/24 0700)  SpO2: (!) 92 % (04/10/24 0700) Vital Signs (24h Range):  Temp:  [97.6 °F (36.4 °C)-98.4 °F (36.9 °C)] 98.4 °F (36.9 °C)  Pulse:  [59-70] 66  Resp:  [15-24] 18  SpO2:  [92 %-100 %] 92 %  BP: ()/(51-66) 122/58     Weight: 111.3 kg (245 lb 6 oz)  Body mass index is 34.22 kg/m².  Intake/Output Summary (Last 24 hours) at 4/10/2024 0828  Last data filed at 4/10/2024 0700  Gross per 24 hour   Intake 237 ml   Output 1320 ml   Net -1083 ml     Physical Exam  Constitutional:       General: He is not in acute distress.     Appearance: He is obese. He is not ill-appearing.   HENT:      Head: Normocephalic.   Eyes:      Extraocular Movements: Extraocular movements intact.      Pupils: Pupils are equal, round, and reactive to light.   Cardiovascular:      Rate and Rhythm: Normal rate and regular rhythm.      Pulses: Normal pulses.      Heart sounds: Murmur heard.   Abdominal:      General: Bowel sounds are normal. There is no distension.      Palpations: Abdomen is soft.      Tenderness: There is no abdominal tenderness.   Musculoskeletal:      Cervical back: Neck supple.      Right lower leg: Edema (w/ brawny skin color changes) present.      Left lower leg: Edema present.   Skin:     General: Skin is warm.      Capillary Refill: Capillary refill takes 2 to 3 seconds.      Coloration: Skin is not jaundiced.   Neurological:      Mental Status: He is alert and oriented to person, place, and time.   Psychiatric:         Mood and Affect: Mood normal.         Behavior: Behavior normal.         Thought Content: Thought content normal.         Judgment: Judgment normal.      Review of Systems   Constitutional:  Negative for chills, fatigue and fever.   Respiratory:  Negative for chest tightness and shortness of breath.    Cardiovascular:  Positive for leg  swelling. Negative for chest pain and palpitations.   Gastrointestinal:  Negative for abdominal pain, nausea and vomiting.   Genitourinary:  Negative for difficulty urinating and dysuria.   Musculoskeletal:  Positive for myalgias.   Neurological:  Negative for dizziness, weakness and headaches.   Psychiatric/Behavioral:  Negative for agitation and confusion.      Vents:  Oxygen Concentration (%): 28 (04/08/24 0711)    Lines/Drains/Airways       Peripherally Inserted Central Catheter Line  Duration             PICC Double Lumen 04/06/24 1325 right basilic 3 days              Drain  Duration                  Urethral Catheter 04/05/24 1600 4 days                  Significant Labs:    CBC/Anemia Profile:  Recent Labs   Lab 04/09/24  0441 04/10/24  0434   WBC 4.66 4.83   HGB 11.0* 10.6*   HCT 33.5* 32.4*   * 151   MCV 97 96   RDW 13.2 12.9     Chemistries:  Recent Labs   Lab 04/09/24  0441 04/10/24  0434   * 133*   K 4.0 4.2   CL 96 96   CO2 30* 31*   BUN 23 27*   CREATININE 1.0 1.0   CALCIUM 8.4* 8.4*   MG 2.0 2.0   PHOS 3.7 3.3     Significant Imaging:  No new imaging

## 2024-04-10 NOTE — PROGRESS NOTES
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  Of note, patient had a first-time visit with Cardiology (Dr. Corrales) on 04/1 and was initiated on Eliquis for a flutter and was stated on metoprolol. In the ED, vitals: 130/69, 121, 22, 97.8F, 95% RA. Significant Labs: BNP: 560, Trop: 0.064, Bili: 1.1. CXR: interstitial edema. EKG: Neg for STEMI. Cardiology consulted in ED. Treated with ASA, Nitro, BB, diuretic. Initiated on Heparin Infusion. Placed was observation under the care of Hospital Medicine for management of elevated troponin, ACS rule out.  During hospital stay patient underwent diuresis and is-6.5 L as of 4/5.  On 04/04 patient was taken to the cath lab and underwent RODERICK with DCCV as well as a left-to-right heart catheterization which revealed severe three-vessel disease and a consultation by CT surgery was recommended; per documentation it appears discussions were had an incision was made to take the patient back to the cath lab on 04/05 were underwent another C with PCI to the mid left circ x1 in the mid LAD x1.  In the PACU patient had an episode of flash pulmonary edema for which he required NIPPV as well as diuretics.  Balloon pump was also placed prior to transfer to the ICU. It also appears that life vest is being arranged for discharge.     Hospital/ICU Course:  4/6: No acute events overnight, pt awake and  alert, on 2L NC, denies CP or SOB or any other issues, just wants to be able to move his right leg  4/7: No acute events, a fib on monitor with controlled rate, remains with IABP in place, on NC in NAD  4/8: Denies any chst pain/SOB. Complains of irritability to R thigh and difficulty getting in comfortable position. Plan for cath lab again today with Dr. Mayers.   4/9: Went to cath lab yesterday and stent x3. IABP pulled this am. Patient feels a lot more comfortable. Denies any chest pain/shortness of breath.   4/10: NAEON. Doing well. Sitting up in bedside chair. Denies any chest pain/shortness of breath.     Objective:     Vital Signs (Most Recent):  Temp: 98.4 °F (36.9 °C) (04/10/24 0715)  Pulse: 66 (04/10/24 0700)  Resp: 18 (04/10/24 0700)  BP: (!) 122/58 (04/10/24 0700)  SpO2: (!) 92 % (04/10/24 0700) Vital Signs (24h Range):  Temp:  [97.6 °F (36.4 °C)-98.4 °F (36.9 °C)] 98.4 °F (36.9 °C)  Pulse:  [59-70] 66  Resp:  [15-24] 18  SpO2:  [92 %-100 %] 92 %  BP: ()/(51-66) 122/58     Weight: 111.3 kg (245 lb 6 oz)  Body mass index is 34.22 kg/m².  Intake/Output Summary (Last 24 hours) at 4/10/2024 0828  Last data filed at 4/10/2024 0700  Gross per 24 hour   Intake 237 ml   Output 1320 ml   Net -1083 ml     Physical Exam  Constitutional:       General: He is not in acute distress.     Appearance: He is obese. He is not ill-appearing.   HENT:      Head: Normocephalic.   Eyes:      Extraocular Movements: Extraocular movements intact.      Pupils: Pupils are equal, round, and reactive to light.   Cardiovascular:      Rate and Rhythm: Normal rate and regular rhythm.      Pulses: Normal pulses.      Heart sounds: Murmur heard.   Abdominal:      General: Bowel sounds are normal. There is no distension.      Palpations: Abdomen is soft.      Tenderness: There is no abdominal tenderness.   Musculoskeletal:      Cervical back: Neck supple.      Right lower leg: Edema (w/ brawny skin color changes) present.      Left  lower leg: Edema present.   Skin:     General: Skin is warm.      Capillary Refill: Capillary refill takes 2 to 3 seconds.      Coloration: Skin is not jaundiced.   Neurological:      Mental Status: He is alert and oriented to person, place, and time.   Psychiatric:         Mood and Affect: Mood normal.         Behavior: Behavior normal.         Thought Content: Thought content normal.         Judgment: Judgment normal.      Review of Systems   Constitutional:  Negative for chills, fatigue and fever.   Respiratory:  Negative for chest tightness and shortness of breath.    Cardiovascular:  Positive for leg swelling. Negative for chest pain and palpitations.   Gastrointestinal:  Negative for abdominal pain, nausea and vomiting.   Genitourinary:  Negative for difficulty urinating and dysuria.   Musculoskeletal:  Positive for myalgias.   Neurological:  Negative for dizziness, weakness and headaches.   Psychiatric/Behavioral:  Negative for agitation and confusion.      Vents:  Oxygen Concentration (%): 28 (04/08/24 0711)    Lines/Drains/Airways       Peripherally Inserted Central Catheter Line  Duration             PICC Double Lumen 04/06/24 1325 right basilic 3 days              Drain  Duration                  Urethral Catheter 04/05/24 1600 4 days                  Significant Labs:    CBC/Anemia Profile:  Recent Labs   Lab 04/09/24  0441 04/10/24  0434   WBC 4.66 4.83   HGB 11.0* 10.6*   HCT 33.5* 32.4*   * 151   MCV 97 96   RDW 13.2 12.9     Chemistries:  Recent Labs   Lab 04/09/24  0441 04/10/24  0434   * 133*   K 4.0 4.2   CL 96 96   CO2 30* 31*   BUN 23 27*   CREATININE 1.0 1.0   CALCIUM 8.4* 8.4*   MG 2.0 2.0   PHOS 3.7 3.3     Significant Imaging:  No new imaging     ABG  Recent Labs   Lab 04/07/24  2214   PH 7.423   PO2 32*   PCO2 52.1*   HCO3 34.1*   BE 10*     Assessment/Plan:     Cardiac/Vascular  * Acute on chronic congestive heart failure  - History of combined HF (EF 30-35% with diastolic  heart failure, severe mitral regurg and a pulmonary artery pressure 41)  - Successful diuresis this admit and is currently 20L negative since admission   - After heart catheterization on 04/05 patient had an episode of flash pulmonary edema with required BiPAP and diuresis, now on 2L NC, continue to wean as able  - Cards managing  - ASA, statin, plavix, BB, nitrate, lasix  - IABP removed 4/9; more comfortable on exam; hemodynamics stable     On intra-aortic balloon pump assist  - Removed 4/9    Atrial flutter  - s/p RODERICK and DCCV 4/4, repeat DCCV 4/7 per cards  - On amio and BB; Digoxin d/c'ed  - ASA, eliquis, plavix; Heparin gtt d/c'ed after IABP removed  - Patient NSR on monitor with occasional PVCs; HR controlled  - EP has been consulted per cards    Chest pain  - Improved; see plan for CHF/CAD    Elevated troponin  - see plan for CAD    Venous stasis dermatitis of both lower extremities  - Diuresis, elevate extremities, supportive care as outlined elsewhere   - Would benefit from vascular op f/u    Essential hypertension  - Mgmt per cards  - Currently on BB, nitrate, lasix  - Trend hemodynamics and adjust meds as needed; BP is stable     Coronary artery disease involving native coronary artery of native heart  - s/p RODERICK with DCCV and R/LHC 4/4 that revealed severe 3 vessel disease, discussions were had with CT surgery and Life Vest arranged  - s/p repeat LHC 4/5 with PCI to mid left circ x1 and mid LAD x1, placement of balloon pump; repeat cath again 4/8 for RCA intervention, stent x3  - Plavix, ASA, statin, BB  - Mgmt per cards  - IABP was removed 4/9    Endocrine  BMI 39.0-39.9,adult  - Affecting medical decision making and complicating the above   - Pt would benefit greatly from weight loss and lifestyle modifications      Clifford Lo PA-C  Critical Care Medicine  [facility] ([facility])

## 2024-04-10 NOTE — PT/OT/SLP EVAL
Physical Therapy Evaluation and Treatment    Patient Name: Chucho Clark   MRN: [id number]  Recent Surgery: Procedure(s) (LRB):  Percutaneous coronary intervention- RCA/  (N/A)  Repair, Chronic Total Occlusion, Coronary  Stent, Drug Eluting, Multi Vessel, Coronary  Thrombectomy, Coronary 2 Days Post-Op    Recommendations:     Discharge Recommendations:  (TBD pending progress)   Discharge Equipment Recommendations: walker, rolling, bedside commode   Barriers to discharge: Decreased caregiver support    Assessment:     Chucho Clark is a 78 y.o. male admitted with a medical diagnosis of Acute on chronic congestive heart failure. He presents with the following impairments/functional limitations: weakness, impaired endurance, impaired functional mobility, gait instability, impaired balance, pain, decreased safety awareness, decreased lower extremity function, decreased ROM, impaired cardiopulmonary response to activity.    The mobility limitation cannot be sufficiently resolved by the use of a cane.   Patient's functional mobility deficit can be sufficiently resolved with the use of a rolling walker. Patient's mobility limitation significantly impairs their ability to participate in one of more activities of daily living. The use of a rolling walker will significantly improve the patient's ability to participate in MRADLS and the patient will use it on regular basis in the home.     This patient would BENEFIT  from a bedside commode, because they are minimally ambulatory and lack the endurance due to their current medical diagnosis, to ambulate as far as required to their usual toilet facility.    Rehab Prognosis: Good; patient would benefit from acute PT services to address these deficits and reach maximum level of function.    Plan:     During this hospitalization, patient to be seen 3 x/week to address the above listed problems via gait training, therapeutic activities, therapeutic exercises    Plan of Care Expires:  04/24/24    Subjective     Chief Complaint: Pt is motivated to participate  Patient Comments/Goals: none stated  Pain/Comfort:  Pain Rating 1: 6/10  Location - Side 1: Right  Location - Orientation 1: generalized  Location 1: gluteal  Pain Addressed 1: Reposition, Distraction, Cessation of Activity  Pain Rating Post-Intervention 1: 6/10    Social History:  Living Environment: Patient lives alone in a 2 story home with number of outside stair(s): 0 and bed/bath on 1st floor. Reports his son will be staying with him but his son works during the day.   Prior Level of Function: Prior to admission, patient was independent, driving and working, and ambulated household and community distances using no AD  Equipment Used at Home: none  DME owned (not currently used): shower chair  Assistance Upon Discharge: family    Objective:     Communicated with nurse Sharma and epic chart review prior to session. Patient found up in chair with pulse ox (continuous), telemetry, blood pressure cuff, PICC line, oxygen upon PT entry to room.    General Precautions: Standard, fall   Orthopedic Precautions: N/A   Braces: N/A    Respiratory Status: Nasal cannula, flow 4 L/min    Exams:  Cognition: Patient is oriented to Person, Place, Time, Situation  RLE ROM: WFL  RLE Strength:  Grossly 4-/5  LLE ROM: WFL  LLE Strength:  Grossly 4/5  Sensation:    -       Intact  Skin Integrity/Edema:     -       Skin integrity: Visible skin intact    Functional Mobility:  Gait belt applied - Yes  Bed Mobility  Seated in chair at start of session and returned to chair  Transfers  Sit to Stand: moderate assistance with rolling walker  Gait  Attempted but pt unable due to increased pain in R LE with WB. Will progress as able.   Balance  Sitting: contact guard assistance  Standing: minimum assistance, tolerated standing ~5min    Therapeutic Activities and Exercises:   Pt educated on role of PT in acute care and POC. Educated on importance of OOB activities,  "activity pacing, and HEP (marching/hip flex, hip abd, heel slides/LAQ, quad sets, ankle pumps) in order to maintain/regain strength. Encouraged to sit up in chair for all meals. Educated on proper use of RW for safety and to reduce risk of falling. Educated on "call don't fall" policy and increased risk of falling due to weakness, instructed to utilize call bell for assistance with all transfers. Pt agreeable to all requests.    AM-PAC 6 CLICK MOBILITY  Total Score:7    Patient left up in chair with all lines intact and call button in reach.    GOALS:   Multidisciplinary Problems       Physical Therapy Goals          Problem: Physical Therapy    Goal Priority Disciplines Outcome Goal Variances Interventions   Physical Therapy Goal     PT, PT/OT      Description: Goals to be met by 4/24/24.  1. Pt will complete bed mobility MOD I.  2. Pt will complete sit to stand CGA.  3. Pt will ambulate 100ft CGA using RW.  4. Pt will increase AMPAC score by 2 points to progress functional mobility.                       History:     Past Medical History:   Diagnosis Date    CHF (congestive heart failure)     DVT (deep venous thrombosis)     Hypertension     Renal disorder        Past Surgical History:   Procedure Laterality Date    ABDOMINAL AORTOGRAPHY  4/4/2024    Procedure: ANGIOGRAM, ABDOMINAL AORTA;  Surgeon: Suzanna Hernandez MD;  Location: [facility] CATH LAB;  Service: Cardiology;;    ANGIOGRAM, CORONARY, WITH LEFT HEART CATHETERIZATION N/A 4/4/2024    Procedure: Angiogram, Coronary, with Left Heart Cath;  Surgeon: Suzanna Hernandez MD;  Location: [facility] CATH LAB;  Service: Cardiology;  Laterality: N/A;    ANGIOGRAM, CORONARY, WITH LEFT HEART CATHETERIZATION N/A 4/5/2024    Procedure: Angiogram, Coronary, with Left Heart Cath;  Surgeon: Suzanna Hernandez MD;  Location: [facility] CATH LAB;  Service: Cardiology;  Laterality: N/A;    ARTERIOGRAPHY OF SUBCLAVIAN ARTERY  4/4/2024    Procedure: ARTERIOGRAM, SUBCLAVIAN;  Surgeon: " Suzanna Hernandez MD;  Location: [facility] CATH LAB;  Service: Cardiology;;    CORONARY ANGIOGRAPHY N/A 4/5/2024    Procedure: ANGIOGRAM, CORONARY ARTERY;  Surgeon: Suzanna Hernandez MD;  Location: [facility] CATH LAB;  Service: Cardiology;  Laterality: N/A;    LITHOTRIPSY, CORONARY TRANSLUMINAL, PERCUTANEOUS  4/5/2024    Procedure: LITHOTRIPSY, CORONARY TRANSLUMINAL, PERCUTANEOUS;  Surgeon: Suzanna Hernandez MD;  Location: [facility] CATH LAB;  Service: Cardiology;;    PERCUTANEOUS CORONARY INTERVENTION, ARTERY N/A 4/5/2024    Procedure: Percutaneous coronary intervention;  Surgeon: Suzanna Hernadnez MD;  Location: [facility] CATH LAB;  Service: Cardiology;  Laterality: N/A;    PLACEMENT, IABP N/A 4/5/2024    Procedure: Placement, IABP;  Surgeon: Suzanna Hernandez MD;  Location: [facility] CATH LAB;  Service: Cardiology;  Laterality: N/A;    RIGHT HEART CATHETERIZATION Right 4/4/2024    Procedure: INSERTION, CATHETER, RIGHT HEART;  Surgeon: Suzanna Hernandez MD;  Location: [facility] CATH LAB;  Service: Cardiology;  Laterality: Right;    STENT, DRUG ELUTING, MULTI VESSEL, CORONARY  4/5/2024    Procedure: Stent, Drug Eluting, Multi Vessel, Coronary;  Surgeon: Suzanna Hernandez MD;  Location: [facility] CATH LAB;  Service: Cardiology;;    TRANSESOPHAGEAL ECHOCARDIOGRAM WITH POSSIBLE CARDIOVERSION (RODERICK W/ POSS CARDIOVERSION) N/A 4/4/2024    Procedure: Transesophageal echo (RODERICK) intra-procedure log documentation;  Surgeon: Suzanna Hernandez MD;  Location: [facility] CATH LAB;  Service: Cardiology;  Laterality: N/A;  After [facility]       Time Tracking:     PT Received On: 04/10/24  PT Start Time: 1321  PT Stop Time: 1344  PT Total Time (min): 23 min     Billable Minutes: Evaluation 15min and Therapeutic Activity 8min    4/10/2024

## 2024-04-11 LAB
ALBUMIN SERPL BCP-MCNC: 2.4 G/DL (ref 3.5–5.2)
ALP SERPL-CCNC: 63 U/L (ref 55–135)
ALT SERPL W/O P-5'-P-CCNC: 26 U/L (ref 10–44)
ANION GAP SERPL CALC-SCNC: 4 MMOL/L (ref 8–16)
AST SERPL-CCNC: 42 U/L (ref 10–40)
BASOPHILS # BLD AUTO: 0.01 K/UL (ref 0–0.2)
BASOPHILS NFR BLD: 0.2 % (ref 0–1.9)
BILIRUB DIRECT SERPL-MCNC: 0.5 MG/DL (ref 0.1–0.3)
BILIRUB SERPL-MCNC: 1.2 MG/DL (ref 0.1–1)
BUN SERPL-MCNC: 26 MG/DL (ref 8–23)
CALCIUM SERPL-MCNC: 8.2 MG/DL (ref 8.7–10.5)
CHLORIDE SERPL-SCNC: 96 MMOL/L (ref 95–110)
CO2 SERPL-SCNC: 30 MMOL/L (ref 23–29)
CREAT SERPL-MCNC: 0.9 MG/DL (ref 0.5–1.4)
DIFFERENTIAL METHOD BLD: ABNORMAL
EOSINOPHIL # BLD AUTO: 0.1 K/UL (ref 0–0.5)
EOSINOPHIL NFR BLD: 1.3 % (ref 0–8)
ERYTHROCYTE [DISTWIDTH] IN BLOOD BY AUTOMATED COUNT: 13.1 % (ref 11.5–14.5)
EST. GFR  (NO RACE VARIABLE): >60 ML/MIN/1.73 M^2
GLUCOSE SERPL-MCNC: 95 MG/DL (ref 70–110)
HCT VFR BLD AUTO: 29.7 % (ref 40–54)
HGB BLD-MCNC: 9.9 G/DL (ref 14–18)
IMM GRANULOCYTES # BLD AUTO: 0.02 K/UL (ref 0–0.04)
IMM GRANULOCYTES NFR BLD AUTO: 0.4 % (ref 0–0.5)
LYMPHOCYTES # BLD AUTO: 0.8 K/UL (ref 1–4.8)
LYMPHOCYTES NFR BLD: 14.5 % (ref 18–48)
MAGNESIUM SERPL-MCNC: 2 MG/DL (ref 1.6–2.6)
MCH RBC QN AUTO: 31.8 PG (ref 27–31)
MCHC RBC AUTO-ENTMCNC: 33.3 G/DL (ref 32–36)
MCV RBC AUTO: 96 FL (ref 82–98)
MONOCYTES # BLD AUTO: 0.6 K/UL (ref 0.3–1)
MONOCYTES NFR BLD: 11 % (ref 4–15)
NEUTROPHILS # BLD AUTO: 3.8 K/UL (ref 1.8–7.7)
NEUTROPHILS NFR BLD: 72.6 % (ref 38–73)
NRBC BLD-RTO: 0 /100 WBC
PHOSPHATE SERPL-MCNC: 2.6 MG/DL (ref 2.7–4.5)
PLATELET # BLD AUTO: 160 K/UL (ref 150–450)
PMV BLD AUTO: 9.4 FL (ref 9.2–12.9)
POTASSIUM SERPL-SCNC: 3.8 MMOL/L (ref 3.5–5.1)
PROT SERPL-MCNC: 5.8 G/DL (ref 6–8.4)
RBC # BLD AUTO: 3.11 M/UL (ref 4.6–6.2)
SODIUM SERPL-SCNC: 130 MMOL/L (ref 136–145)
WBC # BLD AUTO: 5.25 K/UL (ref 3.9–12.7)

## 2024-04-11 PROCEDURE — 97530 THERAPEUTIC ACTIVITIES: CPT

## 2024-04-11 PROCEDURE — 25000003 PHARM REV CODE 250: Performed by: INTERNAL MEDICINE

## 2024-04-11 PROCEDURE — 63600175 PHARM REV CODE 636 W HCPCS: Performed by: INTERNAL MEDICINE

## 2024-04-11 PROCEDURE — 84100 ASSAY OF PHOSPHORUS: CPT | Performed by: INTERNAL MEDICINE

## 2024-04-11 PROCEDURE — 83735 ASSAY OF MAGNESIUM: CPT | Performed by: INTERNAL MEDICINE

## 2024-04-11 PROCEDURE — 99900035 HC TECH TIME PER 15 MIN (STAT)

## 2024-04-11 PROCEDURE — 97166 OT EVAL MOD COMPLEX 45 MIN: CPT

## 2024-04-11 PROCEDURE — 85025 COMPLETE CBC W/AUTO DIFF WBC: CPT | Performed by: INTERNAL MEDICINE

## 2024-04-11 PROCEDURE — 11000001 HC ACUTE MED/SURG PRIVATE ROOM

## 2024-04-11 PROCEDURE — 94761 N-INVAS EAR/PLS OXIMETRY MLT: CPT

## 2024-04-11 PROCEDURE — 80048 BASIC METABOLIC PNL TOTAL CA: CPT | Performed by: INTERNAL MEDICINE

## 2024-04-11 PROCEDURE — 99233 SBSQ HOSP IP/OBS HIGH 50: CPT | Mod: ,,, | Performed by: INTERNAL MEDICINE

## 2024-04-11 PROCEDURE — 51702 INSERT TEMP BLADDER CATH: CPT

## 2024-04-11 PROCEDURE — 80076 HEPATIC FUNCTION PANEL: CPT | Performed by: INTERNAL MEDICINE

## 2024-04-11 PROCEDURE — 25000003 PHARM REV CODE 250: Performed by: PHYSICIAN ASSISTANT

## 2024-04-11 RX ORDER — SYRING-NEEDL,DISP,INSUL,0.3 ML 29 G X1/2"
296 SYRINGE, EMPTY DISPOSABLE MISCELLANEOUS ONCE
Status: COMPLETED | OUTPATIENT
Start: 2024-04-11 | End: 2024-04-11

## 2024-04-11 RX ORDER — LIDOCAINE 50 MG/G
1 PATCH TOPICAL
Status: DISCONTINUED | OUTPATIENT
Start: 2024-04-11 | End: 2024-04-12 | Stop reason: HOSPADM

## 2024-04-11 RX ORDER — TAMSULOSIN HYDROCHLORIDE 0.4 MG/1
0.4 CAPSULE ORAL DAILY
Status: DISCONTINUED | OUTPATIENT
Start: 2024-04-11 | End: 2024-04-12 | Stop reason: HOSPADM

## 2024-04-11 RX ORDER — PREDNISONE 20 MG/1
20 TABLET ORAL DAILY
Status: DISCONTINUED | OUTPATIENT
Start: 2024-04-11 | End: 2024-04-12 | Stop reason: HOSPADM

## 2024-04-11 RX ORDER — FUROSEMIDE 20 MG/1
20 TABLET ORAL DAILY
Status: DISCONTINUED | OUTPATIENT
Start: 2024-04-11 | End: 2024-04-12 | Stop reason: HOSPADM

## 2024-04-11 RX ADMIN — APIXABAN 5 MG: 2.5 TABLET, FILM COATED ORAL at 09:04

## 2024-04-11 RX ADMIN — FAMOTIDINE 20 MG: 20 TABLET ORAL at 08:04

## 2024-04-11 RX ADMIN — AMIODARONE HYDROCHLORIDE 200 MG: 200 TABLET ORAL at 08:04

## 2024-04-11 RX ADMIN — ISOSORBIDE MONONITRATE 30 MG: 30 TABLET, EXTENDED RELEASE ORAL at 09:04

## 2024-04-11 RX ADMIN — ACETAMINOPHEN 650 MG: 325 TABLET ORAL at 01:04

## 2024-04-11 RX ADMIN — TAMSULOSIN HYDROCHLORIDE 0.4 MG: 0.4 CAPSULE ORAL at 03:04

## 2024-04-11 RX ADMIN — MORPHINE SULFATE 2 MG: 4 INJECTION INTRAVENOUS at 04:04

## 2024-04-11 RX ADMIN — DOCUSATE SODIUM 50MG AND SENNOSIDES 8.6MG 2 TABLET: 8.6; 5 TABLET, FILM COATED ORAL at 09:04

## 2024-04-11 RX ADMIN — APIXABAN 5 MG: 2.5 TABLET, FILM COATED ORAL at 08:04

## 2024-04-11 RX ADMIN — METOPROLOL TARTRATE 25 MG: 25 TABLET, FILM COATED ORAL at 08:04

## 2024-04-11 RX ADMIN — ACETAMINOPHEN 650 MG: 325 TABLET ORAL at 10:04

## 2024-04-11 RX ADMIN — DOCUSATE SODIUM 50MG AND SENNOSIDES 8.6MG 2 TABLET: 8.6; 5 TABLET, FILM COATED ORAL at 08:04

## 2024-04-11 RX ADMIN — ASPIRIN 81 MG: 81 TABLET, COATED ORAL at 09:04

## 2024-04-11 RX ADMIN — AMIODARONE HYDROCHLORIDE 200 MG: 200 TABLET ORAL at 09:04

## 2024-04-11 RX ADMIN — HYDROCODONE BITARTRATE AND ACETAMINOPHEN 1 TABLET: 5; 325 TABLET ORAL at 09:04

## 2024-04-11 RX ADMIN — MAGNESIUM CITRATE 296 ML: 1.75 LIQUID ORAL at 03:04

## 2024-04-11 RX ADMIN — FUROSEMIDE 20 MG: 20 TABLET ORAL at 01:04

## 2024-04-11 RX ADMIN — HYDROCODONE BITARTRATE AND ACETAMINOPHEN 1 TABLET: 5; 325 TABLET ORAL at 05:04

## 2024-04-11 RX ADMIN — CLOPIDOGREL BISULFATE 75 MG: 75 TABLET ORAL at 09:04

## 2024-04-11 RX ADMIN — POLYETHYLENE GLYCOL 3350 17 G: 17 POWDER, FOR SOLUTION ORAL at 09:04

## 2024-04-11 RX ADMIN — LIDOCAINE 1 PATCH: 50 PATCH CUTANEOUS at 10:04

## 2024-04-11 RX ADMIN — ATORVASTATIN CALCIUM 40 MG: 40 TABLET, FILM COATED ORAL at 09:04

## 2024-04-11 RX ADMIN — METOPROLOL TARTRATE 25 MG: 25 TABLET, FILM COATED ORAL at 09:04

## 2024-04-11 RX ADMIN — PREDNISONE 20 MG: 20 TABLET ORAL at 03:04

## 2024-04-11 RX ADMIN — FAMOTIDINE 20 MG: 20 TABLET ORAL at 09:04

## 2024-04-11 RX ADMIN — HYDROCODONE BITARTRATE AND ACETAMINOPHEN 1 TABLET: 5; 325 TABLET ORAL at 12:04

## 2024-04-11 NOTE — ASSESSMENT & PLAN NOTE
Likely secondary cardiac catheterization and immobility  Start Tylenol scheduled  Hip x-ray negative  We will try oral steroids.

## 2024-04-11 NOTE — ASSESSMENT & PLAN NOTE
-Currently in aflutter HR low 100's  -Contributing to volume status/cardiomyopathy  -Continue BB  -Start amiodarone drip  -Continue heparin drip  -Will need RODERICK/DCCV post ischemic workup as well as EP evaluation to discuss ablation in future    4.4.2024  RODERICK cardioversion today after heart catheterization.    4/5/24  -Remains in SR s/p RODERICK/DCCV  -Amiodarone switched to po  -Continue BB  -Heparin gtt for now, will need Eliquis upon d/c    4.6.2024  BACK IN A FLUTTER THIS MORNING.  We will resume IV amiodarone.  Continue with p.o..    Add digoxin IV.    Continue with beta-blockers.    Continue with balloon pump for now.    He may need a repeat cardioversion if does not convert back to normal with medications.    Future plan for ablation as well.      4.7.2024  Dccv today   Family and patient agreeable     4/8/24  -Remains in SR s/p DCCV yesterday  -Continue amiodarone, BB, digoxin for now  -Continue heparin gtt, will need OAC prior to d/c    4/9/24  -Continue heparin gtt  -Continue amiodarone, BB      4/11/24  -Remains in SR  -Continue amiodarone, BB  -EP f/u as OP

## 2024-04-11 NOTE — PROGRESS NOTES
[facility] - [facility]ensive Care ([facility])  Cardiology  Progress Note    Patient Name: Chucho Clark  MRN: [id number]  Admission Date: 4/2/2024  Hospital Length of Stay: 8 days  Code Status: Full Code   Attending Physician: Rosalina Whatley MD   Primary Care Physician: Ale, Primary Doctor  Expected Discharge Date:   Principal Problem:Acute on chronic congestive heart failure    Subjective:   HPI:  78 y.o. male patient, PMHx: Diastolic HF, HTN, DVT, CAD. Presented to the Emergency Department for evaluation of CP which onset night PTA. Pt visited Dr. Corrales (Cardiology) for the first time on 4/1/24 and was initiated on Eliquis. Pt notes that he also felt some upper abd pain PTA that felt like acid reflux. Pt and son originally thought sxs were cold/flu sxs, but tested negative in the clinic. Symptoms are constant and moderate in severity. No mitigating or exacerbating factors reported. Associated sxs include SOB, palpitations, and increased HR. Patient denies any cough, numbness, HA, fever, n/v/d, and all other sxs at this time. Pt has not had a stress test. In the ED, vitals: 130/69, 121, 22, 97.8F, 95% RA. Significant Labs: BNP: 560, Trop: 0.064, Bili: 1.1. CXR: interstitial edema. EKG: Neg for STEMI. Cardiology consulted in ED. Treated with ASA, Nitro, BB, diuretic. Initiated on Heparin Infusion. Patient is a full code. Placed in observation under the care of Hospital Medicine for management of elevated troponin, ACS rule out.         Cardiology consulted       Hospital Course:   4/3/24-Patient seen and examined today, resting in bed. Feeling better. Less SOB, diuresed well overnight. Still has significant BLE edema. Labs reviewed/stable. EKG with aflutter. TTE with EF of 20-25%, decreased RV function, severe MR. Troponin flat. Discussed ischemic workup possibly tmw pending clinical condition.     4.4.2024  Still in aflutter, swelling significantly improved   In bed, laying comfortably    4/5/24-Patient seen and examined  today, s/p R/LHC yesterday which showed severe triple vessel disease. Underwent RODERICK/DCCV with restoration of SR. Feels ok. Does have some increase in SOB/orthopnea. No CP symptoms. Remains in SR. Reviewed case with patient/CTS, will proceed with repeat LHC today and PCI. LifeVest ordered for SCD prevention.    4.7.2024  Still in aflutter  Discussed with son and patient , sister   Agreeable with dccv    4/8/24-Patient seen and examined today, resting in bed. Family at bedside. Feels ok. SOB stable. No CP. Remains in SR. IABP in place. Labs reviewed. Na 132, creatinine 1.1. Repeat LHC with RCA intervention planned today by Dr. Mayers.    4/9/24-Patient seen and examined today, s/p LHC yesterday with successful PCI of RCA. IABP removed this AM. Patient feels ok, does admit to fatigue. No CP or SOB. Labs reviewed/stable. BP soft, BNP pending. Will likely switch to po Lasix tmw.    4/10/24: Pt seen and examined in ICU this am. No acute issues noted.  No angina/CHF issues.  Labs/chart reviewed. Pt states  he feels ok today.  BP soft.    4/11/24-Patient seen and examined today, sitting up in bedside chair. Ambulated earlier. Complains of back/buttock/left shin pain. No SOB or CP. BP ok, po Lasix initiated. Remains in SR. Labs reviewed.         Review of Systems   Constitutional: Positive for malaise/fatigue.   HENT: Negative.     Eyes: Negative.    Cardiovascular:  Positive for dyspnea on exertion.   Respiratory: Negative.     Endocrine: Negative.    Hematologic/Lymphatic: Negative.    Skin: Negative.    Musculoskeletal:  Positive for arthritis, back pain and joint pain.   Gastrointestinal: Negative.    Genitourinary: Negative.    Neurological: Negative.    Psychiatric/Behavioral: Negative.     Allergic/Immunologic: Negative.      Objective:     Vital Signs (Most Recent):  Temp: 98.2 °F (36.8 °C) (04/11/24 1115)  Pulse: 72 (04/11/24 1115)  Resp: (!) 29 (04/11/24 1115)  BP: 118/60 (04/11/24 0800)  SpO2: (!) 94 % (04/11/24  1115) Vital Signs (24h Range):  Temp:  [97.3 °F (36.3 °C)-98.2 °F (36.8 °C)] 98.2 °F (36.8 °C)  Pulse:  [60-75] 72  Resp:  [18-30] 29  SpO2:  [92 %-100 %] 94 %  BP: ()/(54-60) 118/60     Weight: 111.3 kg (245 lb 6 oz)  Body mass index is 34.22 kg/m².     SpO2: (!) 94 %         Intake/Output Summary (Last 24 hours) at 4/11/2024 1331  Last data filed at 4/11/2024 1019  Gross per 24 hour   Intake 240 ml   Output 1415 ml   Net -1175 ml       Lines/Drains/Airways       Peripherally Inserted Central Catheter Line  Duration             PICC Double Lumen 04/06/24 1325 right basilic 5 days              Drain  Duration                  Urethral Catheter 04/11/24 0030 <1 day                       Physical Exam  Vitals and nursing note reviewed.   Constitutional:       General: He is not in acute distress.     Appearance: Normal appearance. He is well-developed. He is not diaphoretic.   HENT:      Head: Normocephalic and atraumatic.   Eyes:      General:         Right eye: No discharge.         Left eye: No discharge.      Pupils: Pupils are equal, round, and reactive to light.   Cardiovascular:      Rate and Rhythm: Normal rate and regular rhythm.      Heart sounds: Normal heart sounds, S1 normal and S2 normal. No murmur heard.  Pulmonary:      Effort: Pulmonary effort is normal. No respiratory distress.      Breath sounds: Normal breath sounds.   Abdominal:      General: There is no distension.   Musculoskeletal:      Right lower leg: No edema.      Left lower leg: No edema.   Skin:     General: Skin is warm and dry.      Findings: No erythema.   Neurological:      General: No focal deficit present.      Mental Status: He is alert and oriented to person, place, and time.   Psychiatric:         Mood and Affect: Mood normal.         Behavior: Behavior normal.            Significant Labs: CMP   Recent Labs   Lab 04/10/24  0434 04/11/24  0418   * 130*   K 4.2 3.8   CL 96 96   CO2 31* 30*   GLU 92 95   BUN 27* 26*  "  CREATININE 1.0 0.9   CALCIUM 8.4* 8.2*   PROT  --  5.8*   ALBUMIN  --  2.4*   BILITOT  --  1.2*   ALKPHOS  --  63   AST  --  42*   ALT  --  26   ANIONGAP 6* 4*   , CBC   Recent Labs   Lab 04/10/24  0434 04/11/24  0418   WBC 4.83 5.25   HGB 10.6* 9.9*   HCT 32.4* 29.7*    160   , Troponin No results for input(s): "TROPONINI" in the last 48 hours., and All pertinent lab results from the last 24 hours have been reviewed.    Significant Imaging: Echocardiogram: Transthoracic echo (TTE) complete (Cupid Only):   Results for orders placed or performed during the hospital encounter of 04/02/24   Echo   Result Value Ref Range    BSA 2.55 m2    LVOT stroke volume 61.14 cm3    LVIDd 5.50 3.5 - 6.0 cm    LV Systolic Volume 93.48 mL    LV Systolic Volume Index 38.0 mL/m2    LVIDs 4.52 (A) 2.1 - 4.0 cm    LV Diastolic Volume 147.39 mL    LV Diastolic Volume Index 59.91 mL/m2    IVS 1.70 (A) 0.6 - 1.1 cm    LVOT diameter 2.03 cm    LVOT area 3.2 cm2    FS 18 (A) 28 - 44 %    Left Ventricle Relative Wall Thickness 0.49 cm    Posterior Wall 1.35 (A) 0.6 - 1.1 cm    LV mass 382.20 g    LV Mass Index 155 g/m2    MV Peak E Guru 1.29 m/s    MV Peak A Guru 0.49 m/s    TR Max Guru 3.07 m/s    E/A ratio 2.63     E wave deceleration time 154.04 msec    LVOT peak guru 1.01 m/s    Left Ventricular Outflow Tract Mean Velocity 0.94 cm/s    Left Ventricular Outflow Tract Mean Gradient 3.53 mmHg    TAPSE 2.25 cm    LA size 5.05 cm    Left Atrium Minor Axis 7.53 cm    Left Atrium Major Axis 7.28 cm    RA Major Axis 5.96 cm    AV regurgitation pressure 1/2 time 709.33474753140890 ms    AR Max Guru 2.83 m/s    AV mean gradient 8 mmHg    AV peak gradient 13 mmHg    Ao peak guru 1.81 m/s    Ao VTI 33.00 cm    LVOT peak VTI 18.90 cm    AV valve area 1.85 cm²    AV Velocity Ratio 0.56     AV index (prosthetic) 0.57     ZENIA by Velocity Ratio 1.81 cm²    Mr max guru 4.90 m/s    MV mean gradient 4 mmHg    MV peak gradient 12 mmHg    MV stenosis pressure " 1/2 time 44.67 ms    MV valve area p 1/2 method 4.93 cm2    MV valve area by continuity eq 1.66 cm2    MV VTI 36.9 cm    TV mean gradient 33 mmHg    Triscuspid Valve Regurgitation Peak Gradient 38 mmHg    Ao root annulus 3.51 cm    STJ 3.32 cm    Ascending aorta 3.26 cm    IVC diameter 2.72 cm    ZLVIDS -4.00     ZLVIDD -8.28     LA Volume Index 69.8 mL/m2    LA volume 171.60 cm3    LA WIDTH 5.4 cm    RA Width 3.8 cm    TV resting pulmonary artery pressure 41 mmHg    RV TB RVSP 6 mmHg    Est. RA pres 3 mmHg    Narrative      Left Ventricle: The left ventricle is severely dilated. Mildly   increased wall thickness. There is moderately reduced systolic function   with a visually estimated ejection fraction of 30 - 35%. There is   diastolic dysfunction.    Right Ventricle: Normal right ventricular cavity size. Wall thickness   is normal. Right ventricle wall motion  is normal. Systolic function is   normal.    Aortic Valve: There is mild aortic regurgitation.    Mitral Valve: There is severe regurgitation.    Pulmonary Artery: The estimated pulmonary artery systolic pressure is   41 mmHg.    IVC/SVC: Normal venous pressure at 3 mmHg.      and EKG: Reviewed  Assessment and Plan:   Patient who presents with acute CHF/PAFL/multivessel CAD. Stable, recovering well. Continue OMT. Resume low dose Lasix and assess response.     * Acute on chronic congestive heart failure  Patient is identified as having Diastolic (HFpEF) heart failure that is Acute. CHF is currently uncontrolled due to Pulmonary edema/pleural effusion on CXR. Latest ECHO performed and demonstrates- No results found for this or any previous visit.  . Continue Furosemide and monitor clinical status closely. Monitor on telemetry. Patient is on CHF pathway.  Monitor strict Is&Os and daily weights.  Place on fluid restriction of 2 L. Cardiology has been consulted. Continue to stress to patient importance of self efficacy and  on diet for CHF. Last BNP  reviewed- and noted below   Recent Labs   Lab 04/09/24  1335   *     Cw iv lasix     4/3/24  -TTE with EF of 20-25%, moderately reduced RV function, pulmonary HTN, severe MR  -Continue IV diuresis  -Continue BB  -Will add ARB vs Entresto pending creatinine/BP trend  -Strict I's/O's  -Probable R/LHC tmw pending reassessment    4.4.2024  Right and left heart catheterization today.    Lasix held due to drop in pressure yesterday evening.    Discussed risks and benefits with the family, son and daughter at bedside.    4/5/24  -More SOB this AM with cough/orthopnea  -IV Lasix x one dose  -Continue Aldactone, BB    4.6.2024  Continue with balloon pump for today.    We will get a central line.  Discussed with ICU staff.  Check CVP and mixed venous.    Lactic acid normal.    Continue with IV diuresis.     4/8/24  -IABP remains in place  -Continue IV diuresis for now  -Continue BB  -Will optimize regimen further post procedure     4/9/24  -Hold further IV diuresis  -Continue BB  -BNP pending, will attempt to switch to po Lasix and add Entresto tmw    4/11/24  -Stable, po Lasix resumed  -Continue BB  -BP soft, will add ARB vs ARNI if tolerated      Left bundle branch block  We will have him evaluated by EP for possible CRT.        BMI 39.0-39.9,adult  -Weight loss    Atrial flutter  -Currently in aflutter HR low 100's  -Contributing to volume status/cardiomyopathy  -Continue BB  -Start amiodarone drip  -Continue heparin drip  -Will need RODERICK/DCCV post ischemic workup as well as EP evaluation to discuss ablation in future    4.4.2024  RODERICK cardioversion today after heart catheterization.    4/5/24  -Remains in SR s/p RODERICK/DCCV  -Amiodarone switched to po  -Continue BB  -Heparin gtt for now, will need Eliquis upon d/c    4.6.2024  BACK IN A FLUTTER THIS MORNING.  We will resume IV amiodarone.  Continue with p.o..    Add digoxin IV.    Continue with beta-blockers.    Continue with balloon pump for now.    He may need a  repeat cardioversion if does not convert back to normal with medications.    Future plan for ablation as well.      4.7.2024  Dccv today   Family and patient agreeable     4/8/24  -Remains in SR s/p DCCV yesterday  -Continue amiodarone, BB, digoxin for now  -Continue heparin gtt, will need OAC prior to d/c    4/9/24  -Continue heparin gtt  -Continue amiodarone, BB      4/11/24  -Remains in SR  -Continue amiodarone, BB  -EP f/u as OP    Orthopnea  -IV Lasix x one dose    Chest pain  Heparin iv   Trend trop   Echo   Aspirin   Will need ischemic work up prior to discharge  Cw toprol    4/3/24  -Stable, troponin flat  -Continue ASA, BB, statin, heparin gtt  -Probable LHC tmw pending reassessment of volume status    4/5/24  -Multivessel CAD on cath    Elevated troponin  -Flat  -Likely secondary to demand ischemia from new onset atrial flutter and combined CHF  -Continue ASA, BB, heparin gtt, statin  -Ischemic workup with [facility] once better compensated    4/5/24  -s/p LHC which showed triple vessel disease  -Case discussed with CTS and patient/family, repeat LHC today with PCI. Dr. Hernandez explained all risks, benefits, and treatment alternatives. All questions answered. Patient agreeable with proceeding.     4/8/24  -See plan under CAD    Essential hypertension  -Continue BB  -Monitor BP trend    Coronary artery disease involving native coronary artery of native heart  04/06/2024.    Status post PCI to LAD and circumflex yesterday.    He will eventually need at this point PCI of his RCA  to help improve his mitral valve function    4/8/24  -Stable, CP free  -IABP remains in place  -Repeat [facility] with RCA intervention today by Dr. Mayers  -Continue ASA, statin, Plavix, heparin gtt, Imdur, BB    4/9/24  -s/p LHC with successful intervention of RCA  -IABP removed  -Patient denies CP/angina  -Continue ASA, statin, Plavix, heparin gtt, Imdur, BB    4/11/24  -Stable, see above        VTE Risk Mitigation (From admission, onward)            Ordered     apixaban tablet 5 mg  2 times daily         04/09/24 1323     Reason for No Pharmacological VTE Prophylaxis  Once        Comments: Heparin Infusion   Question:  Reasons:  Answer:  Physician Provided (leave comment)    04/02/24 1153     IP VTE HIGH RISK PATIENT  Once         04/02/24 1153     Place sequential compression device  Until discontinued         04/02/24 1153                    Roxi De Leon PA-C  Cardiology  [facility] - [facility] ([facility])

## 2024-04-11 NOTE — PLAN OF CARE
VVS, NSR on monitor, normotensive, afebrile.  Bladder scanned pt lastnight >400ml in bladder, per provider did straight cath.  Rescanned later >300, per provider inserted murray.  PRN meds given (see mar)    Call light in reach, bed low and locked.  All alarms audible and appropriate

## 2024-04-11 NOTE — PT/OT/SLP EVAL
"Occupational Therapy Evaluation and Treatment    Name: Chucho Clark  MRN: [id number]  Admitting Diagnosis: Acute on chronic congestive heart failure  Recent Surgery: Procedure(s) (LRB):  Percutaneous coronary intervention- RCA/  (N/A)  Repair, Chronic Total Occlusion, Coronary  Stent, Drug Eluting, Multi Vessel, Coronary  Thrombectomy, Coronary 3 Days Post-Op    Recommendations:     Discharge Recommendations: Moderate Intensity Therapy  Level of Assistance Recommended: 24 hours significant assistance  Discharge Equipment Recommendations: to be determined by next level of care  Barriers to discharge: Decreased caregiver support (son reports that he works from home, but unclear if he can provide 24/7 SPV and physical A at d/c)    Assessment:     Chucho Clark is a 78 y.o. male with a medical diagnosis of Acute on chronic congestive heart failure. He presents with performance deficits affecting function including weakness, impaired endurance, impaired self care skills, impaired functional mobility, impaired balance, impaired cardiopulmonary response to activity, pain, decreased safety awareness, edema, decreased lower extremity function, decreased upper extremity function.    Rehab Prognosis: Fair; patient would benefit from acute OT services to address these deficits and reach maximum level of function.    Plan:     Patient to be seen 2 x/week to address the above listed problems via self-care/home management, therapeutic activities, therapeutic exercises  Plan of Care Expires: 04/25/24  Plan of Care Reviewed with: patient, son    Subjective     Chief Complaint: Reported "My knee is even bothering me now."  Patient Comments/Goals: increase independence  Pain/Comfort:  Pain Rating 1: 6/10 (@ rest in R LE)  Pain Addressed 1:  (activity pacing)  Pain Rating Post-Intervention 1:  (pain increased with movement, but returns to grossly 6/10 with rest breaks)    Social History:  Living Environment: Patient lives alone in a " single story home, can live on 1st floor. No steps/stairs to enter.  Prior Level of Function: Prior to admission, patient was independent with ADLs and community distance ambulation.  Roles and Routines: Patient was driving and working prior to admission.  Equipment Used at Home: none  DME owned (not currently used): none  Assistance Upon Discharge:  son    Objective:     Communicated with nurse, Hakeem, prior to session. Patient found supine with blood pressure cuff, pulse ox (continuous), telemetry, PICC line, murray catheter upon OT entry to room.    General Precautions: Standard, fall   Orthopedic Precautions: N/A   Braces: N/A    Respiratory Status: Room air    Occupational Performance    Gait belt applied - Yes    Bed Mobility:   Supine to sit from left side of bed with moderate assistance and of 2 persons    Functional Mobility/Transfers:  Sit <> Stand Transfer with moderate assistance and 2 persons with rolling walker  Bed <> Chair Transfer using Step Transfer technique with moderate assistance with rolling walker  Functional Mobility: Patient completed ~x4ft functional mobility with RW and mod A to increase dynamic standing balance and activity tolerance needed for ADL completion.  Provided with v/c for technique with transfers to increase safety and independence with completion  Significant focus on appropriate use of RW for offloading R LE with steps to minimize pain  Slight improvements in weight shifting to R as session progressed  Attempted to encourage additional steps, however, patient declined due to pain    Activities of Daily Living:  Grooming: set up assistance  Lower Body Dressing: maximal assistance    Cognitive/Visual Perceptual:  Cognitive/Psychosocial Skills:    -     Oriented to: Person, Place, Time, Situation  -     Follows Commands/attention: Follows one-step commands    Physical Exam:  Balance:    -     Sitting: contact guard assistance  -     Standing: moderate assistance  Upper  Extremity Range of Motion:     -       Right Upper Extremity: WFL  -       Left Upper Extremity: WFL  Upper Extremity Strength:    -       Right Upper Extremity: Deficits: grossly 4/5  -       Left Upper Extremity: Deficits: grossly 4/5   Strength:    -       Right Upper Extremity: Deficits: fair  -       Left Upper Extremity: Deficits: fair    Noted to have visible edema to R knee and thigh. Attempted to elevate R LE while upright in recliner with wedge on floor.    AMPA 6 Click ADL:  AMPAC Total Score: 15    Treatment & Education:  Therapist provided facilitation and instruction of proper body mechanics, energy conservation, and fall prevention strategies during tasks listed above  Patient educated on role of OT, POC, and goals for therapy  Patient educated on importance of OOB activities with staff member assistance and sitting OOB majority of the day  Encouraged completion of B UE AROM therex throughout the day to increase functional strength and activity tolerance needed for ADL completion.    Patient left up in chair with all lines intact, call button in reach, chair alarm on, and RN and son present.    GOALS:   Multidisciplinary Problems       Occupational Therapy Goals          Problem: Occupational Therapy    Goal Priority Disciplines Outcome Interventions   Occupational Therapy Goal     OT, PT/OT     Description: Goals to be met by: 4/25/24     Patient will increase functional independence with ADLs by performing:    Toileting from bedside commode with Contact Guard Assistance for hygiene and clothing management.   Toilet transfer to bedside commode with Contact Guard Assistance.  Increased functional strength in B UE grossly by 1/2 MM grade.                         History:     Past Medical History:   Diagnosis Date    CHF (congestive heart failure)     DVT (deep venous thrombosis)     Hypertension     Renal disorder          Past Surgical History:   Procedure Laterality Date    ABDOMINAL AORTOGRAPHY   4/4/2024    Procedure: ANGIOGRAM, ABDOMINAL AORTA;  Surgeon: Suzanna Hernandez MD;  Location: [facility] CATH LAB;  Service: Cardiology;;    ANGIOGRAM, CORONARY, WITH LEFT HEART CATHETERIZATION N/A 4/4/2024    Procedure: Angiogram, Coronary, with Left Heart Cath;  Surgeon: Suzanna Hernandez MD;  Location: [facility] CATH LAB;  Service: Cardiology;  Laterality: N/A;    ANGIOGRAM, CORONARY, WITH LEFT HEART CATHETERIZATION N/A 4/5/2024    Procedure: Angiogram, Coronary, with Left Heart Cath;  Surgeon: Suzanna Hernandez MD;  Location: [facility] CATH LAB;  Service: Cardiology;  Laterality: N/A;    ARTERIOGRAPHY OF SUBCLAVIAN ARTERY  4/4/2024    Procedure: ARTERIOGRAM, SUBCLAVIAN;  Surgeon: Suzanna Hernandez MD;  Location: [facility] CATH LAB;  Service: Cardiology;;    CORONARY ANGIOGRAPHY N/A 4/5/2024    Procedure: ANGIOGRAM, CORONARY ARTERY;  Surgeon: Suzanna Hernandez MD;  Location: [facility] CATH LAB;  Service: Cardiology;  Laterality: N/A;    LITHOTRIPSY, CORONARY TRANSLUMINAL, PERCUTANEOUS  4/5/2024    Procedure: LITHOTRIPSY, CORONARY TRANSLUMINAL, PERCUTANEOUS;  Surgeon: Suzanna Hernandez MD;  Location: [facility] CATH LAB;  Service: Cardiology;;    PERCUTANEOUS CORONARY INTERVENTION, ARTERY N/A 4/5/2024    Procedure: Percutaneous coronary intervention;  Surgeon: Suzanna Hernandez MD;  Location: [facility] CATH LAB;  Service: Cardiology;  Laterality: N/A;    PLACEMENT, IABP N/A 4/5/2024    Procedure: Placement, IABP;  Surgeon: Suzanna Hernandez MD;  Location: [facility] CATH LAB;  Service: Cardiology;  Laterality: N/A;    RIGHT HEART CATHETERIZATION Right 4/4/2024    Procedure: INSERTION, CATHETER, RIGHT HEART;  Surgeon: Suzanna Hernandez MD;  Location: [facility] CATH LAB;  Service: Cardiology;  Laterality: Right;    STENT, DRUG ELUTING, MULTI VESSEL, CORONARY  4/5/2024    Procedure: Stent, Drug Eluting, Multi Vessel, Coronary;  Surgeon: Suzanna Hernandez MD;  Location: [facility] CATH LAB;  Service: Cardiology;;    TRANSESOPHAGEAL ECHOCARDIOGRAM WITH POSSIBLE  CARDIOVERSION (RODERICK W/ POSS CARDIOVERSION) N/A 4/4/2024    Procedure: Transesophageal echo (RODERICK) intra-procedure log documentation;  Surgeon: Suzanna Hernandez MD;  Location: [facility] CATH LAB;  Service: Cardiology;  Laterality: N/A;  After [facility]       Time Tracking:     OT Date of Treatment: 04/11/24  OT Start Time: 0850  OT Stop Time: 0915  OT Total Time (min): 25 min    Billable Minutes: Evaluation 15 and Therapeutic Activity 10    Lisy Gonzalez OT  4/11/2024

## 2024-04-11 NOTE — PT/OT/SLP PROGRESS
Physical Therapy Treatment    Patient Name:  Chucho Clark   MRN:  [id number]    Recommendations:     Discharge Recommendations: Moderate Intensity Therapy  Discharge Equipment Recommendations: to be determined by next level of care  Barriers to discharge: Decreased caregiver support    Assessment:     Chucho Clark is a 78 y.o. male admitted with a medical diagnosis of Acute on chronic congestive heart failure.  He presents with the following impairments/functional limitations: weakness, impaired endurance, impaired functional mobility, gait instability, impaired balance, pain, decreased safety awareness, edema, impaired skin, decreased lower extremity function, decreased upper extremity function, decreased coordination.    Rehab Prognosis: Good; patient would benefit from acute skilled PT services to address these deficits and reach maximum level of function.    Recent Surgery: Procedure(s) (LRB):  Percutaneous coronary intervention- RCA/  (N/A)  Repair, Chronic Total Occlusion, Coronary  Stent, Drug Eluting, Multi Vessel, Coronary  Thrombectomy, Coronary 3 Days Post-Op    Plan:     During this hospitalization, patient to be seen 3 x/week to address the identified rehab impairments via gait training, therapeutic activities, therapeutic exercises and progress toward the following goals:    Plan of Care Expires:  04/24/24    Subjective     Chief Complaint: RLE PAIN  Patient/Family Comments/goals: AGREEABLE TO OOB MOBILITY  Pain/Comfort:  Pain Rating 1: 8/10 (5/10 AT REST)  Location - Side 1: Right  Location - Orientation 1: generalized  Location 1: leg (PAIN WITH MOVMENT AND WBING)  Pain Addressed 1: Reposition, Cessation of Activity  Pain Rating Post-Intervention 1: 5/10      Objective:     Communicated with NURSE ROBERTS prior to session.  Patient found supine with blood pressure cuff, pulse ox (continuous), telemetry, murray catheter, peripheral IV, PICC line upon PT entry to room.     General Precautions:  "Standard, fall  Orthopedic Precautions: N/A  Braces: N/A  Respiratory Status: Room air     Functional Mobility:  Bed Mobility:     Rolling Left:  minimum assistance  Scooting: minimum assistance  Supine to Sit: moderate assistance and of 2 persons, RLE ASSIST  Transfers:     Sit to Stand:  moderate assistance and of 2 persons with rolling walker  Bed to Chair: moderate assistance with  rolling walker  using  Step Transfer  Gait: PT AMB APPROX. 4' WITH RW AND MODA TO TF FROM BED TO CHAIR, CUES FOR UPRIGHT POSTURE AND RW USE/SAFETY, IMPORTANCE OF USING RW TO OFF LOAD RLE AS NEEDED, CUES FOR UPRIGHT POSTURE, ENCOURAGED GAIT TO DOOR IN ROOM BUT PT DECLINED DUE TO C/O SEVERE PAIN  Balance: FAIR SITTING BALANCE, POOR DYNAMIC BALANCE DURING TF  PT EDUCATED IN BLE THEREX TO PERFORM WHILE SEATED IN CHAIR: HIP FLEX/EXT, LAQ, AP'S    AM-PAC 6 CLICK MOBILITY  Turning over in bed (including adjusting bedclothes, sheets and blankets)?: 2  Sitting down on and standing up from a chair with arms (e.g., wheelchair, bedside commode, etc.): 2  Moving from lying on back to sitting on the side of the bed?: 2  Moving to and from a bed to a chair (including a wheelchair)?: 2  Need to walk in hospital room?: 2     Treatment & Education:  PT EDUCATION:  - ROLE OF P.T. AND POC IN ACUTE CARE HOSPITAL SETTING  - RW USE AND SAFETY DURING TF'S AND GAIT  - ENCOURAGED TO INCREASE TIME OOB IN CHAIR TO TOLERANCE   - TO CONTINUE THERAPUETIC EXERCISES THROUGHOUT THE DAY TO INCREASE ACTIVITY TOLERANCE AND DECREASE RISK FOR PNEUMONIA AND BLOOD CLOTS: HIP FLEX/EXT, HIP ABD/ADD, QUAD SET, HEEL SLIDE, AP  - RISK FOR FALLS DUE TO GENERALIZED WEAKNESS, EDUCATED ON "CALL DON'T FALL", ENCOURAGED TO CALL FOR ASSISTANCE WITH ALL NEEDS SUCH AS BED<>CHAIR TRANSFERS OR TRIPS TO BATHROOM, PT AGREEABLE TO SAFETY PRECAUTIONS    Patient left up in chair with all lines intact, call button in reach, chair alarm on, NURSE notified, SON present, and BLE ELEVATED .    GOALS: "   Multidisciplinary Problems       Physical Therapy Goals          Problem: Physical Therapy    Goal Priority Disciplines Outcome Goal Variances Interventions   Physical Therapy Goal     PT, PT/OT Ongoing, Progressing     Description: Goals to be met by 4/24/24.  1. Pt will complete bed mobility MOD I.  2. Pt will complete sit to stand CGA.  3. Pt will ambulate 100ft CGA using RW.  4. Pt will increase AMPAC score by 2 points to progress functional mobility.                       Time Tracking:     PT Received On: 04/11/24  PT Start Time: 0845     PT Stop Time: 0915  PT Total Time (min): 30 min     Billable Minutes: Therapeutic Activity 30    Treatment Type: Treatment  PT/PTA: PT     Number of PTA visits since last PT visit: 0     04/11/2024

## 2024-04-11 NOTE — PROGRESS NOTES
[facility] - Intensive Care ([facility] Medicine  Progress Note    Patient Name: Chucho Clark  MRN: [id number]  Patient Class: IP- Inpatient   Admission Date: 4/2/2024  Length of Stay: 8 days  Attending Physician: Rosalina Whatley MD  Primary Care Provider: Ale, Primary Doctor        Subjective:     Principal Problem:Acute on chronic congestive heart failure        HPI:  78 y.o. male patient, PMHx: Diastolic HF, HTN, DVT, CAD. Presented to the Emergency Department for evaluation of CP which onset night PTA. Pt visited Dr. Corrales (Cardiology) for the first time on 4/1/24 and was initiated on Eliquis. Pt notes that he also felt some upper abd pain PTA that felt like acid reflux. Pt and son originally thought sxs were cold/flu sxs, but tested negative in the clinic. Symptoms are constant and moderate in severity. No mitigating or exacerbating factors reported. Associated sxs include SOB, palpitations, and increased HR. Patient denies any cough, numbness, HA, fever, n/v/d, and all other sxs at this time. Pt has not had a stress test. In the ED, vitals: 130/69, 121, 22, 97.8F, 95% RA. Significant Labs: BNP: 560, Trop: 0.064, Bili: 1.1. CXR: interstitial edema. EKG: Neg for STEMI. Cardiology consulted in ED. Treated with ASA, Nitro, BB, diuretic. Initiated on Heparin Infusion. Patient is a full code. Placed in observation under the care of Hospital Medicine for management of elevated troponin, ACS rule out.     Overview/Hospital Course:  The patient presented with chest pain and shortness of breath. He reported he had been unable to lay flat for 2 weeks prior to presentation. He was noted to have bilateral LE edema as well. Workup revealed elevated troponin and volume overload. He was placed on IV diuretics. Cardiology was consulted. Echo revealed reduced EF. He diuresed well. Left heart cath revealed severe triple vessel disease. He underwent RODERICK/DCCV and sinus rhythm restored. The patient went for repeat C 4/5.   He had PCI to the mid left circ x1 in the mid LAD x1.  He developed flash pulmonary edema required noninvasive ventilation as well as diuretics.  He was placed on the balloon pump and transferred back to ICU.  Subsequently went back into AFib and had repeat cardioversion on 04/07.  Cardiac catheterization on 04/08 with 3 stents placed and balloon pump removed.  Patient has been taken off his heparin drip is feeling well able to sit in the bedside chair with no shortness a breath or chest pain.  He was deemed stable to transitioned to telemetry floor and Hospital Medicine was consulted to assume care.    Interval History:  Patient seen and examined with family at bedside.  Continues to complain of pain in his right hip as well as shin on the right side.  This has hindering his ability to get out of the bed.    Review of Systems   Constitutional:  Positive for activity change, appetite change and fatigue.   Respiratory:  Negative for cough, choking, chest tightness and shortness of breath.    Cardiovascular:  Negative for chest pain, palpitations and leg swelling.        Chronic venous stasis bilateral lower extremities   Gastrointestinal:  Positive for constipation.   Genitourinary:  Positive for difficulty urinating.   Musculoskeletal:  Positive for arthralgias. Back pain: Right hip.  Skin:  Color change: bilateral lower extremities.   Neurological:  Positive for weakness.   All other systems reviewed and are negative.    Objective:     Vital Signs (Most Recent):  Temp: 98.2 °F (36.8 °C) (04/11/24 1115)  Pulse: 66 (04/11/24 1400)  Resp: 18 (04/11/24 1400)  BP: (!) 91/52 (04/11/24 1400)  SpO2: 95 % (04/11/24 1400) Vital Signs (24h Range):  Temp:  [97.3 °F (36.3 °C)-98.2 °F (36.8 °C)] 98.2 °F (36.8 °C)  Pulse:  [62-75] 66  Resp:  [18-30] 18  SpO2:  [92 %-100 %] 95 %  BP: ()/(52-60) 91/52     Weight: 111.3 kg (245 lb 6 oz)  Body mass index is 34.22 kg/m².    Intake/Output Summary (Last 24 hours) at 4/11/2024  1503  Last data filed at 4/11/2024 1352  Gross per 24 hour   Intake 240 ml   Output 1525 ml   Net -1285 ml         Physical Exam  Vitals reviewed.   Constitutional:       Appearance: Normal appearance. He is obese. He is ill-appearing.   HENT:      Head: Normocephalic and atraumatic.      Mouth/Throat:      Mouth: Mucous membranes are moist.      Pharynx: Oropharynx is clear.   Eyes:      Extraocular Movements: Extraocular movements intact.      Conjunctiva/sclera: Conjunctivae normal.   Cardiovascular:      Rate and Rhythm: Regular rhythm. Bradycardia present.      Pulses: Normal pulses.      Heart sounds: Normal heart sounds.   Pulmonary:      Effort: Pulmonary effort is normal.      Breath sounds: Normal breath sounds.   Abdominal:      General: Bowel sounds are normal.      Palpations: Abdomen is soft.   Musculoskeletal:      Cervical back: Normal range of motion and neck supple.      Comments: Pain in right hip.   Skin:     General: Skin is warm and dry.      Comments: Chronic lower extremity venous stasis changes with color changes.  No pitting edema   Neurological:      General: No focal deficit present.      Mental Status: He is alert and oriented to person, place, and time. Mental status is at baseline.   Psychiatric:         Mood and Affect: Mood normal.         Behavior: Behavior normal.         Thought Content: Thought content normal.             Significant Labs: All pertinent labs within the past 24 hours have been reviewed.  CBC:   Recent Labs   Lab 04/10/24  0434 04/11/24 0418   WBC 4.83 5.25   HGB 10.6* 9.9*   HCT 32.4* 29.7*    160     CMP:   Recent Labs   Lab 04/10/24  0434 04/11/24  0418   * 130*   K 4.2 3.8   CL 96 96   CO2 31* 30*   GLU 92 95   BUN 27* 26*   CREATININE 1.0 0.9   CALCIUM 8.4* 8.2*   PROT  --  5.8*   ALBUMIN  --  2.4*   BILITOT  --  1.2*   ALKPHOS  --  63   AST  --  42*   ALT  --  26   ANIONGAP 6* 4*       Significant Imaging: I have reviewed all pertinent imaging  results/findings within the past 24 hours.    Assessment/Plan:      * Acute on chronic congestive heart failure  Patient is identified as having Combined Systolic and Diastolic heart failure that is Acute on chronic. CHF is currently uncontrolled due to >3 pillow orthopnea and Rales/crackles on pulmonary exam. Latest ECHO performed and demonstrates- Results for orders placed during the hospital encounter of 04/02/24    Echo Saline Bubble? No    Interpretation Summary    Left Ventricle: The left ventricle is moderately dilated. Mildly increased wall thickness. There is severely reduced systolic function with a visually estimated ejection fraction of 20 - 25%. Grade II diastolic dysfunction.    Right Ventricle: Normal right ventricular cavity size. Wall thickness is normal. Right ventricle wall motion  is normal. Systolic function is moderately reduced.    Left Atrium: Left atrium is severely dilated.    Aortic Valve: There is moderate aortic valve sclerosis. There is mild aortic regurgitation.    Mitral Valve: There is severe regurgitation.    Tricuspid Valve: There is mild regurgitation.    Pulmonary Artery: The estimated pulmonary artery systolic pressure is 45 mmHg.    IVC/SVC: Elevated venous pressure at 15 mmHg.  . Continue Beta Blocker and Furosemide and monitor clinical status closely. Monitor on telemetry. Patient is on CHF pathway.  Monitor strict Is&Os and daily weights.  Place on fluid restriction of 1.5 L. Cardiology has been consulted. Continue to stress to patient importance of self efficacy and  on diet for CHF. Last BNP reviewed- and noted below   Recent Labs   Lab 04/09/24  1335   *       -continue diuretic        Hip pain, acute, right  Likely secondary cardiac catheterization and immobility  Start Tylenol scheduled  Hip x-ray negative  We will try oral steroids.    Left bundle branch block        Atrial flutter  S/p RODERICK/DCCV x2  In sinus kylah      Orthopnea  --Continue  diuresis    Chest pain  -continue diuresis  -currently chest pain free  Status post 5 stent placements      Elevated troponin  Status post heart catheterization with stent placement        Meralgia paraesthetica, right        Venous stasis dermatitis of both lower extremities  Chronic venous stasis. Worsened appearance of BLE, likely secondary to inability to elevate legs over the past 2 weeks due to orthopnea.     --Cont diuresis      Essential hypertension  Chronic, . Latest blood pressure and vitals reviewed-     Temp:  [97.3 °F (36.3 °C)-98.2 °F (36.8 °C)]   Pulse:  [62-75]   Resp:  [18-30]   BP: ()/(52-60)   SpO2:  [92 %-100 %] .   Home meds for hypertension were reviewed and noted below.   Hypertension Medications               metoprolol succinate (TOPROL-XL) 25 MG 24 hr tablet Take 1 tablet (25 mg total) by mouth once daily.    torsemide (DEMADEX) 20 MG Tab Take 1 tablet (20 mg total) by mouth 2 (two) times a day.            While in the hospital, will manage blood pressure as follows; Lasix 20 mg p.o. q.day, metoprolol 25 mg p.o. b.i.d., isosorbide mononitrate 30 mg p.o. q.day    Will utilize p.r.n. blood pressure medication only if patient's blood pressure greater than 160/100 and he develops symptoms such as worsening chest pain or shortness of breath.    Coronary artery disease involving native coronary artery of native heart  Patient with known CAD s/p stent placement , which is controlled Will continue ASA, Plavix, and Statin and monitor for S/Sx of angina/ACS. Continue to monitor on telemetry.       VTE Risk Mitigation (From admission, onward)           Ordered     apixaban tablet 5 mg  2 times daily         04/09/24 1323     Reason for No Pharmacological VTE Prophylaxis  Once        Comments: Heparin Infusion   Question:  Reasons:  Answer:  Physician Provided (leave comment)    04/02/24 1153     IP VTE HIGH RISK PATIENT  Once         04/02/24 1153     Place sequential compression device  Until  discontinued         04/02/24 1153                    Discharge Planning   ZOEY:      Code Status: Full Code   Is the patient medically ready for discharge?:     Reason for patient still in hospital (select all that apply): Patient trending condition and PT / OT recommendations  Discharge Plan A: Home Health              Rosalina Santillan MD  Department of Hospital Medicine   '[facility] - Intensive Care ([facility])

## 2024-04-11 NOTE — PLAN OF CARE
Plan of care reviewed with pt and pt's family. Worked with PT/OT this shift, still complains of R hip pain, lidocaine patch applied, prn pain meds. Up in chair most of shift. Life vest delivered and applied this shift. Ann cath remains in use for retention. Plans for possible rehab placement upon discharge. Awaiting staffed bed on med/tele unit for transfer.

## 2024-04-11 NOTE — ASSESSMENT & PLAN NOTE
04/06/2024.    Status post PCI to LAD and circumflex yesterday.    He will eventually need at this point PCI of his RCA  to help improve his mitral valve function    4/8/24  -Stable, CP free  -IABP remains in place  -Repeat [facility] with RCA intervention today by Dr. Mayers  -Continue ASA, statin, Plavix, heparin gtt, Imdur, BB    4/9/24  -s/p [facility] with successful intervention of RCA  -IABP removed  -Patient denies CP/angina  -Continue ASA, statin, Plavix, heparin gtt, Imdur, BB    4/11/24  -Stable, see above

## 2024-04-11 NOTE — PLAN OF CARE
04/11/24 1241   Post-Acute Status   Post-Acute Authorization HME   HME Status Set-up Complete/Auth obtained     Lifevest fit and delivered to patient.

## 2024-04-11 NOTE — CONSULTS
[facility] - Intensive Care ([facility])  Wound Care    Patient Name:  Chucho Clark   MRN:  [id number]  Date: 4/11/2024  Diagnosis: Acute on chronic congestive heart failure    History:     Past Medical History:   Diagnosis Date    CHF (congestive heart failure)     DVT (deep venous thrombosis)     Hypertension     Renal disorder        Social History     Socioeconomic History    Marital status:    Tobacco Use    Smoking status: Former    Smokeless tobacco: Former   Substance and Sexual Activity    Alcohol use: Yes    Drug use: Never     Social Determinants of Health     Financial Resource Strain: Low Risk  (4/7/2024)    Overall Financial Resource Strain (CARDIA)     Difficulty of Paying Living Expenses: Not hard at all   Food Insecurity: No Food Insecurity (4/7/2024)    Hunger Vital Sign     Worried About Running Out of Food in the Last Year: Never true     Ran Out of Food in the Last Year: Never true   Transportation Needs: No Transportation Needs (4/7/2024)    PRAPARE - Transportation     Lack of Transportation (Medical): No     Lack of Transportation (Non-Medical): No   Physical Activity: Unknown (3/10/2024)    Exercise Vital Sign     Days of Exercise per Week: 0 days   Stress: No Stress Concern Present (4/7/2024)    [employer] - Occupational Stress Questionnaire     Feeling of Stress : Not at all   Recent Concern: Stress - Stress Concern Present (3/10/2024)    [employer] - Occupational Stress Questionnaire     Feeling of Stress : To some extent   Social Connections: Unknown (3/10/2024)    Social Connection and Isolation Panel [NHANES]     Frequency of Communication with Friends and Family: More than three times a week     Frequency of Social Gatherings with Friends and Family: Three times a week     Active Member of Clubs or Organizations: Yes     Attends Club or Organization Meetings: More than 4 times per year     Marital Status:    Housing  Stability: Low Risk  (4/7/2024)    Housing Stability Vital Sign     Unable to Pay for Housing in the Last Year: No     Number of Places Lived in the Last Year: 2     Unstable Housing in the Last Year: No       Precautions:     Allergies as of 04/02/2024 - Reviewed 04/02/2024   Allergen Reaction Noted    Vancomycin Hives 07/07/2020       WOC Assessment Details/Treatment     Consulted on this 77 y/o M patient due to wound to left upper shoulder. Blistering and skin tearing noted after removal of defib pad to left upper back related to MARSI (medical adhesive related skin injury). Patient admitted 4/2 with Chest pain, CHF, and has been in ICU on IABP with multiple visits to cath lab.   He is currently awake and alert, sitting up in chair. Back assessed. Left upper back MARSI noted with partial thickness tissue loss, moist red wound bed, defined edges. Cleansed with saline and patted dry. Shantell wound skin painted with cavilon. Foam dressing applied. Recommend change twice weekly and prn excess drainage.  Discussed with primary nurse.       04/11/24 1015   WOCN Assessment   WOCN Total Time (mins) 30   Visit Date 04/11/24   Visit Time 1015   Consult Type New   WOCN Speciality Wound   Wound other        Altered Skin Integrity 04/10/24 0720 Left upper Back Medical adhesive related skin injury   Date First Assessed/Time First Assessed: 04/10/24 0720   Altered Skin Integrity Present on Admission - Did Patient arrive to the hospital with altered skin?: suspected hospital acquired  Side: Left  Orientation: upper  Location: Back  Primary Wound Ty...   Wound Image    Dressing Appearance Intact   Drainage Amount Small   Drainage Characteristics/Odor Serosanguineous   Appearance Red;Moist   Tissue loss description Partial thickness   Red (%), Wound Tissue Color 100 %   Periwound Area Intact   Wound Edges Defined   Wound Length (cm) 4 cm   Wound Width (cm) 11 cm   Wound Depth (cm) 0.1 cm   Wound Volume (cm^3) 4.4 cm^3   Wound Surface  Area (cm^2) 44 cm^2   Care Cleansed with:;Sterile normal saline;Applied:;Skin Barrier   Dressing Foam;Changed   Dressing Change Due 04/15/24       Recommendations made to primary team for wound care per orders, frequent repositioning. Orders placed.     04/11/2024

## 2024-04-11 NOTE — SUBJECTIVE & OBJECTIVE
Review of Systems   Constitutional: Positive for malaise/fatigue.   HENT: Negative.     Eyes: Negative.    Cardiovascular:  Positive for dyspnea on exertion.   Respiratory: Negative.     Endocrine: Negative.    Hematologic/Lymphatic: Negative.    Skin: Negative.    Musculoskeletal:  Positive for arthritis, back pain and joint pain.   Gastrointestinal: Negative.    Genitourinary: Negative.    Neurological: Negative.    Psychiatric/Behavioral: Negative.     Allergic/Immunologic: Negative.      Objective:     Vital Signs (Most Recent):  Temp: 98.2 °F (36.8 °C) (04/11/24 1115)  Pulse: 72 (04/11/24 1115)  Resp: (!) 29 (04/11/24 1115)  BP: 118/60 (04/11/24 0800)  SpO2: (!) 94 % (04/11/24 1115) Vital Signs (24h Range):  Temp:  [97.3 °F (36.3 °C)-98.2 °F (36.8 °C)] 98.2 °F (36.8 °C)  Pulse:  [60-75] 72  Resp:  [18-30] 29  SpO2:  [92 %-100 %] 94 %  BP: ()/(54-60) 118/60     Weight: 111.3 kg (245 lb 6 oz)  Body mass index is 34.22 kg/m².     SpO2: (!) 94 %         Intake/Output Summary (Last 24 hours) at 4/11/2024 1331  Last data filed at 4/11/2024 1019  Gross per 24 hour   Intake 240 ml   Output 1415 ml   Net -1175 ml       Lines/Drains/Airways       Peripherally Inserted Central Catheter Line  Duration             PICC Double Lumen 04/06/24 1325 right basilic 5 days              Drain  Duration                  Urethral Catheter 04/11/24 0030 <1 day                       Physical Exam  Vitals and nursing note reviewed.   Constitutional:       General: He is not in acute distress.     Appearance: Normal appearance. He is well-developed. He is not diaphoretic.   HENT:      Head: Normocephalic and atraumatic.   Eyes:      General:         Right eye: No discharge.         Left eye: No discharge.      Pupils: Pupils are equal, round, and reactive to light.   Cardiovascular:      Rate and Rhythm: Normal rate and regular rhythm.      Heart sounds: Normal heart sounds, S1 normal and S2 normal. No murmur heard.  Pulmonary:  "     Effort: Pulmonary effort is normal. No respiratory distress.      Breath sounds: Normal breath sounds.   Abdominal:      General: There is no distension.   Musculoskeletal:      Right lower leg: No edema.      Left lower leg: No edema.   Skin:     General: Skin is warm and dry.      Findings: No erythema.   Neurological:      General: No focal deficit present.      Mental Status: He is alert and oriented to person, place, and time.   Psychiatric:         Mood and Affect: Mood normal.         Behavior: Behavior normal.            Significant Labs: CMP   Recent Labs   Lab 04/10/24  0434 04/11/24  0418   * 130*   K 4.2 3.8   CL 96 96   CO2 31* 30*   GLU 92 95   BUN 27* 26*   CREATININE 1.0 0.9   CALCIUM 8.4* 8.2*   PROT  --  5.8*   ALBUMIN  --  2.4*   BILITOT  --  1.2*   ALKPHOS  --  63   AST  --  42*   ALT  --  26   ANIONGAP 6* 4*   , CBC   Recent Labs   Lab 04/10/24  0434 04/11/24  0418   WBC 4.83 5.25   HGB 10.6* 9.9*   HCT 32.4* 29.7*    160   , Troponin No results for input(s): "TROPONINI" in the last 48 hours., and All pertinent lab results from the last 24 hours have been reviewed.    Significant Imaging: Echocardiogram: Transthoracic echo (TTE) complete (Cupid Only):   Results for orders placed or performed during the hospital encounter of 04/02/24   Echo   Result Value Ref Range    BSA 2.55 m2    LVOT stroke volume 61.14 cm3    LVIDd 5.50 3.5 - 6.0 cm    LV Systolic Volume 93.48 mL    LV Systolic Volume Index 38.0 mL/m2    LVIDs 4.52 (A) 2.1 - 4.0 cm    LV Diastolic Volume 147.39 mL    LV Diastolic Volume Index 59.91 mL/m2    IVS 1.70 (A) 0.6 - 1.1 cm    LVOT diameter 2.03 cm    LVOT area 3.2 cm2    FS 18 (A) 28 - 44 %    Left Ventricle Relative Wall Thickness 0.49 cm    Posterior Wall 1.35 (A) 0.6 - 1.1 cm    LV mass 382.20 g    LV Mass Index 155 g/m2    MV Peak E Guru 1.29 m/s    MV Peak A Guru 0.49 m/s    TR Max Guru 3.07 m/s    E/A ratio 2.63     E wave deceleration time 154.04 msec    LVOT " peak guru 1.01 m/s    Left Ventricular Outflow Tract Mean Velocity 0.94 cm/s    Left Ventricular Outflow Tract Mean Gradient 3.53 mmHg    TAPSE 2.25 cm    LA size 5.05 cm    Left Atrium Minor Axis 7.53 cm    Left Atrium Major Axis 7.28 cm    RA Major Axis 5.96 cm    AV regurgitation pressure 1/2 time [id number] ms    AR Max Guru 2.83 m/s    AV mean gradient 8 mmHg    AV peak gradient 13 mmHg    Ao peak guru 1.81 m/s    Ao VTI 33.00 cm    LVOT peak VTI 18.90 cm    AV valve area 1.85 cm²    AV Velocity Ratio 0.56     AV index (prosthetic) 0.57     ZENIA by Velocity Ratio 1.81 cm²    Mr max guru 4.90 m/s    MV mean gradient 4 mmHg    MV peak gradient 12 mmHg    MV stenosis pressure 1/2 time 44.67 ms    MV valve area p 1/2 method 4.93 cm2    MV valve area by continuity eq 1.66 cm2    MV VTI 36.9 cm    TV mean gradient 33 mmHg    Triscuspid Valve Regurgitation Peak Gradient 38 mmHg    Ao root annulus 3.51 cm    STJ 3.32 cm    Ascending aorta 3.26 cm    IVC diameter 2.72 cm    ZLVIDS -4.00     ZLVIDD -8.28     LA Volume Index 69.8 mL/m2    LA volume 171.60 cm3    LA WIDTH 5.4 cm    RA Width 3.8 cm    TV resting pulmonary artery pressure 41 mmHg    RV TB RVSP 6 mmHg    Est. RA pres 3 mmHg    Narrative      Left Ventricle: The left ventricle is severely dilated. Mildly   increased wall thickness. There is moderately reduced systolic function   with a visually estimated ejection fraction of 30 - 35%. There is   diastolic dysfunction.    Right Ventricle: Normal right ventricular cavity size. Wall thickness   is normal. Right ventricle wall motion  is normal. Systolic function is   normal.    Aortic Valve: There is mild aortic regurgitation.    Mitral Valve: There is severe regurgitation.    Pulmonary Artery: The estimated pulmonary artery systolic pressure is   41 mmHg.    IVC/SVC: Normal venous pressure at 3 mmHg.      and EKG: Reviewed

## 2024-04-11 NOTE — PLAN OF CARE
FAIR TOLERANCE TO TX., LIMITED BY PAIN TO RLE, MODA X 2 FOR BED MOBILITY AND TF'S, MODA FOR SHORT DISTANCE GAIT WITH RW.  P.T. DC REC: MODERATE INTENSITY THERAPY

## 2024-04-11 NOTE — PLAN OF CARE
OT cecelia completed. Sup>sit mod A of 2, sit>stand mod A of 2, functional mobility x4ft with RW and mod A, step>pivot to bedside chair with RW and mod A. Recommending moderate intensity intervention at d/c.

## 2024-04-11 NOTE — SUBJECTIVE & OBJECTIVE
Interval History:  Patient seen and examined with family at bedside.  Continues to complain of pain in his right hip as well as shin on the right side.  This has hindering his ability to get out of the bed.    Review of Systems   Constitutional:  Positive for activity change, appetite change and fatigue.   Respiratory:  Negative for cough, choking, chest tightness and shortness of breath.    Cardiovascular:  Negative for chest pain, palpitations and leg swelling.        Chronic venous stasis bilateral lower extremities   Gastrointestinal:  Positive for constipation.   Genitourinary:  Positive for difficulty urinating.   Musculoskeletal:  Positive for arthralgias. Back pain: Right hip.  Skin:  Color change: bilateral lower extremities.   Neurological:  Positive for weakness.   All other systems reviewed and are negative.    Objective:     Vital Signs (Most Recent):  Temp: 98.2 °F (36.8 °C) (04/11/24 1115)  Pulse: 66 (04/11/24 1400)  Resp: 18 (04/11/24 1400)  BP: (!) 91/52 (04/11/24 1400)  SpO2: 95 % (04/11/24 1400) Vital Signs (24h Range):  Temp:  [97.3 °F (36.3 °C)-98.2 °F (36.8 °C)] 98.2 °F (36.8 °C)  Pulse:  [62-75] 66  Resp:  [18-30] 18  SpO2:  [92 %-100 %] 95 %  BP: ()/(52-60) 91/52     Weight: 111.3 kg (245 lb 6 oz)  Body mass index is 34.22 kg/m².    Intake/Output Summary (Last 24 hours) at 4/11/2024 1503  Last data filed at 4/11/2024 1352  Gross per 24 hour   Intake 240 ml   Output 1525 ml   Net -1285 ml         Physical Exam  Vitals reviewed.   Constitutional:       Appearance: Normal appearance. He is obese. He is ill-appearing.   HENT:      Head: Normocephalic and atraumatic.      Mouth/Throat:      Mouth: Mucous membranes are moist.      Pharynx: Oropharynx is clear.   Eyes:      Extraocular Movements: Extraocular movements intact.      Conjunctiva/sclera: Conjunctivae normal.   Cardiovascular:      Rate and Rhythm: Regular rhythm. Bradycardia present.      Pulses: Normal pulses.      Heart sounds:  Normal heart sounds.   Pulmonary:      Effort: Pulmonary effort is normal.      Breath sounds: Normal breath sounds.   Abdominal:      General: Bowel sounds are normal.      Palpations: Abdomen is soft.   Musculoskeletal:      Cervical back: Normal range of motion and neck supple.      Comments: Pain in right hip.   Skin:     General: Skin is warm and dry.      Comments: Chronic lower extremity venous stasis changes with color changes.  No pitting edema   Neurological:      General: No focal deficit present.      Mental Status: He is alert and oriented to person, place, and time. Mental status is at baseline.   Psychiatric:         Mood and Affect: Mood normal.         Behavior: Behavior normal.         Thought Content: Thought content normal.             Significant Labs: All pertinent labs within the past 24 hours have been reviewed.  CBC:   Recent Labs   Lab 04/10/24  0434 04/11/24  0418   WBC 4.83 5.25   HGB 10.6* 9.9*   HCT 32.4* 29.7*    160     CMP:   Recent Labs   Lab 04/10/24  0434 04/11/24 0418   * 130*   K 4.2 3.8   CL 96 96   CO2 31* 30*   GLU 92 95   BUN 27* 26*   CREATININE 1.0 0.9   CALCIUM 8.4* 8.2*   PROT  --  5.8*   ALBUMIN  --  2.4*   BILITOT  --  1.2*   ALKPHOS  --  63   AST  --  42*   ALT  --  26   ANIONGAP 6* 4*       Significant Imaging: I have reviewed all pertinent imaging results/findings within the past 24 hours.

## 2024-04-11 NOTE — ASSESSMENT & PLAN NOTE
Chronic, . Latest blood pressure and vitals reviewed-     Temp:  [97.3 °F (36.3 °C)-98.2 °F (36.8 °C)]   Pulse:  [62-75]   Resp:  [18-30]   BP: ()/(52-60)   SpO2:  [92 %-100 %] .   Home meds for hypertension were reviewed and noted below.   Hypertension Medications               metoprolol succinate (TOPROL-XL) 25 MG 24 hr tablet Take 1 tablet (25 mg total) by mouth once daily.    torsemide (DEMADEX) 20 MG Tab Take 1 tablet (20 mg total) by mouth 2 (two) times a day.            While in the hospital, will manage blood pressure as follows; Lasix 20 mg p.o. q.day, metoprolol 25 mg p.o. b.i.d., isosorbide mononitrate 30 mg p.o. q.day    Will utilize p.r.n. blood pressure medication only if patient's blood pressure greater than 160/100 and he develops symptoms such as worsening chest pain or shortness of breath.

## 2024-04-11 NOTE — ASSESSMENT & PLAN NOTE
Patient is identified as having Diastolic (HFpEF) heart failure that is Acute. CHF is currently uncontrolled due to Pulmonary edema/pleural effusion on CXR. Latest ECHO performed and demonstrates- No results found for this or any previous visit.  . Continue Furosemide and monitor clinical status closely. Monitor on telemetry. Patient is on CHF pathway.  Monitor strict Is&Os and daily weights.  Place on fluid restriction of 2 L. Cardiology has been consulted. Continue to stress to patient importance of self efficacy and  on diet for CHF. Last BNP reviewed- and noted below   Recent Labs   Lab 04/09/24  1335   *     Cw iv lasix     4/3/24  -TTE with EF of 20-25%, moderately reduced RV function, pulmonary HTN, severe MR  -Continue IV diuresis  -Continue BB  -Will add ARB vs Entresto pending creatinine/BP trend  -Strict I's/O's  -Probable R/LHC tmw pending reassessment    4.4.2024  Right and left heart catheterization today.    Lasix held due to drop in pressure yesterday evening.    Discussed risks and benefits with the family, son and daughter at bedside.    4/5/24  -More SOB this AM with cough/orthopnea  -IV Lasix x one dose  -Continue Aldactone, BB    4.6.2024  Continue with balloon pump for today.    We will get a central line.  Discussed with ICU staff.  Check CVP and mixed venous.    Lactic acid normal.    Continue with IV diuresis.     4/8/24  -IABP remains in place  -Continue IV diuresis for now  -Continue BB  -Will optimize regimen further post procedure     4/9/24  -Hold further IV diuresis  -Continue BB  -BNP pending, will attempt to switch to po Lasix and add Entresto tmw    4/11/24  -Stable, po Lasix resumed  -Continue BB  -BP soft, will add ARB vs ARNI if tolerated

## 2024-04-12 VITALS
RESPIRATION RATE: 20 BRPM | OXYGEN SATURATION: 94 % | HEART RATE: 67 BPM | SYSTOLIC BLOOD PRESSURE: 120 MMHG | BODY MASS INDEX: 36.42 KG/M2 | HEIGHT: 71 IN | WEIGHT: 260.13 LBS | TEMPERATURE: 98 F | DIASTOLIC BLOOD PRESSURE: 68 MMHG

## 2024-04-12 LAB
ANION GAP SERPL CALC-SCNC: 5 MMOL/L (ref 8–16)
BASOPHILS # BLD AUTO: 0.01 K/UL (ref 0–0.2)
BASOPHILS NFR BLD: 0.2 % (ref 0–1.9)
BUN SERPL-MCNC: 24 MG/DL (ref 8–23)
CALCIUM SERPL-MCNC: 8.8 MG/DL (ref 8.7–10.5)
CHLORIDE SERPL-SCNC: 96 MMOL/L (ref 95–110)
CO2 SERPL-SCNC: 29 MMOL/L (ref 23–29)
CREAT SERPL-MCNC: 0.8 MG/DL (ref 0.5–1.4)
DIFFERENTIAL METHOD BLD: ABNORMAL
EOSINOPHIL # BLD AUTO: 0 K/UL (ref 0–0.5)
EOSINOPHIL NFR BLD: 0.2 % (ref 0–8)
ERYTHROCYTE [DISTWIDTH] IN BLOOD BY AUTOMATED COUNT: 12.8 % (ref 11.5–14.5)
EST. GFR  (NO RACE VARIABLE): >60 ML/MIN/1.73 M^2
GLUCOSE SERPL-MCNC: 118 MG/DL (ref 70–110)
HCT VFR BLD AUTO: 29 % (ref 40–54)
HGB BLD-MCNC: 9.7 G/DL (ref 14–18)
IMM GRANULOCYTES # BLD AUTO: 0.02 K/UL (ref 0–0.04)
IMM GRANULOCYTES NFR BLD AUTO: 0.4 % (ref 0–0.5)
LYMPHOCYTES # BLD AUTO: 0.4 K/UL (ref 1–4.8)
LYMPHOCYTES NFR BLD: 8 % (ref 18–48)
MAGNESIUM SERPL-MCNC: 2.2 MG/DL (ref 1.6–2.6)
MCH RBC QN AUTO: 32 PG (ref 27–31)
MCHC RBC AUTO-ENTMCNC: 33.4 G/DL (ref 32–36)
MCV RBC AUTO: 96 FL (ref 82–98)
MONOCYTES # BLD AUTO: 0.5 K/UL (ref 0.3–1)
MONOCYTES NFR BLD: 8.7 % (ref 4–15)
NEUTROPHILS # BLD AUTO: 4.4 K/UL (ref 1.8–7.7)
NEUTROPHILS NFR BLD: 82.5 % (ref 38–73)
NRBC BLD-RTO: 0 /100 WBC
PHOSPHATE SERPL-MCNC: 2.6 MG/DL (ref 2.7–4.5)
PLATELET # BLD AUTO: 182 K/UL (ref 150–450)
PMV BLD AUTO: 9.5 FL (ref 9.2–12.9)
POCT GLUCOSE: 110 MG/DL (ref 70–110)
POTASSIUM SERPL-SCNC: 4.3 MMOL/L (ref 3.5–5.1)
RBC # BLD AUTO: 3.03 M/UL (ref 4.6–6.2)
SODIUM SERPL-SCNC: 130 MMOL/L (ref 136–145)
WBC # BLD AUTO: 5.28 K/UL (ref 3.9–12.7)

## 2024-04-12 PROCEDURE — 80048 BASIC METABOLIC PNL TOTAL CA: CPT | Performed by: INTERNAL MEDICINE

## 2024-04-12 PROCEDURE — 25000003 PHARM REV CODE 250: Performed by: INTERNAL MEDICINE

## 2024-04-12 PROCEDURE — 97116 GAIT TRAINING THERAPY: CPT

## 2024-04-12 PROCEDURE — 63600175 PHARM REV CODE 636 W HCPCS: Performed by: INTERNAL MEDICINE

## 2024-04-12 PROCEDURE — 84100 ASSAY OF PHOSPHORUS: CPT | Performed by: INTERNAL MEDICINE

## 2024-04-12 PROCEDURE — 99900035 HC TECH TIME PER 15 MIN (STAT)

## 2024-04-12 PROCEDURE — 85025 COMPLETE CBC W/AUTO DIFF WBC: CPT | Performed by: INTERNAL MEDICINE

## 2024-04-12 PROCEDURE — 99233 SBSQ HOSP IP/OBS HIGH 50: CPT | Mod: ,,, | Performed by: INTERNAL MEDICINE

## 2024-04-12 PROCEDURE — 83735 ASSAY OF MAGNESIUM: CPT | Performed by: INTERNAL MEDICINE

## 2024-04-12 PROCEDURE — 97530 THERAPEUTIC ACTIVITIES: CPT

## 2024-04-12 PROCEDURE — 25000003 PHARM REV CODE 250: Performed by: PHYSICIAN ASSISTANT

## 2024-04-12 RX ORDER — ISOSORBIDE MONONITRATE 30 MG/1
30 TABLET, EXTENDED RELEASE ORAL DAILY
Qty: 30 TABLET | Refills: 0 | Status: SHIPPED | OUTPATIENT
Start: 2024-04-13 | End: 2024-04-12

## 2024-04-12 RX ORDER — BISACODYL 10 MG/1
10 SUPPOSITORY RECTAL DAILY PRN
Refills: 0
Start: 2024-04-12

## 2024-04-12 RX ORDER — AMIODARONE HYDROCHLORIDE 200 MG/1
200 TABLET ORAL 2 TIMES DAILY
Qty: 60 TABLET | Refills: 0
Start: 2024-04-12 | End: 2024-05-22 | Stop reason: SDUPTHER

## 2024-04-12 RX ORDER — TAMSULOSIN HYDROCHLORIDE 0.4 MG/1
0.4 CAPSULE ORAL DAILY
Qty: 30 CAPSULE | Refills: 0 | Status: SHIPPED | OUTPATIENT
Start: 2024-04-13 | End: 2024-04-12

## 2024-04-12 RX ORDER — PREDNISONE 20 MG/1
20 TABLET ORAL DAILY
Qty: 2 TABLET | Refills: 0
Start: 2024-04-13 | End: 2024-05-01 | Stop reason: ALTCHOICE

## 2024-04-12 RX ORDER — FAMOTIDINE 20 MG/1
20 TABLET, FILM COATED ORAL 2 TIMES DAILY
Qty: 60 TABLET | Refills: 0
Start: 2024-04-12 | End: 2024-05-12

## 2024-04-12 RX ORDER — HYDROCODONE BITARTRATE AND ACETAMINOPHEN 5; 325 MG/1; MG/1
1 TABLET ORAL EVERY 8 HOURS PRN
Qty: 15 TABLET | Refills: 0 | Status: SHIPPED | OUTPATIENT
Start: 2024-04-12

## 2024-04-12 RX ORDER — AMIODARONE HYDROCHLORIDE 200 MG/1
200 TABLET ORAL 2 TIMES DAILY
Qty: 60 TABLET | Refills: 0 | Status: SHIPPED | OUTPATIENT
Start: 2024-04-12 | End: 2024-04-12

## 2024-04-12 RX ORDER — ASPIRIN 81 MG/1
81 TABLET ORAL DAILY
Refills: 0
Start: 2024-04-13 | End: 2025-04-13

## 2024-04-12 RX ORDER — POLYETHYLENE GLYCOL 3350 17 G/17G
17 POWDER, FOR SOLUTION ORAL DAILY
Refills: 0
Start: 2024-04-13

## 2024-04-12 RX ORDER — AMOXICILLIN 250 MG
2 CAPSULE ORAL 2 TIMES DAILY
Start: 2024-04-12

## 2024-04-12 RX ORDER — PREDNISONE 20 MG/1
20 TABLET ORAL DAILY
Qty: 2 TABLET | Refills: 0 | Status: SHIPPED | OUTPATIENT
Start: 2024-04-13 | End: 2024-04-12

## 2024-04-12 RX ORDER — HYDROCODONE BITARTRATE AND ACETAMINOPHEN 5; 325 MG/1; MG/1
1 TABLET ORAL EVERY 8 HOURS PRN
Qty: 15 TABLET | Refills: 0 | Status: SHIPPED | OUTPATIENT
Start: 2024-04-12 | End: 2024-04-12

## 2024-04-12 RX ORDER — METOPROLOL TARTRATE 25 MG/1
25 TABLET, FILM COATED ORAL 2 TIMES DAILY
Qty: 60 TABLET | Refills: 0 | Status: SHIPPED | OUTPATIENT
Start: 2024-04-12 | End: 2024-04-12

## 2024-04-12 RX ORDER — NITROGLYCERIN 0.4 MG/1
0.4 TABLET SUBLINGUAL EVERY 5 MIN PRN
Start: 2024-04-12 | End: 2024-04-24 | Stop reason: SDUPTHER

## 2024-04-12 RX ORDER — ISOSORBIDE MONONITRATE 30 MG/1
30 TABLET, EXTENDED RELEASE ORAL DAILY
Qty: 30 TABLET | Refills: 0
Start: 2024-04-13 | End: 2024-05-22 | Stop reason: SDUPTHER

## 2024-04-12 RX ORDER — LIDOCAINE 50 MG/G
1 PATCH TOPICAL DAILY
Qty: 15 PATCH | Refills: 0
Start: 2024-04-12 | End: 2024-05-01

## 2024-04-12 RX ORDER — METOPROLOL TARTRATE 25 MG/1
25 TABLET, FILM COATED ORAL 2 TIMES DAILY
Qty: 60 TABLET | Refills: 0
Start: 2024-04-12 | End: 2024-05-22 | Stop reason: SDUPTHER

## 2024-04-12 RX ORDER — TALC
6 POWDER (GRAM) TOPICAL NIGHTLY PRN
Qty: 30 TABLET | Refills: 0
Start: 2024-04-12 | End: 2024-05-12

## 2024-04-12 RX ORDER — FUROSEMIDE 20 MG/1
20 TABLET ORAL DAILY
Qty: 30 TABLET | Refills: 0
Start: 2024-04-13 | End: 2024-05-22 | Stop reason: SDUPTHER

## 2024-04-12 RX ORDER — CLOPIDOGREL BISULFATE 75 MG/1
75 TABLET ORAL DAILY
Qty: 30 TABLET | Refills: 11
Start: 2024-04-13 | End: 2024-05-23 | Stop reason: SDUPTHER

## 2024-04-12 RX ORDER — FAMOTIDINE 20 MG/1
20 TABLET, FILM COATED ORAL 2 TIMES DAILY
Qty: 60 TABLET | Refills: 0 | Status: SHIPPED | OUTPATIENT
Start: 2024-04-12 | End: 2024-04-12

## 2024-04-12 RX ORDER — TAMSULOSIN HYDROCHLORIDE 0.4 MG/1
0.4 CAPSULE ORAL DAILY
Qty: 30 CAPSULE | Refills: 0
Start: 2024-04-13 | End: 2024-05-23 | Stop reason: SDUPTHER

## 2024-04-12 RX ADMIN — TAMSULOSIN HYDROCHLORIDE 0.4 MG: 0.4 CAPSULE ORAL at 08:04

## 2024-04-12 RX ADMIN — HYDROCODONE BITARTRATE AND ACETAMINOPHEN 1 TABLET: 5; 325 TABLET ORAL at 03:04

## 2024-04-12 RX ADMIN — PREDNISONE 20 MG: 20 TABLET ORAL at 08:04

## 2024-04-12 RX ADMIN — MORPHINE SULFATE 2 MG: 4 INJECTION INTRAVENOUS at 08:04

## 2024-04-12 RX ADMIN — FUROSEMIDE 20 MG: 20 TABLET ORAL at 08:04

## 2024-04-12 RX ADMIN — ASPIRIN 81 MG: 81 TABLET, COATED ORAL at 08:04

## 2024-04-12 RX ADMIN — APIXABAN 5 MG: 2.5 TABLET, FILM COATED ORAL at 08:04

## 2024-04-12 RX ADMIN — METOPROLOL TARTRATE 25 MG: 25 TABLET, FILM COATED ORAL at 08:04

## 2024-04-12 RX ADMIN — CLOPIDOGREL BISULFATE 75 MG: 75 TABLET ORAL at 08:04

## 2024-04-12 RX ADMIN — DOCUSATE SODIUM 50MG AND SENNOSIDES 8.6MG 2 TABLET: 8.6; 5 TABLET, FILM COATED ORAL at 08:04

## 2024-04-12 RX ADMIN — AMIODARONE HYDROCHLORIDE 200 MG: 200 TABLET ORAL at 08:04

## 2024-04-12 RX ADMIN — ATORVASTATIN CALCIUM 40 MG: 40 TABLET, FILM COATED ORAL at 08:04

## 2024-04-12 RX ADMIN — ISOSORBIDE MONONITRATE 30 MG: 30 TABLET, EXTENDED RELEASE ORAL at 08:04

## 2024-04-12 RX ADMIN — FAMOTIDINE 20 MG: 20 TABLET ORAL at 08:04

## 2024-04-12 RX ADMIN — LIDOCAINE 1 PATCH: 50 PATCH CUTANEOUS at 11:04

## 2024-04-12 NOTE — PLAN OF CARE
[facility] - Intensive Care (Hospital)  Discharge Final Note    Primary Care Provider: No, Primary Doctor    Expected Discharge Date: 4/12/2024    Final Discharge Note (most recent)       Final Note - 04/12/24 1341          Final Note    Assessment Type Final Discharge Note     Anticipated Discharge Disposition Rehab Facility     Hospital Resources/Appts/Education Provided Post-Acute resouces added to AVS        Post-Acute Status    Post-Acute Authorization Placement     Post-Acute Placement Status Set-up Complete/Auth obtained     Discharge Delays Ambulance Transport/Facility Transport                     Important Message from Medicare             Contact Info       [facility]    [address] [facility] [id number]       Next Steps: Follow up          DC disposition: [facility]   Family notified: at bedside   Transportation: facility transport,  at 4:00pm  Nurse provided with phone number for report.   All DC info uploaded into [employer].     QASIM Tong

## 2024-04-12 NOTE — PT/OT/SLP PROGRESS
Physical Therapy Treatment    Patient Name:  Chucho Clark   MRN:  [id number]    Recommendations:     Discharge Recommendations: Moderate Intensity Therapy  Discharge Equipment Recommendations: to be determined by next level of care  Barriers to discharge: Decreased caregiver support    Assessment:     Chucho Clark is a 78 y.o. male admitted with a medical diagnosis of Acute on chronic congestive heart failure.  He presents with the following impairments/functional limitations: weakness, impaired endurance, impaired functional mobility, gait instability, impaired balance, decreased safety awareness, decreased coordination, pain, decreased lower extremity function, edema.    Rehab Prognosis: Good; patient would benefit from acute skilled PT services to address these deficits and reach maximum level of function.    Recent Surgery: Procedure(s) (LRB):  Percutaneous coronary intervention- RCA/  (N/A)  Repair, Chronic Total Occlusion, Coronary  Stent, Drug Eluting, Multi Vessel, Coronary  Thrombectomy, Coronary 4 Days Post-Op    Plan:     During this hospitalization, patient to be seen 3 x/week to address the identified rehab impairments via gait training, therapeutic activities, therapeutic exercises and progress toward the following goals:    Plan of Care Expires:  04/24/24    Subjective     Chief Complaint: PAIN WITH MOVEMENT  Patient/Family Comments/goals: AGREEABLE TO OOB MOBILITY  Pain/Comfort:  Pain Rating 1: 8/10  Location - Side 1: Right  Location - Orientation 1: generalized  Location 1: gluteal (RADIATES DOWN LEG WITH MOVEMENT PER PT)- POSSIBLE SCIATIC PAIN  Pain Addressed 1: Reposition  Pain Rating 2: 4/10  Location - Side 2: Right  Location 2: knee  Pain Addressed 2:  (APPLIES ARTHRITIS CREAM PER PT'S REQUEST)      Objective:     Communicated with NURSE STREETER prior to session.  Patient found supine with telemetry, peripheral IV, pulse ox (continuous), PICC line, blood pressure cuff, murray catheter (LIFE  "VEST) upon PT entry to room.     General Precautions: Standard, fall  Orthopedic Precautions: N/A  Braces: N/A  Respiratory Status: Room air     Functional Mobility:  Bed Mobility:     Rolling Left:  minimum assistance  Scooting: minimum assistance  Supine to Sit: moderate assistance  Transfers:     Sit to Stand:  minimum assistance with rolling walker  Bed to Chair: minimum assistance with  rolling walker  using  Step Transfer  Gait: PT AMB 25' WITH RW AND [place], SLOW STEADY PACE, CUES FOR UPRIGHT POSTURE AND RW SAFETY, ENCOURAGED TO USE RW FOR OFFLOADING RLE, QUICK TO FATIGUE, CHAIR IN TOW FOR SAFETY  Balance: FAIR SITTING BALANCE, POOR+ DYNAMIC BALANCE DURING GAIT    AM-PAC 6 CLICK MOBILITY  Turning over in bed (including adjusting bedclothes, sheets and blankets)?: 2  Sitting down on and standing up from a chair with arms (e.g., wheelchair, bedside commode, etc.): 3  Moving from lying on back to sitting on the side of the bed?: 2  Moving to and from a bed to a chair (including a wheelchair)?: 3  Need to walk in hospital room?: 3  Climbing 3-5 steps with a railing?: 1  Basic Mobility Total Score: 14     Treatment & Education:  PT EDUCATION:  - ROLE OF P.T. AND POC IN ACUTE CARE HOSPITAL SETTING  - RW USE AND SAFETY DURING TF'S AND GAIT  - ENCOURAGED TO INCREASE TIME OOB IN CHAIR TO TOLERANCE   - TO CONTINUE THERAPUETIC EXERCISES THROUGHOUT THE DAY TO INCREASE ACTIVITY TOLERANCE AND DECREASE RISK FOR PNEUMONIA AND BLOOD CLOTS: HIP FLEX/EXT, HIP ABD/ADD, QUAD SET, HEEL SLIDE, AP  - RISK FOR FALLS DUE TO GENERALIZED WEAKNESS, EDUCATED ON "CALL DON'T FALL", ENCOURAGED TO CALL FOR ASSISTANCE WITH ALL NEEDS SUCH AS BED<>CHAIR TRANSFERS OR TRIPS TO BATHROOM, PT AGREEABLE TO SAFETY PRECAUTIONS    Patient left up in chair with all lines intact, call button in reach, chair alarm on, and NURSE notified..    GOALS:   Multidisciplinary Problems       Physical Therapy Goals          Problem: Physical Therapy    Goal Priority " Disciplines Outcome Goal Variances Interventions   Physical Therapy Goal     PT, PT/OT Ongoing, Progressing     Description: Goals to be met by 4/24/24.  1. Pt will complete bed mobility MOD I.  2. Pt will complete sit to stand CGA.  3. Pt will ambulate 100ft CGA using RW.  4. Pt will increase AMPAC score by 2 points to progress functional mobility.                       Time Tracking:     PT Received On: 04/12/24  PT Start Time: 0745     PT Stop Time: 0810  PT Total Time (min): 25 min     Billable Minutes: Gait Training 10 and Therapeutic Activity 15    Treatment Type: Treatment  PT/PTA: PT     Number of PTA visits since last PT visit: 0     04/12/2024

## 2024-04-12 NOTE — PT/OT/SLP PROGRESS
"Occupational Therapy   Treatment    Name: Chucho Clark  MRN: [id number]  Admitting Diagnosis:  Acute on chronic congestive heart failure  4 Days Post-Op    Recommendations:     Discharge Recommendations: Moderate Intensity Therapy  Discharge Equipment Recommendations:  to be determined by next level of care  Barriers to discharge:  Decreased caregiver support    Assessment:     Chucho Clark is a 78 y.o. male with a medical diagnosis of Acute on chronic congestive heart failure.  He presents with the following performance deficits affecting function are weakness, impaired endurance, impaired self care skills, impaired functional mobility, impaired balance, impaired cardiopulmonary response to activity, pain, decreased safety awareness, decreased lower extremity function.     Rehab Prognosis:  Good; patient would benefit from acute skilled OT services to address these deficits and reach maximum level of function.       Plan:     Patient to be seen 2 x/week to address the above listed problems via self-care/home management, therapeutic exercises, therapeutic activities  Plan of Care Expires: 04/25/24  Plan of Care Reviewed with: patient    Subjective     Chief Complaint: Reported "The pain begins in my buttock and goes down the leg."  Patient/Family Comments/goals: increase independence  Pain/Comfort:  Pain Rating 1: 8/10  Location - Side 1: Right  Location 1: leg  Pain Addressed 1:  (activity pacing)  Gross pain appears sciatic in nature    Objective:     Communicated with: Nurse, Princess, prior to session.  Patient found supine with peripheral IV, telemetry, pulse ox (continuous), blood pressure cuff, PICC line, murray catheter (life vest) upon OT entry to room.    General Precautions: Standard, fall    Orthopedic Precautions:N/A  Braces: N/A  Respiratory Status: Room air     Occupational Performance:     Bed Mobility:    Patient completed Supine to Sit with moderate assistance     Functional " Mobility/Transfers:  Patient completed Sit <> Stand Transfer with moderate assistance  with  rolling walker   Patient completed Bed <> Chair Transfer using Step Transfer technique with minimum assistance with rolling walker  Functional Mobility: Patient completed x20ft functional mobility with RW and min A to increase dynamic standing balance and activity tolerance needed for ADL completion.  Provided with v/c for technique with transfers to increase safety and independence with completion  Increased focus on safe RW management/use, specifically with transitional movements    Activities of Daily Living:  Grooming: setup patient completed simple grooming while seated in bedside chair with setup for item retrieval.    [facility] 6 Click ADL: 15    Treatment & Education:  Patient tolerated session well overall. Encouraged completion of B UE AROM therex throughout the day to increase functional strength and activity tolerance needed for ADL completion. Educated on benefits of OOB activity and importance of calling for A to transfer back to bed. Patient stated understanding and in agreement with POC.    Patient left up in chair with all lines intact, call button in reach, chair alarm on, and nurse notified    GOALS:   Multidisciplinary Problems       Occupational Therapy Goals          Problem: Occupational Therapy    Goal Priority Disciplines Outcome Interventions   Occupational Therapy Goal     OT, PT/OT Ongoing, Progressing    Description: Goals to be met by: 4/25/24     Patient will increase functional independence with ADLs by performing:    Toileting from bedside commode with Contact Guard Assistance for hygiene and clothing management.   Toilet transfer to bedside commode with Contact Guard Assistance.  Increased functional strength in B UE grossly by 1/2 MM grade.                         Time Tracking:     OT Date of Treatment: 04/12/24  OT Start Time: 0735  OT Stop Time: 0800  OT Total Time (min): 25 min    Billable  Minutes:Therapeutic Activity 25    Lisy Gonzalez, OT  4/12/2024

## 2024-04-12 NOTE — PLAN OF CARE
Tolerated intervention well this date. Improvements in pain noted. Recommending moderate intensity intervention at d/c.

## 2024-04-12 NOTE — NURSING
Report called to [facility].   Pt in NAD, VSS, discharge paperwork printed and reviewed with pt. Ann and Picc left in place for rehab.

## 2024-04-12 NOTE — PLAN OF CARE
Discussed discharge disposition of rehab. Patient is agreeable to rehab admission. [facility] accepted patient. Liaison to meet with pt and family at bedside. Advised DC to rehab could be as early as today if clinically stable for DC.

## 2024-04-12 NOTE — SUBJECTIVE & OBJECTIVE
Review of Systems   Constitutional: Positive for malaise/fatigue.   HENT: Negative.     Eyes: Negative.    Cardiovascular: Negative.    Respiratory: Negative.     Endocrine: Negative.    Hematologic/Lymphatic: Negative.    Skin: Negative.    Musculoskeletal:  Positive for arthritis, back pain, joint pain and stiffness.   Gastrointestinal: Negative.    Genitourinary: Negative.    Neurological:  Positive for weakness.   Psychiatric/Behavioral: Negative.     Allergic/Immunologic: Negative.      Objective:     Vital Signs (Most Recent):  Temp: 98.2 °F (36.8 °C) (04/12/24 0710)  Pulse: 66 (04/12/24 0800)  Resp: 18 (04/12/24 0853)  BP: 124/65 (04/12/24 0800)  SpO2: 96 % (04/12/24 0800) Vital Signs (24h Range):  Temp:  [98 °F (36.7 °C)-98.2 °F (36.8 °C)] 98.2 °F (36.8 °C)  Pulse:  [61-67] 66  Resp:  [16-30] 18  SpO2:  [93 %-97 %] 96 %  BP: ()/(52-65) 124/65     Weight: 118 kg (260 lb 2.3 oz)  Body mass index is 36.28 kg/m².     SpO2: 96 %         Intake/Output Summary (Last 24 hours) at 4/12/2024 1303  Last data filed at 4/12/2024 0638  Gross per 24 hour   Intake 240 ml   Output 1135 ml   Net -895 ml       Lines/Drains/Airways       Peripherally Inserted Central Catheter Line  Duration             PICC Double Lumen 04/06/24 1325 right basilic 5 days              Drain  Duration                  Urethral Catheter 04/11/24 0030 1 day                       Physical Exam  Vitals and nursing note reviewed.   Constitutional:       Appearance: He is well-developed.   HENT:      Head: Normocephalic.   Neck:      Thyroid: No thyromegaly.      Vascular: No carotid bruit or JVD.   Cardiovascular:      Rate and Rhythm: Normal rate and regular rhythm.      Pulses:           Radial pulses are 2+ on the right side and 2+ on the left side.      Heart sounds: S1 normal and S2 normal. Heart sounds not distant. No midsystolic click and no opening snap. No murmur heard.     No friction rub. No S3 or S4 sounds.   Pulmonary:       "Effort: Pulmonary effort is normal.      Breath sounds: Normal breath sounds. No wheezing or rales.   Abdominal:      General: Bowel sounds are normal. There is no distension or abdominal bruit.      Palpations: Abdomen is soft. There is no mass.      Tenderness: There is no abdominal tenderness.   Musculoskeletal:      Cervical back: Neck supple.      Right lower leg: Edema present.      Left lower leg: Edema present.   Skin:     General: Skin is warm.   Neurological:      Mental Status: He is alert and oriented to person, place, and time.   Psychiatric:         Behavior: Behavior normal.            Significant Labs: ABG: No results for input(s): "PH", "PCO2", "HCO3", "POCSATURATED", "BE" in the last 48 hours., Blood Culture: No results for input(s): "LABBLOO" in the last 48 hours., BMP:   Recent Labs   Lab 04/11/24  0418 04/12/24 0331   GLU 95 118*   * 130*   K 3.8 4.3   CL 96 96   CO2 30* 29   BUN 26* 24*   CREATININE 0.9 0.8   CALCIUM 8.2* 8.8   MG 2.0 2.2   , CMP   Recent Labs   Lab 04/11/24  0418 04/12/24  0331   * 130*   K 3.8 4.3   CL 96 96   CO2 30* 29   GLU 95 118*   BUN 26* 24*   CREATININE 0.9 0.8   CALCIUM 8.2* 8.8   PROT 5.8*  --    ALBUMIN 2.4*  --    BILITOT 1.2*  --    ALKPHOS 63  --    AST 42*  --    ALT 26  --    ANIONGAP 4* 5*   , CBC   Recent Labs   Lab 04/11/24  0418 04/12/24 0331   WBC 5.25 5.28   HGB 9.9* 9.7*   HCT 29.7* 29.0*    182   , INR No results for input(s): "INR", "PROTIME" in the last 48 hours., Lipid Panel No results for input(s): "CHOL", "HDL", "LDLCALC", "TRIG", "CHOLHDL" in the last 48 hours., and Troponin No results for input(s): "TROPONINI" in the last 48 hours.    Significant Imaging: Echocardiogram: Transthoracic echo (TTE) complete (Cupid Only):   Results for orders placed or performed during the hospital encounter of 04/02/24   Echo   Result Value Ref Range    BSA 2.55 m2    LVOT stroke volume 61.14 cm3    LVIDd 5.50 3.5 - 6.0 cm    LV Systolic Volume " 93.48 mL    LV Systolic Volume Index 38.0 mL/m2    LVIDs 4.52 (A) 2.1 - 4.0 cm    LV Diastolic Volume 147.39 mL    LV Diastolic Volume Index 59.91 mL/m2    IVS 1.70 (A) 0.6 - 1.1 cm    LVOT diameter 2.03 cm    LVOT area 3.2 cm2    FS 18 (A) 28 - 44 %    Left Ventricle Relative Wall Thickness 0.49 cm    Posterior Wall 1.35 (A) 0.6 - 1.1 cm    LV mass 382.20 g    LV Mass Index 155 g/m2    MV Peak E Guru 1.29 m/s    MV Peak A Guru 0.49 m/s    TR Max Guru 3.07 m/s    E/A ratio 2.63     E wave deceleration time 154.04 msec    LVOT peak guru 1.01 m/s    Left Ventricular Outflow Tract Mean Velocity 0.94 cm/s    Left Ventricular Outflow Tract Mean Gradient 3.53 mmHg    TAPSE 2.25 cm    LA size 5.05 cm    Left Atrium Minor Axis 7.53 cm    Left Atrium Major Axis 7.28 cm    RA Major Axis 5.96 cm    AV regurgitation pressure 1/2 time [id number] ms    AR Max Guru 2.83 m/s    AV mean gradient 8 mmHg    AV peak gradient 13 mmHg    Ao peak guru 1.81 m/s    Ao VTI 33.00 cm    LVOT peak VTI 18.90 cm    AV valve area 1.85 cm²    AV Velocity Ratio 0.56     AV index (prosthetic) 0.57     ZENIA by Velocity Ratio 1.81 cm²    Mr max guru 4.90 m/s    MV mean gradient 4 mmHg    MV peak gradient 12 mmHg    MV stenosis pressure 1/2 time 44.67 ms    MV valve area p 1/2 method 4.93 cm2    MV valve area by continuity eq 1.66 cm2    MV VTI 36.9 cm    TV mean gradient 33 mmHg    Triscuspid Valve Regurgitation Peak Gradient 38 mmHg    Ao root annulus 3.51 cm    STJ 3.32 cm    Ascending aorta 3.26 cm    IVC diameter 2.72 cm    ZLVIDS -4.00     ZLVIDD -8.28     LA Volume Index 69.8 mL/m2    LA volume 171.60 cm3    LA WIDTH 5.4 cm    RA Width 3.8 cm    TV resting pulmonary artery pressure 41 mmHg    RV TB RVSP 6 mmHg    Est. RA pres 3 mmHg    Narrative      Left Ventricle: The left ventricle is severely dilated. Mildly   increased wall thickness. There is moderately reduced systolic function   with a visually estimated ejection fraction of 30 - 35%.  There is   diastolic dysfunction.    Right Ventricle: Normal right ventricular cavity size. Wall thickness   is normal. Right ventricle wall motion  is normal. Systolic function is   normal.    Aortic Valve: There is mild aortic regurgitation.    Mitral Valve: There is severe regurgitation.    Pulmonary Artery: The estimated pulmonary artery systolic pressure is   41 mmHg.    IVC/SVC: Normal venous pressure at 3 mmHg.

## 2024-04-15 ENCOUNTER — TELEPHONE (OUTPATIENT)
Dept: CARDIOLOGY | Facility: CLINIC | Age: 79
End: 2024-04-15
Payer: MEDICARE

## 2024-04-15 NOTE — TELEPHONE ENCOUNTER
I called [facility] to see about scheduling pts f/u appt with [facility] . No answer. LM for nurse to return my call.

## 2024-04-18 ENCOUNTER — TELEPHONE (OUTPATIENT)
Dept: CARDIOLOGY | Facility: CLINIC | Age: 79
End: 2024-04-18
Payer: MEDICARE

## 2024-04-18 DIAGNOSIS — I50.9 ACUTE ON CHRONIC CONGESTIVE HEART FAILURE, UNSPECIFIED HEART FAILURE TYPE: Primary | ICD-10-CM

## 2024-04-18 NOTE — TELEPHONE ENCOUNTER
Spoke with [facility]. Pt will be d/c early next week around 4/23.   No c/o any worsening HF symptoms at this time.   [facility] nurse scheduled pt appt with [facility] for hosp f/u.

## 2024-04-23 ENCOUNTER — TELEPHONE (OUTPATIENT)
Dept: CARDIOLOGY | Facility: CLINIC | Age: 79
End: 2024-04-23
Payer: MEDICARE

## 2024-04-23 NOTE — TELEPHONE ENCOUNTER
Spoke with Home health nurse in regards to wanting to know if Dr. Corrales would be okay with following this pt's home health. Any future concerns or any orders will be sent to Dr. Corrales if he is okay with this.                       ----- Message from Eileen Cleveland sent at 4/23/2024  1:58 PM CDT -----  Type:  Patient Returning Call    Who Called:Amrita with HH     Does the patient know what this is regarding?:Home Health service   Would the patient rather a call back or a response via [facility]? Call   Best Call Back Number:  Additional Information:

## 2024-04-24 ENCOUNTER — OFFICE VISIT (OUTPATIENT)
Dept: CARDIOLOGY | Facility: CLINIC | Age: 79
End: 2024-04-24
Payer: MEDICARE

## 2024-04-24 VITALS
DIASTOLIC BLOOD PRESSURE: 60 MMHG | HEIGHT: 71 IN | BODY MASS INDEX: 36.67 KG/M2 | HEART RATE: 66 BPM | WEIGHT: 261.94 LBS | SYSTOLIC BLOOD PRESSURE: 100 MMHG

## 2024-04-24 DIAGNOSIS — I50.9 ACUTE ON CHRONIC CONGESTIVE HEART FAILURE, UNSPECIFIED HEART FAILURE TYPE: Primary | ICD-10-CM

## 2024-04-24 DIAGNOSIS — I25.10 CORONARY ARTERY DISEASE INVOLVING NATIVE CORONARY ARTERY OF NATIVE HEART, UNSPECIFIED WHETHER ANGINA PRESENT: Primary | ICD-10-CM

## 2024-04-24 DIAGNOSIS — I25.112 CORONARY ARTERY DISEASE INVOLVING NATIVE HEART WITH REFRACTORY ANGINA PECTORIS, UNSPECIFIED VESSEL OR LESION TYPE: ICD-10-CM

## 2024-04-24 DIAGNOSIS — I50.43 SYSTOLIC AND DIASTOLIC CHF, ACUTE ON CHRONIC: ICD-10-CM

## 2024-04-24 PROCEDURE — 99999 PR PBB SHADOW E&M-EST. PATIENT-LVL IV: CPT | Mod: PBBFAC,,, | Performed by: PHYSICIAN ASSISTANT

## 2024-04-24 PROCEDURE — 99205 OFFICE O/P NEW HI 60 MIN: CPT | Mod: S$PBB,,, | Performed by: PHYSICIAN ASSISTANT

## 2024-04-24 PROCEDURE — 99214 OFFICE O/P EST MOD 30 MIN: CPT | Mod: PBBFAC | Performed by: PHYSICIAN ASSISTANT

## 2024-04-24 RX ORDER — NITROGLYCERIN 0.4 MG/1
0.4 TABLET SUBLINGUAL EVERY 5 MIN PRN
Qty: 25 TABLET | Refills: 0 | Status: SHIPPED | OUTPATIENT
Start: 2024-04-24 | End: 2025-04-24

## 2024-04-24 NOTE — PROGRESS NOTES
[facility] Provider Note (Initial Clinic) Consult Note    Date of original referral: 4/13/2024  Age: 78 y.o.  Gender: male  Ethnicity: Not  or /a   Number of admissions for CHF within the preceding year: 1   Duration of CHF:   Type of Congestive Heart Failure: Systolic   Etiology: Ischemic   Social history:   Enrolled in Infusion suite: no    Diagnostic Labs:   EKG - 04/06/2024  CXR - 04/06/2024  ECHO - 04/05/2024  Stress test -   Stress echo -   Pharmacologic stress -   Cardiac catheterization - 04/08/2024   Cardiac MRI -     Lab Results   Component Value Date     (L) 04/12/2024     (L) 04/11/2024    K 4.3 04/12/2024    K 3.8 04/11/2024    CL 96 04/12/2024    CL 96 04/11/2024    CO2 29 04/12/2024    CO2 30 (H) 04/11/2024     (H) 04/12/2024    GLU 95 04/11/2024    BUN 24 (H) 04/12/2024    BUN 26 (H) 04/11/2024    CREATININE 0.8 04/12/2024    CREATININE 0.9 04/11/2024    CALCIUM 8.8 04/12/2024    CALCIUM 8.2 (L) 04/11/2024    PROT 5.8 (L) 04/11/2024    PROT 6.7 04/04/2024    ALBUMIN 2.4 (L) 04/11/2024    ALBUMIN 3.2 (L) 04/04/2024    BILITOT 1.2 (H) 04/11/2024    BILITOT 0.9 04/04/2024    ALKPHOS 63 04/11/2024    ALKPHOS 58 04/04/2024    AST 42 (H) 04/11/2024    AST 17 04/04/2024    ALT 26 04/11/2024    ALT 15 04/04/2024    ANIONGAP 5 (L) 04/12/2024    ANIONGAP 4 (L) 04/11/2024    ESTGFRAFRICA >60.0 12/18/2020    ESTGFRAFRICA 108 07/10/2020    EGFRNONAA >60.0 12/18/2020       Lab Results   Component Value Date    WBC 5.28 04/12/2024    WBC 5.25 04/11/2024    RBC 3.03 (L) 04/12/2024    RBC 3.11 (L) 04/11/2024    HGB 9.7 (L) 04/12/2024    HGB 9.9 (L) 04/11/2024    HCT 29.0 (L) 04/12/2024    HCT 29.7 (L) 04/11/2024    MCV 96 04/12/2024    MCV 96 04/11/2024    MCH 32.0 (H) 04/12/2024    MCH 31.8 (H) 04/11/2024    MCHC 33.4 04/12/2024    MCHC 33.3 04/11/2024    RDW 12.8 04/12/2024    RDW 13.1 04/11/2024     04/12/2024     04/11/2024    MPV 9.5 04/12/2024    MPV 9.4 04/11/2024     IMMGR 0.4 04/12/2024    IMMGR 0.4 04/11/2024    IGABS 0.02 04/12/2024    IGABS 0.02 04/11/2024    LYMPH 0.4 (L) 04/12/2024    LYMPH 8.0 (L) 04/12/2024    MONO 0.5 04/12/2024    MONO 8.7 04/12/2024    EOS 0.0 04/12/2024    EOS 0.1 04/11/2024    BASO 0.01 04/12/2024    BASO 0.01 04/11/2024    NRBC 0 04/12/2024    NRBC 0 04/11/2024    GRAN 4.4 04/12/2024    GRAN 82.5 (H) 04/12/2024    EOSINOPHIL 0.2 04/12/2024    EOSINOPHIL 1.3 04/11/2024    BASOPHIL 0.2 04/12/2024    BASOPHIL 0.2 04/11/2024    PLTEST Appears normal 04/10/2024       Lab Results   Component Value Date     (H) 04/09/2024     (H) 04/02/2024    MG 2.2 04/12/2024    MG 2.0 04/11/2024    PHOS 2.6 (L) 04/12/2024    PHOS 2.6 (L) 04/11/2024    TROPONINI 0.064 (H) 04/02/2024    TROPONINI 0.054 (H) 04/02/2024    HGBA1C 5.2 04/02/2024    TSH 0.654 04/02/2024    TSH 1.04 08/12/2020       Lab Results   Component Value Date    CHOL 178 07/08/2020    TRIG 90 07/08/2020    HDL 39 (L) 07/08/2020    LDLCALC 121 07/08/2020    NONHDLCHOL 139 07/08/2020       List all implanted cardiac devices:   LV    Current Outpatient Medications on File Prior to Visit   Medication Sig Dispense Refill    amiodarone (PACERONE) 200 MG Tab Take 1 tablet (200 mg total) by mouth 2 (two) times daily. 60 tablet 0    apixaban (ELIQUIS) 5 mg Tab Take 1 tablet (5 mg total) by mouth 2 (two) times daily. 60 tablet 5    aspirin (ECOTRIN) 81 MG EC tablet Take 1 tablet (81 mg total) by mouth once daily.  0    clopidogreL (PLAVIX) 75 mg tablet Take 1 tablet (75 mg total) by mouth once daily. 30 tablet 11    furosemide (LASIX) 20 MG tablet Take 1 tablet (20 mg total) by mouth once daily. 30 tablet 0    isosorbide mononitrate (IMDUR) 30 MG 24 hr tablet Take 1 tablet (30 mg total) by mouth once daily. 30 tablet 0    metoprolol tartrate (LOPRESSOR) 25 MG tablet Take 1 tablet (25 mg total) by mouth 2 (two) times daily. 60 tablet 0    rosuvastatin (CRESTOR) 10 MG tablet Take 1 tablet (10 mg  total) by mouth once daily. 90 tablet 3    tamsulosin (FLOMAX) 0.4 mg Cap Take 1 capsule (0.4 mg total) by mouth once daily. 30 capsule 0    bisacodyL (DULCOLAX) 10 mg Supp Place 1 suppository (10 mg total) rectally daily as needed (Until bowel movement if patient has no bowel movement for 2 days).  0    famotidine (PEPCID) 20 MG tablet Take 1 tablet (20 mg total) by mouth 2 (two) times a day. 60 tablet 0    HYDROcodone-acetaminophen (NORCO) 5-325 mg per tablet Take 1 tablet by mouth every 8 (eight) hours as needed for Pain. 15 tablet 0    LIDOcaine (LIDODERM) 5 % Place 1 patch onto the skin once daily. Remove & Discard patch within 12 hours or as directed by MD for 15 days 15 patch 0    melatonin (MELATIN) 3 mg tablet Take 2 tablets (6 mg total) by mouth nightly as needed for Insomnia. 30 tablet 0    nitroGLYCERIN (NITROSTAT) 0.4 MG SL tablet Place 1 tablet (0.4 mg total) under the tongue every 5 (five) minutes as needed for Chest pain.      polyethylene glycol (GLYCOLAX) 17 gram PwPk Take 17 g by mouth once daily.  0    senna-docusate 8.6-50 mg (PERICOLACE) 8.6-50 mg per tablet Take 2 tablets by mouth 2 (two) times daily.       No current facility-administered medications on file prior to visit.         HPI: 78 y.o. male patient, PMHx: Diastolic HF, HTN, DVT, CAD. Presented to the Emergency Department for evaluation of CP which onset night PTA. Pt visited Dr. Corrales (Cardiology) for the first time on 4/1/24 and was initiated on Eliquis. Pt notes that he also felt some upper abd pain PTA that felt like acid reflux. Pt and son originally thought sxs were cold/flu sxs, but tested negative in the clinic. Symptoms are constant and moderate in severity. No mitigating or exacerbating factors reported. Associated sxs include SOB, palpitations, and increased HR. Patient denies any cough, numbness, HA, fever, n/v/d, and all other sxs at this time. Pt has not had a stress test. In the ED, vitals: 130/69, 121, 22, 97.8F, 95%  RA. Significant Labs: BNP: 560, Trop: 0.064, Bili: 1.1. CXR: interstitial edema. EKG: Neg for STEMI. Cardiology consulted in ED. Treated with ASA, Nitro, BB, diuretic. Initiated on Heparin Infusion. Patient is a full code. Placed in observation under the care of Hospital Medicine   4/3/24-Patient seen and examined today, resting in bed. Feeling better. Less SOB, diuresed well overnight. Still has significant BLE edema. Labs reviewed/stable. EKG with aflutter. TTE with EF of 20-25%, decreased RV function, severe MR. Troponin flat. Discussed ischemic workup possibly tmw pending clinical condition.      4.4.2024  Still in aflutter, swelling significantly improved   In bed, laying comfortably     4/5/24-Patient seen and examined today, s/p R/LHC yesterday which showed severe triple vessel disease. Underwent RODERICK/DCCV with restoration of SR. Feels ok. Does have some increase in SOB/orthopnea. No CP symptoms. Remains in SR. Reviewed case with patient/CTS, will proceed with repeat LHC today and PCI. LifeVest ordered for SCD prevention.     4.7.2024  Still in aflutter  Discussed with son and patient , sister   Agreeable with dccv     4/8/24-Patient seen and examined today, resting in bed. Family at bedside. Feels ok. SOB stable. No CP. Remains in SR. IABP in place. Labs reviewed. Na 132, creatinine 1.1. Repeat LHC with RCA intervention planned today by Dr. Mayers.     4/9/24-Patient seen and examined today, s/p LHC yesterday with successful PCI of RCA. IABP removed this AM. Patient feels ok, does admit to fatigue. No CP or SOB. Labs reviewed/stable. BP soft, BNP pending. Will likely switch to po Lasix tmw.     4/10/24: Pt seen and examined in ICU this am. No acute issues noted.  No angina/CHF issues.  Labs/chart reviewed. Pt states  he feels ok today.  BP soft.     4/11/24-Patient seen and examined today, sitting up in bedside chair. Ambulated earlier. Complains of back/buttock/left shin pain. No SOB or CP. BP ok, po Lasix  initiated. Remains in SR. Labs reviewed.      4/12/24: pt seen and examined in ICU this am. No acute issues noted. Stable CV status. Chart/labs reviewed.  Going to rehab next step.     [facility] yesterday, med list has torsemide and lasix    On plavix and eliquis    BB for GDMT    No ARNi or ARB?     Less SOB, walking more    No PND        PHYSICAL:   Vitals:    04/24/24 1252   BP: 100/60   Pulse: 66          JVD: no,    Heart rhythm: regular  Cardiac murmur: No    S3: no  S4: no  Lungs: clear  Liver span: 10 cm:   Hepatojugular reflux: no  Edema: no,       ASSESSMENT: acute systolic HF    PLAN:      Patient Instructions:   Instruct the patient to notify this clinic if HH, a physician or an advanced care provider wants to change medication one of their HF medications   Activity and Diet restrictions:   Recommend 2-3 gram sodium restriction and 1500cc- 2000cc fluid restriction.  Encourage physical activity with graded exercise program.  Requested patient to weigh themselves daily, and to notify us if their weight increases by more than 3 lbs in 1 day or 5 lbs in 3 days.    Assigned dry weight on home scale: 118 kg  Medication changes (include current dose and changed dose)  Echo in 3 months for AICD  Continue PT  Extensive CHF eecution done  Stop torsemide  Continue lasix 20 mg daily  RTC 3 weeks with labs

## 2024-05-01 ENCOUNTER — OFFICE VISIT (OUTPATIENT)
Dept: INTERNAL MEDICINE | Facility: CLINIC | Age: 79
End: 2024-05-01
Payer: MEDICARE

## 2024-05-01 VITALS
HEART RATE: 67 BPM | TEMPERATURE: 98 F | OXYGEN SATURATION: 97 % | WEIGHT: 266.13 LBS | DIASTOLIC BLOOD PRESSURE: 74 MMHG | HEIGHT: 71 IN | SYSTOLIC BLOOD PRESSURE: 110 MMHG | BODY MASS INDEX: 37.26 KG/M2

## 2024-05-01 DIAGNOSIS — I48.92 ATRIAL FLUTTER, UNSPECIFIED TYPE: ICD-10-CM

## 2024-05-01 DIAGNOSIS — I70.0 ATHEROSCLEROSIS OF AORTA: ICD-10-CM

## 2024-05-01 DIAGNOSIS — I51.89 GRADE II DIASTOLIC DYSFUNCTION: ICD-10-CM

## 2024-05-01 DIAGNOSIS — I87.2 VENOUS INSUFFICIENCY: ICD-10-CM

## 2024-05-01 DIAGNOSIS — I87.2 VENOUS STASIS DERMATITIS OF BOTH LOWER EXTREMITIES: ICD-10-CM

## 2024-05-01 DIAGNOSIS — I89.0 LYMPHEDEMA: ICD-10-CM

## 2024-05-01 DIAGNOSIS — Z09 HOSPITAL DISCHARGE FOLLOW-UP: Primary | ICD-10-CM

## 2024-05-01 DIAGNOSIS — I10 ESSENTIAL HYPERTENSION: ICD-10-CM

## 2024-05-01 DIAGNOSIS — I50.9 ACUTE ON CHRONIC CONGESTIVE HEART FAILURE, UNSPECIFIED HEART FAILURE TYPE: ICD-10-CM

## 2024-05-01 PROCEDURE — 99999 PR PBB SHADOW E&M-EST. PATIENT-LVL V: CPT | Mod: PBBFAC,,, | Performed by: PHYSICIAN ASSISTANT

## 2024-05-01 PROCEDURE — 99214 OFFICE O/P EST MOD 30 MIN: CPT | Mod: S$PBB,,, | Performed by: PHYSICIAN ASSISTANT

## 2024-05-01 PROCEDURE — 99215 OFFICE O/P EST HI 40 MIN: CPT | Mod: PBBFAC | Performed by: PHYSICIAN ASSISTANT

## 2024-05-01 NOTE — PROGRESS NOTES
Subjective:      Patient ID: Chucho Clark is a 78 y.o. male.    Chief Complaint: Follow-up    HPI  Here today for a hospital follow up for acute on chronic CHF. Second hospitalization in the past 5 months for CHF.    Presented to the ED at [facility] on 4/2/24 for evaluation of chest pain. Patient has visited Dr. Corrales on 4/1/2024 and initiated on Eliquis. Upon arrival to the ED he was treated with aspirin, nitro, beta-blocker diuretics and initiated on heparin infusion. Patient reported that he had progressive chest pain and shortness of breath over the previous 2 weeks and has been unable to lay flat. He was noted to have significant bilateral lower extremity edema as well as elevated troponin and volume overload. Echo revealed an EF of 20 to 25%. He was aggressively diuresed and a left heart cath revealed severe triple-vessel disease. He underwent RODERICK with cardioversion and normal sinus rhythm was restored. He underwent repeat heart cath on 4/5/2024 and had PCI to LAD and Mid left circumflex. He developed flash pulmonary edema and was placed on a balloon pump in ICU. He underwent another LHC on 4/8/24 with stent placement x 3 and balloon pump was removed. He was subsequently taken off his heparin drip and participated with PT/OT in the acute setting. It was determined that he would benefit from inpatient therapy and he was therefore transferred to [facility] on 4/12/2024    Doing great after [facility]. Discharge last week. No longer needing walker. Had home health eval and he passed on having home health. Lives with son at home.   Denies any shortness of breath or chest pain. Able to lay flat without any problems.   Wearing a life vest.     Scheduled to establish with DR. Rodriguez.   Seen by heart failure clinic last week. Follow up scheduled.     Needs handicap form.     Go low diet.   Wearing compression socks and fluid has been stable.   Completed pt/ot. Doing exercises at home.     Wt  Readings from Last 3 Encounters:   05/01/24 0948 120.7 kg (266 lb 1.5 oz)   04/24/24 1252 118.8 kg (261 lb 14.5 oz)   04/12/24 0332 118 kg (260 lb 2.3 oz)   04/10/24 1056 111.3 kg (245 lb 6 oz)   04/10/24 0600 111.3 kg (245 lb 6 oz)   04/08/24 0600 117.1 kg (258 lb 2.5 oz)   04/05/24 0742 129.7 kg (286 lb)   04/03/24 0406 123.4 kg (272 lb 0.8 oz)   04/02/24 1002 129.7 kg (286 lb)   04/02/24 0741 129.8 kg (286 lb 2.5 oz)      Patient Active Problem List   Diagnosis    Atherosclerosis of aorta    Coronary artery disease involving native coronary artery of native heart    Chronic deep vein thrombosis (DVT) of distal vein of left lower extremity    Essential hypertension    Lymphedema    Venous stasis dermatitis of both lower extremities    Venous insufficiency    Grade II diastolic dysfunction    Morbid obesity with BMI of 45.0-49.9, adult    Meralgia paraesthetica, right    Acute on chronic congestive heart failure    Elevated troponin    Chest pain    Orthopnea    Atrial flutter    Left bundle branch block    Hip pain, acute, right         Current Outpatient Medications:     amiodarone (PACERONE) 200 MG Tab, Take 1 tablet (200 mg total) by mouth 2 (two) times daily., Disp: 60 tablet, Rfl: 0    apixaban (ELIQUIS) 5 mg Tab, Take 1 tablet (5 mg total) by mouth 2 (two) times daily., Disp: 60 tablet, Rfl: 5    aspirin (ECOTRIN) 81 MG EC tablet, Take 1 tablet (81 mg total) by mouth once daily., Disp: , Rfl: 0    bisacodyL (DULCOLAX) 10 mg Supp, Place 1 suppository (10 mg total) rectally daily as needed (Until bowel movement if patient has no bowel movement for 2 days)., Disp: , Rfl: 0    clopidogreL (PLAVIX) 75 mg tablet, Take 1 tablet (75 mg total) by mouth once daily., Disp: 30 tablet, Rfl: 11    famotidine (PEPCID) 20 MG tablet, Take 1 tablet (20 mg total) by mouth 2 (two) times a day., Disp: 60 tablet, Rfl: 0    furosemide (LASIX) 20 MG tablet, Take 1 tablet (20 mg total) by mouth once daily., Disp: 30 tablet, Rfl: 0     HYDROcodone-acetaminophen (NORCO) 5-325 mg per tablet, Take 1 tablet by mouth every 8 (eight) hours as needed for Pain., Disp: 15 tablet, Rfl: 0    isosorbide mononitrate (IMDUR) 30 MG 24 hr tablet, Take 1 tablet (30 mg total) by mouth once daily., Disp: 30 tablet, Rfl: 0    melatonin (MELATIN) 3 mg tablet, Take 2 tablets (6 mg total) by mouth nightly as needed for Insomnia., Disp: 30 tablet, Rfl: 0    metoprolol tartrate (LOPRESSOR) 25 MG tablet, Take 1 tablet (25 mg total) by mouth 2 (two) times daily., Disp: 60 tablet, Rfl: 0    nitroGLYCERIN (NITROSTAT) 0.4 MG SL tablet, Place 1 tablet (0.4 mg total) under the tongue every 5 (five) minutes as needed for Chest pain., Disp: 25 tablet, Rfl: 0    polyethylene glycol (GLYCOLAX) 17 gram PwPk, Take 17 g by mouth once daily., Disp: , Rfl: 0    rosuvastatin (CRESTOR) 10 MG tablet, Take 1 tablet (10 mg total) by mouth once daily., Disp: 90 tablet, Rfl: 3    senna-docusate 8.6-50 mg (PERICOLACE) 8.6-50 mg per tablet, Take 2 tablets by mouth 2 (two) times daily., Disp: , Rfl:     tamsulosin (FLOMAX) 0.4 mg Cap, Take 1 capsule (0.4 mg total) by mouth once daily., Disp: 30 capsule, Rfl: 0    Review of Systems   Constitutional:  Negative for activity change, appetite change, chills, diaphoresis, fatigue, fever and unexpected weight change.   HENT: Negative.  Negative for congestion, hearing loss, postnasal drip, rhinorrhea, sore throat, trouble swallowing and voice change.    Eyes: Negative.  Negative for visual disturbance.   Respiratory: Negative.  Negative for cough, choking, chest tightness and shortness of breath.    Cardiovascular:  Positive for leg swelling. Negative for chest pain and palpitations.   Gastrointestinal:  Negative for abdominal distention, abdominal pain, blood in stool, constipation, diarrhea, nausea and vomiting.   Endocrine: Negative for cold intolerance, heat intolerance, polydipsia and polyuria.   Genitourinary: Negative.  Negative for difficulty  "urinating and frequency.   Musculoskeletal:  Negative for arthralgias, back pain, gait problem, joint swelling and myalgias.   Skin:  Negative for color change, pallor, rash and wound.   Neurological:  Negative for dizziness, tremors, weakness, light-headedness, numbness and headaches.   Hematological:  Negative for adenopathy.   Psychiatric/Behavioral:  Negative for behavioral problems, confusion, self-injury, sleep disturbance and suicidal ideas. The patient is not nervous/anxious.      Objective:   /74 (BP Location: Left arm, Patient Position: Sitting, BP Method: Large (Manual))   Pulse 67   Temp 97.7 °F (36.5 °C) (Tympanic)   Ht 5' 11" (1.803 m)   Wt 120.7 kg (266 lb 1.5 oz)   SpO2 97%   BMI 37.11 kg/m²     Physical Exam  Vitals and nursing note reviewed.   Constitutional:       General: He is not in acute distress.     Appearance: Normal appearance. He is well-developed. He is not ill-appearing, toxic-appearing or diaphoretic.   HENT:      Head: Normocephalic and atraumatic.   Cardiovascular:      Rate and Rhythm: Normal rate and regular rhythm.      Heart sounds: Normal heart sounds. No murmur heard.     No friction rub. No gallop.   Pulmonary:      Effort: Pulmonary effort is normal. No respiratory distress.      Breath sounds: Normal breath sounds. No wheezing or rales.   Musculoskeletal:      Right lower leg: No tenderness. 3+ Pitting Edema present.      Left lower leg: No tenderness. 3+ Pitting Edema present.      Comments: Bilateral, symmetric lower extremity edema. Wearing compression stockings today   Skin:     General: Skin is warm.      Findings: No rash.   Neurological:      Mental Status: He is alert and oriented to person, place, and time.   Psychiatric:         Mood and Affect: Mood normal.         Behavior: Behavior normal.         Thought Content: Thought content normal.         Judgment: Judgment normal.         Assessment:     1. Hospital discharge follow-up    2. Acute on chronic " congestive heart failure, unspecified heart failure type    3. Atherosclerosis of aorta    4. Essential hypertension    5. Lymphedema    6. Venous stasis dermatitis of both lower extremities    7. Grade II diastolic dysfunction    8. Venous insufficiency    9. Atrial flutter, unspecified type      Plan:   Hospital discharge follow-up    Acute on chronic congestive heart failure, unspecified heart failure type    Atherosclerosis of aorta    Essential hypertension    Lymphedema    Venous stasis dermatitis of both lower extremities    Grade II diastolic dysfunction    Venous insufficiency    Atrial flutter, unspecified type    -lungs clear on exam today  -decline lymphedema clinic. Will discuss with cardiologist first  -declines nutritionist.   -permanent handicap form filled out.   -bp stable and controlled on current medications. Heart in good rhythm today  -medical problems stable.     Follow up if symptoms worsen or fail to improve.

## 2024-05-06 NOTE — DISCHARGE SUMMARY
[facility] - Intensive Care ([facility])  [facility]  Discharge Summary      Patient Name: Chucho Clark  MRN: [id number]  [facility]: [id number]  Patient Class: IP- Inpatient  Admission Date: 4/2/2024  Hospital Length of Stay: 9 days  Discharge Date and Time: 4/12/2024  4:29 PM  Attending Physician: No att. providers found   Discharging Provider: Rosalina Santillan MD  Primary Care Provider: Nic Rodriguez MD    Primary Care Team: [facility] MEDICINE D    HPI:   78 y.o. male patient, PMHx: Diastolic HF, HTN, DVT, CAD. Presented to the Emergency Department for evaluation of CP which onset night PTA. Pt visited Dr. Corrales (Cardiology) for the first time on 4/1/24 and was initiated on Eliquis. Pt notes that he also felt some upper abd pain PTA that felt like acid reflux. Pt and son originally thought sxs were cold/flu sxs, but tested negative in the clinic. Symptoms are constant and moderate in severity. No mitigating or exacerbating factors reported. Associated sxs include SOB, palpitations, and increased HR. Patient denies any cough, numbness, HA, fever, n/v/d, and all other sxs at this time. Pt has not had a stress test. In the ED, vitals: 130/69, 121, 22, 97.8F, 95% RA. Significant Labs: BNP: 560, Trop: 0.064, Bili: 1.1. CXR: interstitial edema. EKG: Neg for STEMI. Cardiology consulted in ED. Treated with ASA, Nitro, BB, diuretic. Initiated on Heparin Infusion. Patient is a full code. Placed in observation under the care of Hospital Medicine for management of elevated troponin, ACS rule out.     Procedure(s) (LRB):  Percutaneous coronary intervention- RCA/  (N/A)  Repair, Chronic Total Occlusion, Coronary  Stent, Drug Eluting, Multi Vessel, Coronary  Thrombectomy, Coronary      Hospital Course:   The patient presented with chest pain and shortness of breath. He reported he had been unable to lay flat for 2 weeks prior to presentation. He was noted to have bilateral LE edema as well. Workup revealed elevated  troponin and volume overload. He was placed on IV diuretics. Cardiology was consulted. Echo revealed reduced EF. He diuresed well. Left heart cath revealed severe triple vessel disease. He underwent RODERICK/DCCV and sinus rhythm restored. The patient went for repeat C 4/5.  He had PCI to the mid left circ x1 in the mid LAD x1.  He developed flash pulmonary edema required noninvasive ventilation as well as diuretics.  He was placed on the balloon pump and transferred back to ICU.  Subsequently went back into AFib and had repeat cardioversion on 04/07.  Cardiac catheterization on 04/08 with 3 stents placed and balloon pump removed.  Patient has been taken off his heparin drip is feeling well able to sit in the bedside chair with no shortness a breath or chest pain.  He was deemed stable to transitioned to telemetry floor and Hospital Medicine was consulted to assume care.    Pt stable on telemetry and remained in sinus rhythm.   Pt seen and exmined on day of discharge and stable for dc.     Goals of Care Treatment Preferences:  Code Status: Full Code      Consults:   Consults (From admission, onward)          Status Ordering Provider     Inpatient consult to Social Work  Once        Provider:  (Not yet assigned)    Completed SAMIRA SUN     Inpatient consult to Critical Care Medicine  Once        Provider:  Raffi Gonzalez MD    Completed TIANNA HERNANDEZ     Inpatient consult to Social Work/Case Management  Once        Provider:  (Not yet assigned)    Completed KO MATTHEWS     Inpatient consult to Social Work/Case Management  Once        Provider:  (Not yet assigned)    Completed MARCELA HORVATH     Inpatient consult to Registered Dietitian/Nutritionist  Once        Provider:  (Not yet assigned)    Completed MARCELA HORVATH     Inpatient consult to Cardiology  Once        Provider:  Tianna Hernandez MD    Completed JOCELYN FLORES JR            No new Assessment & Plan notes have been filed  under this hospital service since the last note was generated.  Service: Hospital Medicine    Final Active Diagnoses:    Diagnosis Date Noted POA    PRINCIPAL PROBLEM:  Acute on chronic congestive heart failure [I50.9] 04/02/2024 Yes    Hip pain, acute, right [M25.551] 04/10/2024 No    Left bundle branch block [I44.7] 04/06/2024 Yes    Atrial flutter [I48.92] 04/03/2024 Yes    Elevated troponin [R79.89] 04/02/2024 Yes    Chest pain [R07.9] 04/02/2024 Yes    Orthopnea [R06.01] 04/02/2024 Yes    Coronary artery disease involving native coronary artery of native heart [I25.10] 03/13/2024 Yes    Essential hypertension [I10] 07/15/2020 Yes    Venous stasis dermatitis of both lower extremities [I87.2] 07/15/2020 Yes      Problems Resolved During this Admission:    Diagnosis Date Noted Date Resolved POA    On intra-aortic balloon pump assist [Z98.890] 04/06/2024 04/10/2024 Not Applicable    BMI 39.0-39.9,adult [Z68.39] 04/05/2024 04/11/2024 Not Applicable       Discharged Condition: fair    Disposition: Rehab Facility    Follow Up:   Follow-up Information       [facility] Follow up.    Contact information:  [address] [address]                         Patient Instructions:      Basic metabolic panel   Standing Status: Future Standing Exp. Date: 07/11/25     CBC auto differential   Standing Status: Future Standing Exp. Date: 07/11/25     Ambulatory referral/consult to Congestive Heart Failure Clinic   Standing Status: Future   Referral Priority: Routine Referral Type: Consultation   Referral Reason: Specialty Services Required   Requested Specialty: Cardiology   Number of Visits Requested: 1     Diet Cardiac   Order Comments: 1200cc fluid restriction     Call MD for:  temperature >100.4     Call MD for:  persistent nausea and vomiting or diarrhea     Call MD for:  severe uncontrolled pain     Call MD for:  redness, tenderness, or signs of infection (pain, swelling,  redness, odor or green/yellow discharge around incision site)     Call MD for:  difficulty breathing or increased cough     Call MD for:  severe persistent headache     Call MD for:  worsening rash     Call MD for:  persistent dizziness, light-headedness, or visual disturbances     Call MD for:  increased confusion or weakness     Activity as tolerated   Order Comments: Pt/ot eval and treat       Significant Diagnostic Studies: Labs: All labs within the past 24 hours have been reviewed    Pending Diagnostic Studies:       Procedure Component Value Units Date/Time    APTT [[id number]] Collected: 04/06/24 1553    Order Status: Sent Lab Status: No result     Specimen: Blood            Medications:  Reconciled Home Medications:      Medication List        START taking these medications      amiodarone 200 MG Tab  Commonly known as: PACERONE  Take 1 tablet (200 mg total) by mouth 2 (two) times daily.     aspirin 81 MG EC tablet  Commonly known as: ECOTRIN  Take 1 tablet (81 mg total) by mouth once daily.     bisacodyL 10 mg Supp  Commonly known as: DULCOLAX  Place 1 suppository (10 mg total) rectally daily as needed (Until bowel movement if patient has no bowel movement for 2 days).     clopidogreL 75 mg tablet  Commonly known as: PLAVIX  Take 1 tablet (75 mg total) by mouth once daily.     famotidine 20 MG tablet  Commonly known as: PEPCID  Take 1 tablet (20 mg total) by mouth 2 (two) times a day.     furosemide 20 MG tablet  Commonly known as: LASIX  Take 1 tablet (20 mg total) by mouth once daily.     HYDROcodone-acetaminophen 5-325 mg per tablet  Commonly known as: NORCO  Take 1 tablet by mouth every 8 (eight) hours as needed for Pain.     isosorbide mononitrate 30 MG 24 hr tablet  Commonly known as: IMDUR  Take 1 tablet (30 mg total) by mouth once daily.     melatonin 3 mg tablet  Commonly known as: MELATIN  Take 2 tablets (6 mg total) by mouth nightly as needed for Insomnia.     metoprolol tartrate 25 MG  tablet  Commonly known as: LOPRESSOR  Take 1 tablet (25 mg total) by mouth 2 (two) times daily.     polyethylene glycol 17 gram Pwpk  Commonly known as: GLYCOLAX  Take 17 g by mouth once daily.     senna-docusate 8.6-50 mg 8.6-50 mg per tablet  Commonly known as: PERICOLACE  Take 2 tablets by mouth 2 (two) times daily.     tamsulosin 0.4 mg Cap  Commonly known as: FLOMAX  Take 1 capsule (0.4 mg total) by mouth once daily.            CONTINUE taking these medications      apixaban 5 mg Tab  Commonly known as: ELIQUIS  Take 1 tablet (5 mg total) by mouth 2 (two) times daily.     rosuvastatin 10 MG tablet  Commonly known as: CRESTOR  Take 1 tablet (10 mg total) by mouth once daily.            STOP taking these medications      metoprolol succinate 25 MG 24 hr tablet  Commonly known as: TOPROL-XL     torsemide 20 MG Tab  Commonly known as: DEMADEX              Indwelling Lines/Drains at time of discharge:   Lines/Drains/Airways       Peripherally Inserted Central Catheter Line  Duration             PICC Double Lumen 04/06/24 1325 right basilic 29 days                    Time spent on the discharge of patient: 38 minutes        Rosalina Santillan MD  Department of Hospital Medicine  [facility] - Intensive Care ([facility])

## 2024-05-22 ENCOUNTER — LAB VISIT (OUTPATIENT)
Dept: LAB | Facility: HOSPITAL | Age: 79
End: 2024-05-22
Attending: PHYSICIAN ASSISTANT
Payer: MEDICARE

## 2024-05-22 ENCOUNTER — OFFICE VISIT (OUTPATIENT)
Dept: CARDIOLOGY | Facility: CLINIC | Age: 79
End: 2024-05-22
Payer: MEDICARE

## 2024-05-22 VITALS
DIASTOLIC BLOOD PRESSURE: 58 MMHG | WEIGHT: 259.69 LBS | SYSTOLIC BLOOD PRESSURE: 112 MMHG | BODY MASS INDEX: 36.35 KG/M2 | HEART RATE: 60 BPM | HEIGHT: 71 IN

## 2024-05-22 DIAGNOSIS — I82.5Z2 CHRONIC DEEP VEIN THROMBOSIS (DVT) OF DISTAL VEIN OF LEFT LOWER EXTREMITY: ICD-10-CM

## 2024-05-22 DIAGNOSIS — I50.23 ACUTE ON CHRONIC SYSTOLIC CONGESTIVE HEART FAILURE: Primary | ICD-10-CM

## 2024-05-22 DIAGNOSIS — I50.9 ACUTE ON CHRONIC CONGESTIVE HEART FAILURE, UNSPECIFIED HEART FAILURE TYPE: ICD-10-CM

## 2024-05-22 DIAGNOSIS — I10 ESSENTIAL HYPERTENSION: ICD-10-CM

## 2024-05-22 LAB
ANION GAP SERPL CALC-SCNC: 6 MMOL/L (ref 8–16)
BUN SERPL-MCNC: 22 MG/DL (ref 8–23)
CALCIUM SERPL-MCNC: 9.3 MG/DL (ref 8.7–10.5)
CHLORIDE SERPL-SCNC: 103 MMOL/L (ref 95–110)
CO2 SERPL-SCNC: 30 MMOL/L (ref 23–29)
CREAT SERPL-MCNC: 1.2 MG/DL (ref 0.5–1.4)
EST. GFR  (NO RACE VARIABLE): >60 ML/MIN/1.73 M^2
GLUCOSE SERPL-MCNC: 88 MG/DL (ref 70–110)
POTASSIUM SERPL-SCNC: 4.3 MMOL/L (ref 3.5–5.1)
SODIUM SERPL-SCNC: 139 MMOL/L (ref 136–145)

## 2024-05-22 PROCEDURE — 99214 OFFICE O/P EST MOD 30 MIN: CPT | Mod: S$PBB,,, | Performed by: PHYSICIAN ASSISTANT

## 2024-05-22 PROCEDURE — 80048 BASIC METABOLIC PNL TOTAL CA: CPT | Performed by: PHYSICIAN ASSISTANT

## 2024-05-22 PROCEDURE — 99212 OFFICE O/P EST SF 10 MIN: CPT | Mod: PBBFAC | Performed by: PHYSICIAN ASSISTANT

## 2024-05-22 PROCEDURE — 99999 PR PBB SHADOW E&M-EST. PATIENT-LVL II: CPT | Mod: PBBFAC,,, | Performed by: PHYSICIAN ASSISTANT

## 2024-05-22 PROCEDURE — 36415 COLL VENOUS BLD VENIPUNCTURE: CPT | Performed by: PHYSICIAN ASSISTANT

## 2024-05-22 RX ORDER — AMIODARONE HYDROCHLORIDE 200 MG/1
200 TABLET ORAL 2 TIMES DAILY
Qty: 60 TABLET | Refills: 3 | Status: SHIPPED | OUTPATIENT
Start: 2024-05-22 | End: 2024-09-19

## 2024-05-22 RX ORDER — ISOSORBIDE MONONITRATE 30 MG/1
30 TABLET, EXTENDED RELEASE ORAL DAILY
Qty: 30 TABLET | Refills: 3 | Status: SHIPPED | OUTPATIENT
Start: 2024-05-22 | End: 2024-06-16 | Stop reason: SDUPTHER

## 2024-05-22 RX ORDER — FUROSEMIDE 20 MG/1
20 TABLET ORAL DAILY
Qty: 30 TABLET | Refills: 3 | Status: SHIPPED | OUTPATIENT
Start: 2024-05-22 | End: 2024-06-16 | Stop reason: SDUPTHER

## 2024-05-22 RX ORDER — METOPROLOL TARTRATE 25 MG/1
25 TABLET, FILM COATED ORAL 2 TIMES DAILY
Qty: 60 TABLET | Refills: 3 | Status: SHIPPED | OUTPATIENT
Start: 2024-05-22 | End: 2024-06-16 | Stop reason: SDUPTHER

## 2024-05-22 NOTE — PROGRESS NOTES
[facility] TCC Provider Note (Follow-up) Consult Note      HPI: 78 y.o. male patient, PMHx: Diastolic HF, HTN, DVT, CAD. Presented to the Emergency Department for evaluation of CP which onset night PTA. Pt visited Dr. Corrales (Cardiology) for the first time on 4/1/24 and was initiated on Eliquis.        Here for 1 month follow up    On lasix responds well    Wearing LV, no SOB, PND    No LE edema    On GDMT that BP allows  No LH      PHYSICAL:   Vitals:    05/22/24 0846   BP: (!) 112/58   Pulse: 60       JVD: no,    Heart rhythm: regular  Cardiac murmur: No    S3: no  S4: no  Lungs: clear  Liver span: 10 cm:   Hepatojugular reflux: no  Edema: no,       ASSESSMENT:   Chronic systolic HF  PLAN:      Patient Instructions:   Instruct the patient to notify this clinic if HH, a physician or an advanced care provider wants to change medication one of their HF medications   Activity and Diet restrictions:   Recommend 2-3 gram sodium restriction and 1500cc- 2000cc fluid restriction.  Encourage physical activity with graded exercise program.  Requested patient to weigh themselves daily, and to notify us if their weight increases by more than 3 lbs in 1 day or 5 lbs in 3 days.    Assigned dry weight on home scale: 117 kg  Medication changes (include current dose and changed dose)  Echo in July for ref for AICD  Wearing LV  Continue lasix daily  Reill meds  CHf education done  RTC 3 months  '

## 2024-05-23 ENCOUNTER — PATIENT MESSAGE (OUTPATIENT)
Dept: CARDIOLOGY | Facility: CLINIC | Age: 79
End: 2024-05-23
Payer: MEDICARE

## 2024-05-23 DIAGNOSIS — I25.10 CORONARY ARTERY DISEASE INVOLVING NATIVE CORONARY ARTERY OF NATIVE HEART, UNSPECIFIED WHETHER ANGINA PRESENT: Primary | ICD-10-CM

## 2024-05-24 ENCOUNTER — PATIENT MESSAGE (OUTPATIENT)
Dept: CARDIOLOGY | Facility: CLINIC | Age: 79
End: 2024-05-24
Payer: MEDICARE

## 2024-05-24 RX ORDER — TAMSULOSIN HYDROCHLORIDE 0.4 MG/1
0.4 CAPSULE ORAL DAILY
Qty: 30 CAPSULE | Refills: 3 | Status: SHIPPED | OUTPATIENT
Start: 2024-05-24 | End: 2024-06-16 | Stop reason: SDUPTHER

## 2024-05-24 RX ORDER — CLOPIDOGREL BISULFATE 75 MG/1
75 TABLET ORAL DAILY
Qty: 30 TABLET | Refills: 11 | Status: SHIPPED | OUTPATIENT
Start: 2024-05-24 | End: 2024-06-16 | Stop reason: SDUPTHER

## 2024-06-16 DIAGNOSIS — I25.10 CORONARY ARTERY DISEASE INVOLVING NATIVE CORONARY ARTERY OF NATIVE HEART, UNSPECIFIED WHETHER ANGINA PRESENT: ICD-10-CM

## 2024-06-17 RX ORDER — TAMSULOSIN HYDROCHLORIDE 0.4 MG/1
0.4 CAPSULE ORAL DAILY
Qty: 30 CAPSULE | Refills: 3 | Status: SHIPPED | OUTPATIENT
Start: 2024-06-17 | End: 2024-10-15

## 2024-06-17 RX ORDER — ISOSORBIDE MONONITRATE 30 MG/1
30 TABLET, EXTENDED RELEASE ORAL DAILY
Qty: 30 TABLET | Refills: 3 | Status: SHIPPED | OUTPATIENT
Start: 2024-06-17 | End: 2024-10-15

## 2024-06-17 RX ORDER — METOPROLOL TARTRATE 25 MG/1
25 TABLET, FILM COATED ORAL 2 TIMES DAILY
Qty: 60 TABLET | Refills: 3 | Status: SHIPPED | OUTPATIENT
Start: 2024-06-17 | End: 2024-10-15

## 2024-06-17 RX ORDER — FUROSEMIDE 20 MG/1
20 TABLET ORAL DAILY
Qty: 30 TABLET | Refills: 3 | Status: SHIPPED | OUTPATIENT
Start: 2024-06-17 | End: 2024-10-15

## 2024-06-17 RX ORDER — CLOPIDOGREL BISULFATE 75 MG/1
75 TABLET ORAL DAILY
Qty: 30 TABLET | Refills: 11 | Status: SHIPPED | OUTPATIENT
Start: 2024-06-17 | End: 2025-06-17

## 2024-06-24 RX ORDER — AMIODARONE HYDROCHLORIDE 200 MG/1
200 TABLET ORAL 2 TIMES DAILY
Qty: 60 TABLET | Refills: 3 | Status: SHIPPED | OUTPATIENT
Start: 2024-06-24 | End: 2024-10-22

## 2024-07-10 ENCOUNTER — HOSPITAL ENCOUNTER (OUTPATIENT)
Dept: CARDIOLOGY | Facility: HOSPITAL | Age: 79
Discharge: HOME | End: 2024-07-10
Attending: PHYSICIAN ASSISTANT
Payer: MEDICARE

## 2024-07-10 VITALS
HEIGHT: 71 IN | DIASTOLIC BLOOD PRESSURE: 58 MMHG | BODY MASS INDEX: 36.26 KG/M2 | SYSTOLIC BLOOD PRESSURE: 112 MMHG | WEIGHT: 259 LBS

## 2024-07-10 DIAGNOSIS — I50.23 ACUTE ON CHRONIC SYSTOLIC CONGESTIVE HEART FAILURE: ICD-10-CM

## 2024-07-10 LAB
AORTIC ROOT ANNULUS: 3.66 CM
AV INDEX (PROSTH): 0.4
AV MEAN GRADIENT: 13 MMHG
AV PEAK GRADIENT: 24 MMHG
AV REGURGITATION PRESSURE HALF TIME: 1333.87 MS
AV VALVE AREA BY VELOCITY RATIO: 1.65 CM²
AV VALVE AREA: 1.76 CM²
AV VELOCITY RATIO: 0.37
BSA FOR ECHO PROCEDURE: 2.43 M2
CV ECHO LV RWT: 0.45 CM
DOP CALC AO PEAK VEL: 2.43 M/S
DOP CALC AO VTI: 61.1 CM
DOP CALC LVOT AREA: 4.4 CM2
DOP CALC LVOT DIAMETER: 2.38 CM
DOP CALC LVOT PEAK VEL: 0.9 M/S
DOP CALC LVOT STROKE VOLUME: 107.61 CM3
DOP CALC RVOT PEAK VEL: 0.9 M/S
DOP CALC RVOT VTI: 24.3 CM
DOP CALCLVOT PEAK VEL VTI: 24.2 CM
E WAVE DECELERATION TIME: 182.9 MSEC
E/A RATIO: 1.2
E/E' RATIO: 23.09 M/S
ECHO LV POSTERIOR WALL: 1.29 CM (ref 0.6–1.1)
EJECTION FRACTION: 40 %
FRACTIONAL SHORTENING: 23 % (ref 28–44)
INTERVENTRICULAR SEPTUM: 1.58 CM (ref 0.6–1.1)
IVRT: 121.79 MSEC
LA MAJOR: 8.48 CM
LA MINOR: 8.71 CM
LA WIDTH: 5.9 CM
LEFT ATRIUM AREA SYSTOLIC (APICAL 2 CHAMBER): 37.14 CM2
LEFT ATRIUM AREA SYSTOLIC (APICAL 4 CHAMBER): 39.26 CM2
LEFT ATRIUM SIZE: 5.1 CM
LEFT ATRIUM VOLUME INDEX MOD: 68.4 ML/M2
LEFT ATRIUM VOLUME INDEX: 93.5 ML/M2
LEFT ATRIUM VOLUME MOD: 160.72 CM3
LEFT ATRIUM VOLUME: 219.79 CM3
LEFT INTERNAL DIMENSION IN SYSTOLE: 4.45 CM (ref 2.1–4)
LEFT VENTRICLE DIASTOLIC VOLUME INDEX: 69.4 ML/M2
LEFT VENTRICLE DIASTOLIC VOLUME: 163.09 ML
LEFT VENTRICLE END SYSTOLIC VOLUME APICAL 2 CHAMBER: 151.74 ML
LEFT VENTRICLE END SYSTOLIC VOLUME APICAL 4 CHAMBER: 156.98 ML
LEFT VENTRICLE MASS INDEX: 160 G/M2
LEFT VENTRICLE SYSTOLIC VOLUME INDEX: 38.3 ML/M2
LEFT VENTRICLE SYSTOLIC VOLUME: 90 ML
LEFT VENTRICULAR INTERNAL DIMENSION IN DIASTOLE: 5.75 CM (ref 3.5–6)
LEFT VENTRICULAR MASS: 375.33 G
LV LATERAL E/E' RATIO: 18.14 M/S
LV SEPTAL E/E' RATIO: 31.75 M/S
LVED V (TEICH): 163.09 ML
LVES V (TEICH): 90 ML
LVOT MG: 1.97 MMHG
LVOT MV: 0.65 CM/S
MV PEAK A VEL: 1.06 M/S
MV PEAK E VEL: 1.27 M/S
OHS CV RV/LV RATIO: 0.8 CM
PISA AR MAX VEL: 3.83 M/S
PULM VEIN S/D RATIO: 2.65
PV MEAN GRADIENT: 2 MMHG
PV PEAK D VEL: 0.23 M/S
PV PEAK GRADIENT: 3 MMHG
PV PEAK S VEL: 0.61 M/S
PV PEAK VELOCITY: 0.93 M/S
RA MAJOR: 7.56 CM
RA PRESSURE ESTIMATED: 3 MMHG
RA WIDTH: 5.79 CM
RIGHT VENTRICULAR END-DIASTOLIC DIMENSION: 4.61 CM
SINUS: 3.57 CM
STJ: 3.58 CM
TDI LATERAL: 0.07 M/S
TDI SEPTAL: 0.04 M/S
TDI: 0.06 M/S
TRICUSPID ANNULAR PLANE SYSTOLIC EXCURSION: 2.18 CM
Z-SCORE OF LEFT VENTRICULAR DIMENSION IN END DIASTOLE: -5.36
Z-SCORE OF LEFT VENTRICULAR DIMENSION IN END SYSTOLE: -2.24

## 2024-07-10 PROCEDURE — 93306 TTE W/DOPPLER COMPLETE: CPT | Mod: 26,,, | Performed by: INTERNAL MEDICINE

## 2024-07-10 PROCEDURE — 93306 TTE W/DOPPLER COMPLETE: CPT

## 2024-07-19 DIAGNOSIS — I25.10 CORONARY ARTERY DISEASE INVOLVING NATIVE CORONARY ARTERY OF NATIVE HEART, UNSPECIFIED WHETHER ANGINA PRESENT: ICD-10-CM

## 2024-07-22 RX ORDER — FUROSEMIDE 20 MG/1
20 TABLET ORAL DAILY
Qty: 30 TABLET | Refills: 3 | Status: SHIPPED | OUTPATIENT
Start: 2024-07-22 | End: 2024-11-19

## 2024-07-22 RX ORDER — ISOSORBIDE MONONITRATE 30 MG/1
30 TABLET, EXTENDED RELEASE ORAL DAILY
Qty: 30 TABLET | Refills: 3 | Status: SHIPPED | OUTPATIENT
Start: 2024-07-22 | End: 2024-11-19

## 2024-07-22 RX ORDER — METOPROLOL TARTRATE 25 MG/1
25 TABLET, FILM COATED ORAL 2 TIMES DAILY
Qty: 60 TABLET | Refills: 3 | Status: SHIPPED | OUTPATIENT
Start: 2024-07-22 | End: 2024-11-19

## 2024-07-22 RX ORDER — AMIODARONE HYDROCHLORIDE 200 MG/1
200 TABLET ORAL 2 TIMES DAILY
Qty: 60 TABLET | Refills: 3 | Status: SHIPPED | OUTPATIENT
Start: 2024-07-22 | End: 2024-11-19

## 2024-07-22 RX ORDER — CLOPIDOGREL BISULFATE 75 MG/1
75 TABLET ORAL DAILY
Qty: 30 TABLET | Refills: 11 | Status: SHIPPED | OUTPATIENT
Start: 2024-07-22 | End: 2025-07-22

## 2024-07-22 RX ORDER — TAMSULOSIN HYDROCHLORIDE 0.4 MG/1
0.4 CAPSULE ORAL DAILY
Qty: 30 CAPSULE | Refills: 3 | Status: SHIPPED | OUTPATIENT
Start: 2024-07-22 | End: 2024-11-19

## 2024-08-12 ENCOUNTER — OFFICE VISIT (OUTPATIENT)
Dept: CARDIOLOGY | Facility: CLINIC | Age: 79
End: 2024-08-12
Payer: MEDICARE

## 2024-08-12 VITALS
DIASTOLIC BLOOD PRESSURE: 68 MMHG | BODY MASS INDEX: 37.56 KG/M2 | HEART RATE: 58 BPM | WEIGHT: 268.31 LBS | HEIGHT: 71 IN | SYSTOLIC BLOOD PRESSURE: 144 MMHG

## 2024-08-12 DIAGNOSIS — I50.23 ACUTE ON CHRONIC SYSTOLIC CONGESTIVE HEART FAILURE: Primary | ICD-10-CM

## 2024-08-12 DIAGNOSIS — I10 ESSENTIAL HYPERTENSION: ICD-10-CM

## 2024-08-12 DIAGNOSIS — I25.118 CORONARY ARTERY DISEASE OF NATIVE ARTERY OF NATIVE HEART WITH STABLE ANGINA PECTORIS: ICD-10-CM

## 2024-08-12 PROCEDURE — 99214 OFFICE O/P EST MOD 30 MIN: CPT | Mod: S$PBB,,, | Performed by: PHYSICIAN ASSISTANT

## 2024-08-12 PROCEDURE — 99213 OFFICE O/P EST LOW 20 MIN: CPT | Mod: PBBFAC | Performed by: PHYSICIAN ASSISTANT

## 2024-08-12 PROCEDURE — 99999 PR PBB SHADOW E&M-EST. PATIENT-LVL III: CPT | Mod: PBBFAC,,, | Performed by: PHYSICIAN ASSISTANT

## 2024-08-12 NOTE — PROGRESS NOTES
[facility] TCC Provider Note (Follow-up) Consult Note      HPI:  78 y.o. male patient, PMHx: Diastolic HF, HTN, DVT, CAD. Presented to the Emergency Department for evaluation of CP which onset night PTA. Pt visited Dr. Corrales (Cardiology) for the first time on 4/1/24 and was initiated on Eliquis.                    Here for 3 month follow up     On lasix responds well     EF improved  no SOB, PND     No LE edema     On GDMT that BP allows  No LH        PHYSICAL:   Vitals:    08/12/24 0906   BP: (!) 144/68   Pulse: (!) 58          JVD: no,    Heart rhythm: regular  Cardiac murmur: No    S3: no  S4: no  Lungs: clear  Liver span: 10 cm:   Hepatojugular reflux: no  Edema: no,       ASSESSMENT: chronic systolic HF  121 kg    PLAN:      Patient Instructions:   Instruct the patient to notify this clinic if HH, a physician or an advanced care provider wants to change medication one of their HF medications   Activity and Diet restrictions:   Recommend 2-3 gram sodium restriction and 1500cc- 2000cc fluid restriction.  Encourage physical activity with graded exercise program.  Requested patient to weigh themselves daily, and to notify us if their weight increases by more than 3 lbs in 1 day or 5 lbs in 3 days.    Assigned dry weight on home scale: 121 kg  Medication changes (include current dose and changed dose)  CHF education done  Continue lasix daily  GDMT  RTC 6 months

## 2024-12-11 RX ORDER — FUROSEMIDE 20 MG/1
20 TABLET ORAL
Qty: 30 TABLET | Refills: 3 | Status: SHIPPED | OUTPATIENT
Start: 2024-12-11

## 2024-12-20 DIAGNOSIS — I70.0 ATHEROSCLEROSIS OF AORTA: ICD-10-CM

## 2024-12-20 DIAGNOSIS — I25.10 CORONARY ARTERY DISEASE INVOLVING NATIVE CORONARY ARTERY OF NATIVE HEART WITHOUT ANGINA PECTORIS: ICD-10-CM

## 2024-12-20 RX ORDER — TAMSULOSIN HYDROCHLORIDE 0.4 MG/1
1 CAPSULE ORAL
Qty: 30 CAPSULE | Refills: 0 | Status: SHIPPED | OUTPATIENT
Start: 2024-12-20

## 2024-12-20 RX ORDER — METOPROLOL TARTRATE 25 MG/1
25 TABLET, FILM COATED ORAL 2 TIMES DAILY
Qty: 60 TABLET | Refills: 3 | Status: SHIPPED | OUTPATIENT
Start: 2024-12-20 | End: 2025-04-19

## 2024-12-20 RX ORDER — METOPROLOL TARTRATE 25 MG/1
25 TABLET, FILM COATED ORAL 2 TIMES DAILY
Qty: 60 TABLET | Refills: 0 | Status: SHIPPED | OUTPATIENT
Start: 2024-12-20 | End: 2025-04-19

## 2024-12-20 RX ORDER — TAMSULOSIN HYDROCHLORIDE 0.4 MG/1
0.4 CAPSULE ORAL DAILY
Qty: 30 CAPSULE | Refills: 3 | Status: SHIPPED | OUTPATIENT
Start: 2024-12-20 | End: 2025-04-19

## 2024-12-23 RX ORDER — ROSUVASTATIN CALCIUM 10 MG/1
10 TABLET, COATED ORAL DAILY
Qty: 90 TABLET | Refills: 0 | Status: SHIPPED | OUTPATIENT
Start: 2024-12-23 | End: 2025-12-23

## 2024-12-24 DIAGNOSIS — I48.92 ATRIAL FLUTTER, UNSPECIFIED TYPE: Primary | ICD-10-CM

## 2024-12-24 NOTE — TELEPHONE ENCOUNTER
----- Message from Katie sent at 12/24/2024  8:46 AM CST -----  Regarding: Refill  Contact: patient  Type:  RX Refill Request    Who Called: Chucho   Refill or New Rx: refill   RX Name and Strength:amiodarone (PACERONE) 200 MG Tab  How is the patient currently taking it? (ex. 1XDay):twice daily   Is this a 30 day or 90 day RX:30  Preferred Pharmacy with phone number: [facility] [address], [place] [phone]  Local or Mail Order:local   Ordering Provider: Dr Garcia   Would the patient rather a call back or a response via [facility]?  Call back   Best Call Back Number:  [phone] (home)       Additional Information: His regular pharmacy is closed for the Holidays    ThanksLEV

## 2024-12-26 RX ORDER — AMIODARONE HYDROCHLORIDE 200 MG/1
200 TABLET ORAL 2 TIMES DAILY
Qty: 60 TABLET | Refills: 3 | Status: SHIPPED | OUTPATIENT
Start: 2024-12-26 | End: 2025-04-25

## 2025-02-12 ENCOUNTER — OFFICE VISIT (OUTPATIENT)
Dept: CARDIOLOGY | Facility: CLINIC | Age: 80
End: 2025-02-12
Payer: MEDICARE

## 2025-02-12 VITALS
HEART RATE: 60 BPM | SYSTOLIC BLOOD PRESSURE: 160 MMHG | BODY MASS INDEX: 37.52 KG/M2 | WEIGHT: 268 LBS | HEIGHT: 71 IN | DIASTOLIC BLOOD PRESSURE: 72 MMHG

## 2025-02-12 DIAGNOSIS — I25.10 CORONARY ARTERY DISEASE INVOLVING NATIVE CORONARY ARTERY OF NATIVE HEART WITHOUT ANGINA PECTORIS: ICD-10-CM

## 2025-02-12 DIAGNOSIS — I50.9 ACUTE ON CHRONIC CONGESTIVE HEART FAILURE, UNSPECIFIED HEART FAILURE TYPE: ICD-10-CM

## 2025-02-12 DIAGNOSIS — I50.23 ACUTE ON CHRONIC SYSTOLIC CONGESTIVE HEART FAILURE: ICD-10-CM

## 2025-02-12 DIAGNOSIS — I25.10 CORONARY ARTERY DISEASE INVOLVING NATIVE CORONARY ARTERY OF NATIVE HEART, UNSPECIFIED WHETHER ANGINA PRESENT: ICD-10-CM

## 2025-02-12 DIAGNOSIS — I70.0 ATHEROSCLEROSIS OF AORTA: ICD-10-CM

## 2025-02-12 DIAGNOSIS — I82.5Z2 CHRONIC DEEP VEIN THROMBOSIS (DVT) OF DISTAL VEIN OF LEFT LOWER EXTREMITY: ICD-10-CM

## 2025-02-12 DIAGNOSIS — I25.118 CORONARY ARTERY DISEASE OF NATIVE ARTERY OF NATIVE HEART WITH STABLE ANGINA PECTORIS: Primary | ICD-10-CM

## 2025-02-12 DIAGNOSIS — E66.01 MORBID OBESITY WITH BMI OF 45.0-49.9, ADULT: ICD-10-CM

## 2025-02-12 DIAGNOSIS — I48.92 ATRIAL FLUTTER, UNSPECIFIED TYPE: ICD-10-CM

## 2025-02-12 PROCEDURE — 99213 OFFICE O/P EST LOW 20 MIN: CPT | Mod: PBBFAC | Performed by: PHYSICIAN ASSISTANT

## 2025-02-12 PROCEDURE — 99214 OFFICE O/P EST MOD 30 MIN: CPT | Mod: S$PBB,,, | Performed by: PHYSICIAN ASSISTANT

## 2025-02-12 PROCEDURE — 99999 PR PBB SHADOW E&M-EST. PATIENT-LVL III: CPT | Mod: PBBFAC,,, | Performed by: PHYSICIAN ASSISTANT

## 2025-02-12 RX ORDER — AMIODARONE HYDROCHLORIDE 200 MG/1
200 TABLET ORAL DAILY
Qty: 30 TABLET | Refills: 6 | Status: SHIPPED | OUTPATIENT
Start: 2025-02-12 | End: 2025-10-10

## 2025-02-12 RX ORDER — METOPROLOL TARTRATE 25 MG/1
25 TABLET, FILM COATED ORAL 2 TIMES DAILY
Qty: 60 TABLET | Refills: 6 | Status: SHIPPED | OUTPATIENT
Start: 2025-02-12 | End: 2025-06-12

## 2025-02-12 RX ORDER — ISOSORBIDE MONONITRATE 30 MG/1
30 TABLET, EXTENDED RELEASE ORAL DAILY
Qty: 30 TABLET | Refills: 6 | Status: SHIPPED | OUTPATIENT
Start: 2025-02-12 | End: 2025-06-12

## 2025-02-12 RX ORDER — TAMSULOSIN HYDROCHLORIDE 0.4 MG/1
0.4 CAPSULE ORAL DAILY
Qty: 30 CAPSULE | Refills: 6 | Status: SHIPPED | OUTPATIENT
Start: 2025-02-12

## 2025-02-12 RX ORDER — ROSUVASTATIN CALCIUM 10 MG/1
10 TABLET, COATED ORAL DAILY
Qty: 30 TABLET | Refills: 6 | Status: SHIPPED | OUTPATIENT
Start: 2025-02-12 | End: 2026-02-12

## 2025-02-12 RX ORDER — CLOPIDOGREL BISULFATE 75 MG/1
75 TABLET ORAL DAILY
Qty: 30 TABLET | Refills: 6 | Status: SHIPPED | OUTPATIENT
Start: 2025-02-12 | End: 2026-02-12

## 2025-02-12 NOTE — PROGRESS NOTES
[facility] TCC Provider Note (Follow-up) Consult Note      HPI:  78 y.o. male patient, PMHx: Diastolic HF, HTN, DVT, CAD. Presented to the Emergency Department for evaluation of CP which onset night PTA. Pt visited Dr. Corrales (Cardiology) for the first time on 4/1/24 and was initiated on Eliquis.                    Here for 3 month follow up     On lasix responds well     EF improved  no SOB, PND     No LE edema     On GDMT that BP allows  No LH     PHYSICAL:   Vitals:    02/12/25 0844   BP: (!) 160/72   Pulse: 60       JVD: no,    Heart rhythm: regular  Cardiac murmur: No    S3: no  S4: no  Lungs: clear  Liver span: 10 cm:   Hepatojugular reflux: no  Edema: no,       ASSESSMENT: chronic systolic HF    PLAN:      Patient Instructions:   Instruct the patient to notify this clinic if HH, a physician or an advanced care provider wants to change medication one of their HF medications   Activity and Diet restrictions:   Recommend 2-3 gram sodium restriction and 1500cc- 2000cc fluid restriction.  Encourage physical activity with graded exercise program.  Requested patient to weigh themselves daily, and to notify us if their weight increases by more than 3 lbs in 1 day or 5 lbs in 3 days.    Assigned dry weight on home scale: daily weight  Medication changes (include current dose and changed dose)  CHF education done  Continue lasix daily  GDMT  RTC 6 months  Keep BP log

## 2025-02-22 DIAGNOSIS — Z00.00 ENCOUNTER FOR MEDICARE ANNUAL WELLNESS EXAM: ICD-10-CM

## 2025-03-24 PROBLEM — I50.9 CHRONIC CONGESTIVE HEART FAILURE: Status: ACTIVE | Noted: 2025-03-24

## 2025-03-24 PROBLEM — I50.22 CHRONIC SYSTOLIC CONGESTIVE HEART FAILURE: Status: ACTIVE | Noted: 2025-03-24

## 2025-03-24 PROBLEM — R07.9 CHEST PAIN: Status: RESOLVED | Noted: 2024-04-02 | Resolved: 2025-03-24

## 2025-03-24 PROBLEM — I50.9 ACUTE ON CHRONIC CONGESTIVE HEART FAILURE: Status: RESOLVED | Noted: 2024-04-02 | Resolved: 2025-03-24

## 2025-03-26 ENCOUNTER — OFFICE VISIT (OUTPATIENT)
Dept: INTERNAL MEDICINE | Facility: CLINIC | Age: 80
End: 2025-03-26
Payer: MEDICARE

## 2025-03-26 VITALS
WEIGHT: 262.81 LBS | BODY MASS INDEX: 36.79 KG/M2 | SYSTOLIC BLOOD PRESSURE: 140 MMHG | HEART RATE: 58 BPM | RESPIRATION RATE: 20 BRPM | HEIGHT: 71 IN | DIASTOLIC BLOOD PRESSURE: 78 MMHG

## 2025-03-26 DIAGNOSIS — Z74.09 OTHER REDUCED MOBILITY: ICD-10-CM

## 2025-03-26 DIAGNOSIS — I82.5Z2 CHRONIC DEEP VEIN THROMBOSIS (DVT) OF DISTAL VEIN OF LEFT LOWER EXTREMITY: ICD-10-CM

## 2025-03-26 DIAGNOSIS — I51.89 GRADE II DIASTOLIC DYSFUNCTION: ICD-10-CM

## 2025-03-26 DIAGNOSIS — I87.2 VENOUS INSUFFICIENCY: ICD-10-CM

## 2025-03-26 DIAGNOSIS — I50.22 CHRONIC SYSTOLIC CONGESTIVE HEART FAILURE: ICD-10-CM

## 2025-03-26 DIAGNOSIS — I70.0 ATHEROSCLEROSIS OF AORTA: ICD-10-CM

## 2025-03-26 DIAGNOSIS — E66.01 CLASS 2 SEVERE OBESITY WITH SERIOUS COMORBIDITY AND BODY MASS INDEX (BMI) OF 36.0 TO 36.9 IN ADULT, UNSPECIFIED OBESITY TYPE: ICD-10-CM

## 2025-03-26 DIAGNOSIS — Z95.5 S/P CORONARY ARTERY STENT PLACEMENT: ICD-10-CM

## 2025-03-26 DIAGNOSIS — I48.92 ATRIAL FLUTTER, UNSPECIFIED TYPE: ICD-10-CM

## 2025-03-26 DIAGNOSIS — R26.9 ABNORMALITY OF GAIT AND MOBILITY: ICD-10-CM

## 2025-03-26 DIAGNOSIS — I10 ESSENTIAL HYPERTENSION: ICD-10-CM

## 2025-03-26 DIAGNOSIS — I44.7 LEFT BUNDLE BRANCH BLOCK: ICD-10-CM

## 2025-03-26 DIAGNOSIS — R79.89 ELEVATED TROPONIN: ICD-10-CM

## 2025-03-26 DIAGNOSIS — Z00.00 ENCOUNTER FOR MEDICARE ANNUAL WELLNESS EXAM: Primary | ICD-10-CM

## 2025-03-26 DIAGNOSIS — I87.2 VENOUS STASIS DERMATITIS OF BOTH LOWER EXTREMITIES: ICD-10-CM

## 2025-03-26 DIAGNOSIS — H91.90 HEARING LOSS, UNSPECIFIED HEARING LOSS TYPE, UNSPECIFIED LATERALITY: ICD-10-CM

## 2025-03-26 DIAGNOSIS — I25.118 CORONARY ARTERY DISEASE OF NATIVE ARTERY OF NATIVE HEART WITH STABLE ANGINA PECTORIS: ICD-10-CM

## 2025-03-26 DIAGNOSIS — I89.0 LYMPHEDEMA: ICD-10-CM

## 2025-03-26 DIAGNOSIS — E66.812 CLASS 2 SEVERE OBESITY WITH SERIOUS COMORBIDITY AND BODY MASS INDEX (BMI) OF 36.0 TO 36.9 IN ADULT, UNSPECIFIED OBESITY TYPE: ICD-10-CM

## 2025-03-26 PROBLEM — G57.11 MERALGIA PARAESTHETICA, RIGHT: Status: RESOLVED | Noted: 2020-07-15 | Resolved: 2025-03-26

## 2025-03-26 PROBLEM — M25.551 HIP PAIN, ACUTE, RIGHT: Status: RESOLVED | Noted: 2024-04-10 | Resolved: 2025-03-26

## 2025-03-26 PROBLEM — R06.01 ORTHOPNEA: Status: RESOLVED | Noted: 2024-04-02 | Resolved: 2025-03-26

## 2025-03-26 PROCEDURE — 99999 PR PBB SHADOW E&M-EST. PATIENT-LVL V: CPT | Mod: PBBFAC,,, | Performed by: NURSE PRACTITIONER

## 2025-03-26 PROCEDURE — 99215 OFFICE O/P EST HI 40 MIN: CPT | Mod: PBBFAC | Performed by: NURSE PRACTITIONER

## 2025-03-26 NOTE — PATIENT INSTRUCTIONS
Counseling and Referral of Other Preventative  (Italic type indicates deductible and co-insurance are waived)    Patient Name: Chucho Clark  Today's Date: 3/26/2025    Health Maintenance       Date Due Completion Date    Shingles Vaccine (1 of 2) Never done ---    RSV Vaccine (Age 60+ and Pregnant patients) (1 - 1-dose 75+ series) Never done ---    Pneumococcal Vaccines (Age 50+) (2 of 2 - PCV) 08/12/2021 8/12/2020    Influenza Vaccine (1) Never done ---    COVID-19 Vaccine (3 - 2024-25 season) 09/01/2024 2/18/2021    Lipid Panel 07/10/2029 7/10/2024    TETANUS VACCINE 12/18/2030 12/18/2020        Orders Placed This Encounter   Procedures    Ambulatory referral/consult to Pharmacy Assistance    Ambulatory referral/consult to Audiology       The following information is provided to all patients.  This information is to help you find resources for any of the problems found today that may be affecting your health:                  Living healthy guide: www.[facility].[place].gov      Understanding Diabetes: www.diabetes.org      Eating healthy: www.cdc.gov/healthyweight      CDC home safety checklist: www.cdc.gov/steadi/patient.html      Agency on Aging: www.goea.[place].gov      Alcoholics anonymous (AA): www.aa.org      Physical Activity: www.zahra.nih.gov/zw4gcnx      Tobacco use: www.quitwithusla.org

## 2025-03-28 ENCOUNTER — TELEPHONE (OUTPATIENT)
Dept: PHARMACY | Facility: CLINIC | Age: 80
End: 2025-03-28
Payer: MEDICARE

## 2025-03-28 NOTE — TELEPHONE ENCOUNTER
----- Message from Robert Araiza sent at 3/26/2025  9:46 AM CDT -----  Regarding: FW: Order for EUFEMIA CARROLL    ----- Message -----  From: Lucy Gross FNP  Sent: 3/26/2025   9:07 AM CDT  To: Pharmacy Patient Assistance Team  Subject: Order for EUFEMIA CARROLL

## 2025-03-28 NOTE — LETTER
March 28, 2025    Chucho Clark  [address]  [place] [address]           Dear Mr. Clark        My name is Trent Land .  I am reaching out on behalf of [facility]s Pharmacy Patient Assistance Team after receiving a referral from your Provider inquiring about assistance with your Eliquis. Unfortunately, The Pharmacy Patient Assistance Team is unable to submit your application at this time due to the following reasons       [facility]  Patient Assistance Program guidelines state, patient must demonstrate he is ineligible for Medicare's Low Income Subsidy(LIS)/ Extra Help Program. Apply by calling 1-472.256.7211 or online @ https://secure.ssa.gov/i1020/start.  Please provide a copy of  the determination letter to assigned advocate Trent Land  ASAP for application submission     [facility]  Patient Assistance Program guidelines state patient must demonstrate he has spent a minimum 3% of his household income on prescriptions for the current year.        Patient may be eligible for a Medicare Prescription Payment Plan. Please contact your insurance company for enrollment details.        Sincerely  Trent PAUL @[phone]  Pharmacy Patient Assistance  [address]  [place] [address]  Fax: [phone]  [email or web address]

## 2025-03-28 NOTE — TELEPHONE ENCOUNTER
[facility]  Patient Assistance Program guidelines state patient must demonstrate he is ineligible for Medicare's Low Income Subsidy(LIS)/ Extra Help Program. Patient can apply by calling [phone] or online @ https://secure.[facility].gov/i1020/start.  Please provide a copy of  the determination letter to assigned advocate Trent SINGH for application submission     [facility]  Patient Assistance Program guidelines state, patient must demonstrate he has spent a minimum 3% of his household income on prescriptions for the current year.        Patient may be eligible for a Medicare Prescription Payment Plan. Patient has been advised to contact his insurance company for enrollment details.           Sincerely   Trent Land   Pharmacy Patient Assistance Team

## 2025-04-15 ENCOUNTER — PATIENT MESSAGE (OUTPATIENT)
Dept: NEUROLOGY | Facility: CLINIC | Age: 80
End: 2025-04-15
Payer: MEDICARE

## 2025-04-15 RX ORDER — FUROSEMIDE 20 MG/1
20 TABLET ORAL
Qty: 30 TABLET | Refills: 0 | Status: SHIPPED | OUTPATIENT
Start: 2025-04-15

## 2025-05-06 ENCOUNTER — OFFICE VISIT (OUTPATIENT)
Dept: OTOLARYNGOLOGY | Facility: CLINIC | Age: 80
End: 2025-05-06
Payer: MEDICARE

## 2025-05-06 ENCOUNTER — CLINICAL SUPPORT (OUTPATIENT)
Dept: AUDIOLOGY | Facility: CLINIC | Age: 80
End: 2025-05-06
Payer: MEDICARE

## 2025-05-06 VITALS — BODY MASS INDEX: 37.44 KG/M2 | WEIGHT: 267.44 LBS | HEIGHT: 71 IN

## 2025-05-06 DIAGNOSIS — H72.92 TYMPANIC MEMBRANE PERFORATION, LEFT: ICD-10-CM

## 2025-05-06 DIAGNOSIS — H72.92 PERFORATION OF LEFT TYMPANIC MEMBRANE: ICD-10-CM

## 2025-05-06 DIAGNOSIS — H90.A32 MIXED CONDUCTIVE AND SENSORINEURAL HEARING LOSS OF LEFT EAR WITH RESTRICTED HEARING OF RIGHT EAR: Primary | ICD-10-CM

## 2025-05-06 DIAGNOSIS — H91.90 HEARING LOSS, UNSPECIFIED HEARING LOSS TYPE, UNSPECIFIED LATERALITY: ICD-10-CM

## 2025-05-06 DIAGNOSIS — Z00.00 ENCOUNTER FOR MEDICARE ANNUAL WELLNESS EXAM: ICD-10-CM

## 2025-05-06 PROCEDURE — 99212 OFFICE O/P EST SF 10 MIN: CPT | Mod: PBBFAC,27 | Performed by: AUDIOLOGIST-HEARING AID FITTER

## 2025-05-06 PROCEDURE — 99204 OFFICE O/P NEW MOD 45 MIN: CPT | Mod: S$PBB,,, | Performed by: STUDENT IN AN ORGANIZED HEALTH CARE EDUCATION/TRAINING PROGRAM

## 2025-05-06 PROCEDURE — 99212 OFFICE O/P EST SF 10 MIN: CPT | Mod: PBBFAC,25 | Performed by: STUDENT IN AN ORGANIZED HEALTH CARE EDUCATION/TRAINING PROGRAM

## 2025-05-06 PROCEDURE — 92557 COMPREHENSIVE HEARING TEST: CPT | Mod: PBBFAC | Performed by: AUDIOLOGIST-HEARING AID FITTER

## 2025-05-06 PROCEDURE — 92567 TYMPANOMETRY: CPT | Mod: PBBFAC | Performed by: AUDIOLOGIST-HEARING AID FITTER

## 2025-05-06 PROCEDURE — 99999 PR PBB SHADOW E&M-EST. PATIENT-LVL II: CPT | Mod: PBBFAC,,, | Performed by: AUDIOLOGIST-HEARING AID FITTER

## 2025-05-06 PROCEDURE — 99999 PR PBB SHADOW E&M-EST. PATIENT-LVL II: CPT | Mod: PBBFAC,,, | Performed by: STUDENT IN AN ORGANIZED HEALTH CARE EDUCATION/TRAINING PROGRAM

## 2025-05-06 NOTE — PROGRESS NOTES
Referring provider: HAIDER Hernandez    Chucho Clark was seen 05/06/2025 for an audiological evaluation.  Patient complains of hearing loss in the bilateral ear of gradual decline. He reports left poorer than right hearing for over ten years. No tinnitus. No vertigo. No aural pressure, fullness, otalgia or otorrhea. No history of otologic surgery. History of childhood ear infections. No history of excessive noise, worked as . No family history of hearing loss.    Otoscopy:  Right ear: clear ear canal with normal appearing tympanic membrane  Left ear: clear ear canal, perforation (dry) of tympanic membrane    Audiogram:  Results reveal a mild-to-severe sensorineural hearing loss 250-8000 Hz for the right ear, and a moderate-to-severe mixed hearing loss 250-8000 Hz for the left ear.   Speech Reception Thresholds were 30 dBHL for the right ear and 55 dBHL for the left ear.   Word recognition scores were good for the right ear and good for the left ear.   Tympanograms were Type A for the right ear and Type B large volume, consistent with tympanic membrane perforation for the left ear.    Patient was counseled on the above findings.    Recommendations:  ENT  Annual audiogram or per ENT to monitor hearing loss.  Hearing aids, post medical clearance, Information packet was provided.

## 2025-05-06 NOTE — PROGRESS NOTES
Chief complaint:   Chief Complaint   Patient presents with    Ear Problem     Pt is coming in today for an abnormal audio, left tm perf. Pt denies and drainage, pain, or balance issues. He didn't know he had a perf.          Referring Provider:  Robert Avila Au.d, [facility]  [address] [place] [address]    History of Present Illness:     Mr. Clark is a 79 y.o. male presenting for evaluation of bilateral hearing loss, AS>AD, slowly progressive. Onset of this chief complaint was about yeas ago.  Additional symptoms that also have been associated are tinnitus. The patient denies pain, drainage.    He had lots of issues with the ears as a kid. No significant acoustic trauma.    The patient denies significant hearing loss risk factors, ototoxic medication exposure, chronic vestibular suppressant use, head/ facial/ luis trauma, and otologic surgery.        History        Past Medical History:   Past Medical History:   Diagnosis Date    CHF (congestive heart failure)     DVT (deep venous thrombosis)     Hypertension     Renal disorder     .          Past Surgical History:  Past Surgical History:   Procedure Laterality Date    ABDOMINAL AORTOGRAPHY  4/4/2024    Procedure: ANGIOGRAM, ABDOMINAL AORTA;  Surgeon: Suzanna Hernandez MD;  Location: [facility] CATH LAB;  Service: Cardiology;;    ANGIOGRAM, CORONARY, WITH LEFT HEART CATHETERIZATION N/A 4/4/2024    Procedure: Angiogram, Coronary, with Left Heart Cath;  Surgeon: Suzanna Hernandez MD;  Location: [facility] CATH LAB;  Service: Cardiology;  Laterality: N/A;    ANGIOGRAM, CORONARY, WITH LEFT HEART CATHETERIZATION N/A 4/5/2024    Procedure: Angiogram, Coronary, with Left Heart Cath;  Surgeon: Suzanna Hernandez MD;  Location: [facility] CATH LAB;  Service: Cardiology;  Laterality: N/A;    ARTERIOGRAPHY OF SUBCLAVIAN ARTERY  4/4/2024    Procedure: ARTERIOGRAM, SUBCLAVIAN;  Surgeon: Suzanna Hernandez MD;  Location: [facility] CATH LAB;  Service: Cardiology;;     CARDIOVERSION N/A 4/7/2024    Procedure: Cardioversion;  Surgeon: Suzanna Hernandez MD;  Location: [facility] CATH LAB;  Service: Cardiology;  Laterality: N/A;    CORONARY ANGIOGRAPHY N/A 4/5/2024    Procedure: ANGIOGRAM, CORONARY ARTERY;  Surgeon: Suzanna Hernandez MD;  Location: [facility] CATH LAB;  Service: Cardiology;  Laterality: N/A;    LITHOTRIPSY, CORONARY TRANSLUMINAL, PERCUTANEOUS  4/5/2024    Procedure: LITHOTRIPSY, CORONARY TRANSLUMINAL, PERCUTANEOUS;  Surgeon: Suzanna Hernandez MD;  Location: [facility] CATH LAB;  Service: Cardiology;;    PERCUTANEOUS CORONARY INTERVENTION, ARTERY N/A 4/5/2024    Procedure: Percutaneous coronary intervention;  Surgeon: Suzanna Hernandez MD;  Location: [facility] CATH LAB;  Service: Cardiology;  Laterality: N/A;    PERCUTANEOUS CORONARY INTERVENTION, ARTERY N/A 4/8/2024    Procedure: Percutaneous coronary intervention- RCA/ ;  Surgeon: Shan Mayers MD;  Location: [facility] CATH LAB;  Service: Cardiology;  Laterality: N/A;    PLACEMENT, IABP N/A 4/5/2024    Procedure: Placement, IABP;  Surgeon: Suzanna Hernandez MD;  Location: [facility] CATH LAB;  Service: Cardiology;  Laterality: N/A;    REPAIR, CHRONIC TOTAL OCCLUSION, CORONARY  4/8/2024    Procedure: Repair, Chronic Total Occlusion, Coronary;  Surgeon: Shan Mayers MD;  Location: [facility] CATH LAB;  Service: Cardiology;;    RIGHT HEART CATHETERIZATION Right 4/4/2024    Procedure: INSERTION, CATHETER, RIGHT HEART;  Surgeon: Suzanna Hernandez MD;  Location: [facility] CATH LAB;  Service: Cardiology;  Laterality: Right;    STENT, DRUG ELUTING, MULTI VESSEL, CORONARY  4/5/2024    Procedure: Stent, Drug Eluting, Multi Vessel, Coronary;  Surgeon: Suzanna Hernandez MD;  Location: [facility] CATH LAB;  Service: Cardiology;;    STENT, DRUG ELUTING, MULTI VESSEL, CORONARY  4/8/2024    Procedure: Stent, Drug Eluting, Multi Vessel, Coronary;  Surgeon: Shan Mayers MD;  Location: [facility] CATH LAB;  Service: Cardiology;;    THROMBECTOMY, CORONARY  4/8/2024     Procedure: Thrombectomy, Coronary;  Surgeon: Shan Mayers MD;  Location: [facility] CATH LAB;  Service: Cardiology;;    TRANSESOPHAGEAL ECHOCARDIOGRAM WITH POSSIBLE CARDIOVERSION (RODERICK W/ POSS CARDIOVERSION) N/A 4/4/2024    Procedure: Transesophageal echo (RODERICK) intra-procedure log documentation;  Surgeon: Suzanna Hernandez MD;  Location: [facility] CATH LAB;  Service: Cardiology;  Laterality: N/A;  After [facility]   .         Medications: Medication list was reviewed. He  has a current medication list which includes the following prescription(s): amiodarone, apixaban, bisacodyl, clopidogrel, furosemide, isosorbide mononitrate, metoprolol tartrate, polyethylene glycol, rosuvastatin, senna-docusate, tamsulosin, aspirin, and nitroglycerin.         Allergies:   Review of patient's allergies indicates:   Allergen Reactions    Vancomycin Hives     Patient given vancomycin on 7/7/2020 developed hives.  Vancomycin added to allergy list.            Family history: family history includes Cancer in his mother; Diabetes in his mother.         Social History          Alcohol use:  reports current alcohol use.            Tobacco:  reports that he has quit smoking. He has quit using smokeless tobacco.         Please see the patient's intake form for full details of past medical history, past surgical history, family history, social history and review of systems. ?This information was reviewed by me and noted.      Physical Examination     General: Well developed, well nourished, well hydrated. Verbal with a strong voice and not dysphonic.     Head/Face: Normocephalic, atraumatic. No scars or lesions. Facial musculature equal.     Eyes: No scleral icterus or conjunctival hemorrhage. EOMI. PERRLA.     Ears:     Right ear: No gross deformity. EAC is clear of debris and erythema. The TM is intact with a pneumatized middle ear. No signs of retraction, fluid or infection.      Left ear: No gross deformity. EAC is clear of debris and erythema.  20%  posterior perforation, dry    Hearing: grossly intact    Nose: No gross deformity or lesions. No purulent discharge. No significant NSD.      Mouth/Oropharynx: Lips without any lesions. No mucosal lesions within the oropharynx. No tonsillar exudate or lesions. Pharyngeal walls symmetrical. Uvula midline. Tongue midline without lesions.     Neck: Trachea midline. No masses. No thyromegaly or nodules palpated.     Lymphatic: No lymphadenopathy in the neck.     Extremities: No cyanosis. Warm and well-perfused.     Skin: No scars or lesions on face or neck.      Neurologic: Moving all extremities without gross abnormality.CN II-XII grossly intact. House-Brackmann 1/6. No signs of nystagmus.      Psych: Alert and oriented to person, place, and time with an appropriate mood and affect.     Data review:    Review of records:      I reviewed records from the referring provider's office visits.  These describe the history, workup, and/or treatment of this problem thus far.    Audiogram     Audiogram was independently reviewed     Moderate to severe mixed hearing loss AS  Mild to severe Sensorineural Hearing Loss AD  Large volume tymp AS, type A AD  Good word rec     Assessment/Plan:      1. Mixed conductive and sensorineural hearing loss of left ear with restricted hearing of right ear       -HEARING LOSS-  I spent a considerable amount of time educating the patient on hearing and hearing loss.  We discussed the basic characteristics of conductive hearing loss versus sensorineural hearing loss and the significant differences in treatment options between the two categories.      We discussed that in cases of conductive hearing loss, this suggests a mechanical disorder that sometimes can be improved with medications &/or surgery.  It may occur secondary to external ear pathology (atresia, otitis externa, etc), tympanic membrane disorders (large perforations, immobility due to scarring or eustachian tube dysfunction), and  middle ear disorders (effusions, ossicular disorders).    Sensorineural hearing loss is the expected hearing loss pattern with aging, but some disorders such as Meniere's may accelerate this process.  Additionally, amplification with hearing aids is generally the best option for hearing rehabilitation, except where the hearing loss is profound.  We discussed that this generally does not represent a dangerous condition, but in cases where there is a large discrepancy between the two ears in terms of nerve function, more investigation is often necessary due to the possiblity of vestibular schwannomas or meningiomas at the cerebellopontine angle.  The definitive test for this is an MRI with gadolinium.  However, these masses are usually very slow growing (1-2mm/year), so patients may elect to repeat an audiogram in about 6 months or obtain an ABR so long as they understand that this may result in a delay in diagnosis (although very unlikely that this would have a significant clinical impact on their outcome due to the slow growing nature of these masses) with the understanding that if ABR is abnormal or asymmetry increases, an MRI would then be required.    Chucho  presents with what appears to be mixed hearing loss AS with associated perforation and Sensorineural Hearing Loss AD. He has no pain or drainage and is not interested in surgical repair. Based on this, my recommendation is hearing aids AU for which he is medically cleared    Return to clinic if he develops pain, drainage      Erick Salinas MD  [facility]   [facility] - [address]  [place] [address]  P: [phone]  F: [phone]

## 2025-05-19 RX ORDER — FUROSEMIDE 20 MG/1
20 TABLET ORAL
Qty: 30 TABLET | Refills: 0 | Status: SHIPPED | OUTPATIENT
Start: 2025-05-19

## 2025-06-16 RX ORDER — FUROSEMIDE 20 MG/1
20 TABLET ORAL
Qty: 30 TABLET | Refills: 0 | Status: SHIPPED | OUTPATIENT
Start: 2025-06-16

## 2025-07-15 RX ORDER — FUROSEMIDE 20 MG/1
20 TABLET ORAL
Qty: 30 TABLET | Refills: 3 | Status: SHIPPED | OUTPATIENT
Start: 2025-07-15

## 2025-07-29 ENCOUNTER — PATIENT MESSAGE (OUTPATIENT)
Dept: ADMINISTRATIVE | Facility: HOSPITAL | Age: 80
End: 2025-07-29
Payer: MEDICARE

## 2025-08-12 ENCOUNTER — OFFICE VISIT (OUTPATIENT)
Dept: CARDIOLOGY | Facility: CLINIC | Age: 80
End: 2025-08-12
Payer: MEDICARE

## 2025-08-12 VITALS
BODY MASS INDEX: 37.5 KG/M2 | DIASTOLIC BLOOD PRESSURE: 82 MMHG | WEIGHT: 267.88 LBS | OXYGEN SATURATION: 95 % | HEIGHT: 71 IN | SYSTOLIC BLOOD PRESSURE: 140 MMHG | HEART RATE: 60 BPM

## 2025-08-12 DIAGNOSIS — I82.5Z2 CHRONIC DEEP VEIN THROMBOSIS (DVT) OF DISTAL VEIN OF LEFT LOWER EXTREMITY: Primary | ICD-10-CM

## 2025-08-12 DIAGNOSIS — I10 ESSENTIAL HYPERTENSION: ICD-10-CM

## 2025-08-12 PROCEDURE — 99999 PR PBB SHADOW E&M-EST. PATIENT-LVL III: CPT | Mod: PBBFAC,,, | Performed by: PHYSICIAN ASSISTANT

## 2025-08-12 PROCEDURE — 99214 OFFICE O/P EST MOD 30 MIN: CPT | Mod: S$PBB,,, | Performed by: PHYSICIAN ASSISTANT

## 2025-08-12 PROCEDURE — 99213 OFFICE O/P EST LOW 20 MIN: CPT | Mod: PBBFAC | Performed by: PHYSICIAN ASSISTANT

## 2025-08-29 ENCOUNTER — PATIENT MESSAGE (OUTPATIENT)
Dept: ADMINISTRATIVE | Facility: HOSPITAL | Age: 80
End: 2025-08-29
Payer: MEDICARE

## (undated) DEVICE — WIRE CHOICE PT X SUPP 014X300

## (undated) DEVICE — GUIDE WIRE J-TIP 260CM .025

## (undated) DEVICE — SHEATH INTRODUCER 6FR 11CM

## (undated) DEVICE — KIT WATCHDOG HEMSTAS VALVE 8FR

## (undated) DEVICE — ANGIOTOUCH KIT

## (undated) DEVICE — CATH JL5 4FR INFINITY

## (undated) DEVICE — CATH IMPULSE PIGTAIL 6F 110CM

## (undated) DEVICE — PAD DEFIB CADENCE ADULT R2

## (undated) DEVICE — CATH NC EMERGE MR 3X12MM

## (undated) DEVICE — CATH NC EMERGE MR 3.25X12MM

## (undated) DEVICE — Device

## (undated) DEVICE — KIT MANIFOLD LOW PRESS TUBING

## (undated) DEVICE — SUT SILK 2-0 PS 18IN BLACK

## (undated) DEVICE — GUIDEWIRE EMERALD .035IN 260CM

## (undated) DEVICE — GUIDEWIRE RUNTHROUGH NS 180CM

## (undated) DEVICE — PACK ANGIOPLASTY ACCESS PLUS

## (undated) DEVICE — CATH INFINITI MULTIPAK JR4 5FR

## (undated) DEVICE — CATH IMPULSE 5F 100CM FR4

## (undated) DEVICE — CATH JL3.5 5FR

## (undated) DEVICE — GUIDEWIRE WHOLEY HI TORQ 175CM

## (undated) DEVICE — CATH CV QD LUMN 6FRX110CM

## (undated) DEVICE — KIT SYR REUSABLE

## (undated) DEVICE — BAND TR WITH INFLATOR

## (undated) DEVICE — WIRE GUIDE TEFLON 3CM .035 145

## (undated) DEVICE — GUIDE LAUNCHER 6FR EBU 3.75

## (undated) DEVICE — CATH JR4 5FR

## (undated) DEVICE — CATH JL5 5FR

## (undated) DEVICE — KIT MICRO INTRODUCER 4F .018IN

## (undated) DEVICE — CATH SAPPH II PRO SC 2X20MM

## (undated) DEVICE — GUIDE LAUNCHER 6FR JR 4.0

## (undated) DEVICE — CATH PIG145 INFINITI 5X110CM

## (undated) DEVICE — CATH JL4 5FR

## (undated) DEVICE — INFLATOR ENCORE 26 BLLN INFL

## (undated) DEVICE — CATH SHOCKWAVE C2+ IVL 3.0X12M

## (undated) DEVICE — PACK HEART CATH BR

## (undated) DEVICE — WIRE BMW 014X190

## (undated) DEVICE — NDL PERCUTANEOUS 21G 7CM VASC

## (undated) DEVICE — OMNIPAQUE 300MG 150ML VIAL

## (undated) DEVICE — DRAPE ANGIO BRACH 38X44IN

## (undated) DEVICE — BLLN CATH SENSATION PLUS 8FR

## (undated) DEVICE — CATH EMERGE MR 15 X 2.50

## (undated) DEVICE — PAD RADIOLUCENT STAT ADULT

## (undated) DEVICE — KIT GLIDESHEATH SLEND 6FR 10CM

## (undated) DEVICE — GUIDEWIRE FIELDER XT.014X300CM

## (undated) DEVICE — CATH IMPULSE 5FR PIGTAIL 125CM